# Patient Record
Sex: MALE | Race: WHITE | Employment: UNEMPLOYED | URBAN - METROPOLITAN AREA
[De-identification: names, ages, dates, MRNs, and addresses within clinical notes are randomized per-mention and may not be internally consistent; named-entity substitution may affect disease eponyms.]

---

## 2018-03-19 ENCOUNTER — APPOINTMENT (OUTPATIENT)
Dept: GENERAL RADIOLOGY | Age: 58
End: 2018-03-19
Attending: PHYSICIAN ASSISTANT
Payer: COMMERCIAL

## 2018-03-19 ENCOUNTER — HOSPITAL ENCOUNTER (EMERGENCY)
Age: 58
Discharge: HOME | End: 2018-03-19
Attending: EMERGENCY MEDICINE
Payer: COMMERCIAL

## 2018-03-19 VITALS
TEMPERATURE: 98.6 F | WEIGHT: 230 LBS | DIASTOLIC BLOOD PRESSURE: 97 MMHG | HEIGHT: 71 IN | OXYGEN SATURATION: 97 % | BODY MASS INDEX: 32.2 KG/M2 | RESPIRATION RATE: 16 BRPM | SYSTOLIC BLOOD PRESSURE: 129 MMHG | HEART RATE: 100 BPM

## 2018-03-19 DIAGNOSIS — M19.041 ARTHRITIS OF RIGHT HAND: Primary | ICD-10-CM

## 2018-03-19 DIAGNOSIS — L03.113 CELLULITIS OF RIGHT UPPER EXTREMITY: ICD-10-CM

## 2018-03-19 LAB
ANION GAP SERPL CALC-SCNC: 9 MMOL/L (ref 3–18)
BASOPHILS # BLD: 0 K/UL (ref 0–0.06)
BASOPHILS NFR BLD: 0 % (ref 0–2)
BUN SERPL-MCNC: 16 MG/DL (ref 7–18)
BUN/CREAT SERPL: 22 (ref 12–20)
CALCIUM SERPL-MCNC: 9.5 MG/DL (ref 8.5–10.1)
CHLORIDE SERPL-SCNC: 102 MMOL/L (ref 100–108)
CO2 SERPL-SCNC: 27 MMOL/L (ref 21–32)
CREAT SERPL-MCNC: 0.72 MG/DL (ref 0.6–1.3)
D DIMER PPP FEU-MCNC: <0.27 UG/ML(FEU)
DIFFERENTIAL METHOD BLD: ABNORMAL
EOSINOPHIL # BLD: 0.1 K/UL (ref 0–0.4)
EOSINOPHIL NFR BLD: 1 % (ref 0–5)
ERYTHROCYTE [DISTWIDTH] IN BLOOD BY AUTOMATED COUNT: 12.6 % (ref 11.6–14.5)
ERYTHROCYTE [SEDIMENTATION RATE] IN BLOOD: 7 MM/HR (ref 0–20)
GLUCOSE SERPL-MCNC: 110 MG/DL (ref 74–99)
HCT VFR BLD AUTO: 46.1 % (ref 36–48)
HGB BLD-MCNC: 15.2 G/DL (ref 13–16)
LYMPHOCYTES # BLD: 1.8 K/UL (ref 0.9–3.6)
LYMPHOCYTES NFR BLD: 19 % (ref 21–52)
MCH RBC QN AUTO: 29.9 PG (ref 24–34)
MCHC RBC AUTO-ENTMCNC: 33 G/DL (ref 31–37)
MCV RBC AUTO: 90.7 FL (ref 74–97)
MONOCYTES # BLD: 0.9 K/UL (ref 0.05–1.2)
MONOCYTES NFR BLD: 9 % (ref 3–10)
NEUTS SEG # BLD: 6.9 K/UL (ref 1.8–8)
NEUTS SEG NFR BLD: 71 % (ref 40–73)
PLATELET # BLD AUTO: 311 K/UL (ref 135–420)
PMV BLD AUTO: 9.5 FL (ref 9.2–11.8)
POTASSIUM SERPL-SCNC: 4.6 MMOL/L (ref 3.5–5.5)
RBC # BLD AUTO: 5.08 M/UL (ref 4.7–5.5)
SODIUM SERPL-SCNC: 138 MMOL/L (ref 136–145)
URATE SERPL-MCNC: 6.8 MG/DL (ref 2.6–7.2)
WBC # BLD AUTO: 9.8 K/UL (ref 4.6–13.2)

## 2018-03-19 PROCEDURE — 99282 EMERGENCY DEPT VISIT SF MDM: CPT

## 2018-03-19 PROCEDURE — 80048 BASIC METABOLIC PNL TOTAL CA: CPT

## 2018-03-19 PROCEDURE — 84550 ASSAY OF BLOOD/URIC ACID: CPT

## 2018-03-19 PROCEDURE — 96375 TX/PRO/DX INJ NEW DRUG ADDON: CPT

## 2018-03-19 PROCEDURE — 74011250636 HC RX REV CODE- 250/636: Performed by: PHYSICIAN ASSISTANT

## 2018-03-19 PROCEDURE — 73120 X-RAY EXAM OF HAND: CPT

## 2018-03-19 PROCEDURE — 74011000250 HC RX REV CODE- 250: Performed by: PHYSICIAN ASSISTANT

## 2018-03-19 PROCEDURE — 85379 FIBRIN DEGRADATION QUANT: CPT

## 2018-03-19 PROCEDURE — 85025 COMPLETE CBC W/AUTO DIFF WBC: CPT

## 2018-03-19 PROCEDURE — 85652 RBC SED RATE AUTOMATED: CPT

## 2018-03-19 PROCEDURE — 73110 X-RAY EXAM OF WRIST: CPT

## 2018-03-19 PROCEDURE — 96374 THER/PROPH/DIAG INJ IV PUSH: CPT

## 2018-03-19 RX ORDER — NAPROXEN 375 MG/1
375 TABLET ORAL 2 TIMES DAILY WITH MEALS
Qty: 20 TAB | Refills: 0 | Status: SHIPPED | OUTPATIENT
Start: 2018-03-19 | End: 2021-05-13

## 2018-03-19 RX ORDER — KETOROLAC TROMETHAMINE 30 MG/ML
30 INJECTION, SOLUTION INTRAMUSCULAR; INTRAVENOUS
Status: COMPLETED | OUTPATIENT
Start: 2018-03-19 | End: 2018-03-19

## 2018-03-19 RX ORDER — TRAMADOL HYDROCHLORIDE 50 MG/1
50 TABLET ORAL
Qty: 12 TAB | Refills: 0 | Status: SHIPPED | OUTPATIENT
Start: 2018-03-19 | End: 2021-05-13

## 2018-03-19 RX ORDER — SULFAMETHOXAZOLE AND TRIMETHOPRIM 800; 160 MG/1; MG/1
1 TABLET ORAL 2 TIMES DAILY
Qty: 14 TAB | Refills: 0 | Status: SHIPPED | OUTPATIENT
Start: 2018-03-19 | End: 2018-03-26

## 2018-03-19 RX ORDER — CEPHALEXIN 500 MG/1
500 CAPSULE ORAL 4 TIMES DAILY
Qty: 28 CAP | Refills: 0 | Status: SHIPPED | OUTPATIENT
Start: 2018-03-19 | End: 2018-03-26

## 2018-03-19 RX ADMIN — Medication 1 G: at 18:10

## 2018-03-19 RX ADMIN — KETOROLAC TROMETHAMINE 30 MG: 30 INJECTION, SOLUTION INTRAMUSCULAR; INTRAVENOUS at 18:10

## 2018-03-19 NOTE — ED TRIAGE NOTES
States he noted right hand swelling on Friday morning, denies injury. Taking medication without relief.

## 2018-03-19 NOTE — LETTER
[address] [place] [address] Dr [facility] EMERG[facility] DEPT 
[address] [facility] [address] [phone] Work/School Note Date: 3/19/2018 To Whom It May concern: 
 
Rafael Rader was seen and treated today in the emergency room by the following provider(s): 
Attending Provider: Caitlin June MD 
Physician Assistant: Sharonda Galo PA-C. Rafael Rader may return to work on 03/22/2018. Sincerely, Sharonda Galo PA-C

## 2018-03-19 NOTE — ED NOTES
I have reviewed discharge instructions with the patient. The patient verbalized understanding. Current Discharge Medication List      START taking these medications    Details   naproxen (NAPROSYN) 375 mg tablet Take 1 Tab by mouth two (2) times daily (with meals). Qty: 20 Tab, Refills: 0      traMADol (ULTRAM) 50 mg tablet Take 1 Tab by mouth every six (6) hours as needed for Pain. Max Daily Amount: 200 mg. Qty: 12 Tab, Refills: 0    Associated Diagnoses: Arthritis of right hand      cephALEXin (KEFLEX) 500 mg capsule Take 1 Cap by mouth four (4) times daily for 7 days. Qty: 28 Cap, Refills: 0      trimethoprim-sulfamethoxazole (BACTRIM DS) 160-800 mg per tablet Take 1 Tab by mouth two (2) times a day for 7 days.   Qty: 14 Tab, Refills: 0         Patient armband removed and shredded

## 2018-03-19 NOTE — ED PROVIDER NOTES
Patient is a 65 y.o. male presenting with wrist pain. The history is provided by the patient. Wrist Pain    This is a new problem. Episode onset: 3 days ago. The problem occurs constantly. The problem has been gradually worsening. The pain is present in the right wrist and right hand (Radiating into the hand). The quality of the pain is described as aching. The pain is at a severity of 3/10. Pertinent negatives include no numbness, full range of motion, no stiffness, no tingling, no itching, no back pain and no neck pain. The symptoms are aggravated by palpation and movement. He has tried OTC pain medications and rest for the symptoms. The treatment provided mild relief. There has been no history of extremity trauma (But began after work). Family history is significant for rheumatoid arthritis. No past medical history on file. Past Surgical History:   Procedure Laterality Date    HX ORTHOPAEDIC           No family history on file. Social History     Social History    Marital status:      Spouse name: N/A    Number of children: N/A    Years of education: N/A     Occupational History    Not on file. Social History Main Topics    Smoking status: Never Smoker    Smokeless tobacco: Never Used    Alcohol use Not on file    Drug use: Not on file    Sexual activity: Not on file     Other Topics Concern    Not on file     Social History Narrative    No narrative on file         ALLERGIES: Review of patient's allergies indicates not on file. Review of Systems   Constitutional: Negative for chills and fever. HENT: Negative for ear pain, rhinorrhea and sore throat. Eyes: Negative for pain and redness. Respiratory: Negative for cough and shortness of breath. Cardiovascular: Negative for chest pain. Gastrointestinal: Negative for abdominal pain, constipation, diarrhea, nausea and vomiting. Genitourinary: Negative for dysuria.    Musculoskeletal: Positive for arthralgias and joint swelling. Negative for back pain, gait problem, neck pain, neck stiffness and stiffness. Skin: Negative. Negative for itching. Neurological: Negative for dizziness, tingling, tremors, seizures, syncope, facial asymmetry, speech difficulty, weakness, light-headedness, numbness and headaches. Psychiatric/Behavioral: Negative. All other systems reviewed and are negative. Vitals:    03/19/18 1546   BP: (!) 129/97   Pulse: 100   Resp: 16   Temp: 98.6 °F (37 °C)   SpO2: 97%   Weight: 104.3 kg (230 lb)   Height: 5' 11\" (1.803 m)            Physical Exam   Constitutional: He is oriented to person, place, and time. He appears well-developed and well-nourished. HENT:   Head: Normocephalic and atraumatic. Right Ear: Tympanic membrane, external ear and ear canal normal.   Left Ear: Tympanic membrane, external ear and ear canal normal.   Nose: Nose normal.   Mouth/Throat: Oropharynx is clear and moist and mucous membranes are normal.   Eyes: Conjunctivae and EOM are normal. Pupils are equal, round, and reactive to light. Neck: Normal range of motion. Cardiovascular: Normal rate, regular rhythm and normal heart sounds. Exam reveals no gallop and no friction rub. No murmur heard. Pulmonary/Chest: Effort normal and breath sounds normal. No respiratory distress. He has no wheezes. He has no rales. Abdominal: Soft. Bowel sounds are normal. There is no tenderness. Musculoskeletal:        Right shoulder: Normal.        Right elbow: Normal.       Right wrist: He exhibits decreased range of motion, tenderness and swelling. He exhibits no bony tenderness, no effusion, no crepitus, no deformity and no laceration. Right upper arm: Normal.        Right forearm: Normal.        Arms:       Right hand: He exhibits decreased range of motion, tenderness and swelling. He exhibits no bony tenderness, normal two-point discrimination, normal capillary refill, no deformity and no laceration.  Normal sensation noted. Decreased sensation is not present in the ulnar distribution, is not present in the medial distribution and is not present in the radial distribution. Decreased strength noted. He exhibits wrist extension trouble. He exhibits no finger abduction and no thumb/finger opposition. Right hand and wrist with swelling, erythema, TTP, stiffness. Middle phalanx with arthritic bony deformity. Neurological: He is alert and oriented to person, place, and time. Skin: Skin is warm and dry. Psychiatric: He has a normal mood and affect. His behavior is normal. Judgment and thought content normal.   Nursing note and vitals reviewed. MDM  Number of Diagnoses or Management Options  Arthritis of right hand: new and requires workup  Cellulitis of right upper extremity: new and requires workup  Diagnosis management comments: DIFFERENTIAL DIAGNOSES:  Fracture, dislocation, sprain, strain, tendonitis, DVT, Infection/ septic joint, DJD, RA, hemarthrosis, gout, pseudogout, inflammatory effusion. NO acute fx or dislocation, severe arthritis noted to right hand, radiology read pending. No elev WBC, sed rate, uric acid or d-dimer. D/w ED attending Dr. Isidro Urbina, who also examined pt, agrees c/w RA picture, but will cover for cellulitis with antibx. Have pt f/u with PCP and ortho. IMPRESSION AND MEDICAL DECISION MAKING:  Based upon the patient's presentation with noted HPI and PE, along with the work up done in the emergency department, I believe that the patient is having noted cellulitis. Will treat with antibiotics which include coverage for possible MRSA. SPECIFIC PATIENT INSTRUCTIONS FROM THE PHYSICIAN WHO TREATED YOU IN THE ER TODAY:  1. Return if any concerns or worsening of condition(s)  2. Keflex and Bactrim DS as prescribed until finished. 3. Take the naproxen as prescribed. Take the tramadol for pain not controlled by naprosyn.   4. Follow up with your primary doctor in 2 days or reevaluation in the ER in 48 hours. Follow up with the provided orthopedic referral.     Pt results have been reviewed with the patient and any family present. They have been counseled regarding diagnosis, treatment, and plan. They verbally convey understanding and agreement of the signs, symptoms, diagnosis, treatment and prognosis and additionally agrees to follow up as discussed. They also agree with the care-plan and convey that all of their questions have been answered. I have also provided discharge instructions for them that include: educational information regarding their diagnosis and treatment, and list of reasons why they would want to return to the ED prior to their follow-up appointment, should their condition change. Bret Lawson PA-C  5:55 PM        Amount and/or Complexity of Data Reviewed  Clinical lab tests: ordered and reviewed  Tests in the radiology section of CPT®: ordered and reviewed  Review and summarize past medical records: yes  Discuss the patient with other providers: yes  Independent visualization of images, tracings, or specimens: yes    Risk of Complications, Morbidity, and/or Mortality  Presenting problems: moderate  Diagnostic procedures: moderate  Management options: moderate    Patient Progress  Patient progress: stable        ED Course       Procedures      Labs Reviewed   CBC WITH AUTOMATED DIFF - Abnormal; Notable for the following:        Result Value    LYMPHOCYTES 19 (*)     All other components within normal limits   METABOLIC PANEL, BASIC - Abnormal; Notable for the following:     Glucose 110 (*)     BUN/Creatinine ratio 22 (*)     All other components within normal limits   D DIMER   URIC ACID   SED RATE (ESR)     Diagnosis:   1. Arthritis of right hand    2. Cellulitis of right upper extremity          Disposition: Discharge to home.      Follow-up Information     Follow up With Details Comments [facility]  As needed, If symptoms worsen [id number] [facility] [id number]  [address] [facility],[address] Ailin Canchola MD Go in 2 days For wound re-check [address]       [address] [facility] in 2 days  [address] [facility]  [phone]          Patient's Medications   Start Taking    CEPHALEXIN (KEFLEX) 500 MG CAPSULE    Take 1 Cap by mouth four (4) times daily for 7 days. NAPROXEN (NAPROSYN) 375 MG TABLET    Take 1 Tab by mouth two (2) times daily (with meals). TRAMADOL (ULTRAM) 50 MG TABLET    Take 1 Tab by mouth every six (6) hours as needed for Pain. Max Daily Amount: 200 mg. TRIMETHOPRIM-SULFAMETHOXAZOLE (BACTRIM DS) 160-800 MG PER TABLET    Take 1 Tab by mouth two (2) times a day for 7 days.    Continue Taking    No medications on file   These Medications have changed    No medications on file   Stop Taking    No medications on file

## 2018-03-22 ENCOUNTER — OFFICE VISIT (OUTPATIENT)
Dept: ORTHOPEDIC SURGERY | Age: 58
End: 2018-03-22

## 2018-03-22 VITALS
WEIGHT: 271.4 LBS | TEMPERATURE: 97 F | OXYGEN SATURATION: 98 % | BODY MASS INDEX: 37.99 KG/M2 | SYSTOLIC BLOOD PRESSURE: 169 MMHG | DIASTOLIC BLOOD PRESSURE: 101 MMHG | HEIGHT: 71 IN | HEART RATE: 88 BPM

## 2018-03-22 DIAGNOSIS — L03.113 CELLULITIS OF RIGHT HAND: Primary | ICD-10-CM

## 2018-03-22 NOTE — LETTER
NOTIFICATION RETURN TO WORK / SCHOOL 
 
3/22/2018 9:50 AM 
 
Mr. Augustus Garvey [address] [place] [address] To Whom It May Concern: 
 
Augustus Garvey is currently under the care of [address] [place] [address] Donny Tinoco. Patient will remain out of work until I reassess his condition at the next office visit 3/26/18. If there are questions or concerns please have the patient contact our office. Sincerely, Rhiannon Griffiths MD

## 2018-03-22 NOTE — PROGRESS NOTES
Felipe Gavin  1960   Chief Complaint   Patient presents with    Hand Pain     right        HISTORY OF PRESENT ILLNESS  Felipe Gavin is a 65 y.o. male who presents today for evaluation of right hand pain. he rates his pain 8/10 today. Pain has been present since 3/16/18. He was seen in the ED 3/19/18. When he woke up in the morning, he had significant swelling, redness and warmth in the hand. Patient has a small area on the right middle finger that was very itchy. He scratched it a lot and then a blister formed. Now he has pain that radiates up the elbow and eventually into the shoulder. Describes nerve pain in the hand and up the arm. Has taken bactrim and keflex for the problem. Reports having made improvements on the antibiotics. Has tried following treatments: Injections:NO; Brace:NO; Therapy:NO; Cane/Crutch:NO       No Known Allergies     History reviewed. No pertinent past medical history. Social History     Social History    Marital status: UNKNOWN     Spouse name: N/A    Number of children: N/A    Years of education: N/A     Occupational History    Not on file. Social History Main Topics    Smoking status: Never Smoker    Smokeless tobacco: Never Used    Alcohol use 1.2 oz/week     2 Cans of beer per week    Drug use: No    Sexual activity: Not on file     Other Topics Concern    Not on file     Social History Narrative      Past Surgical History:   Procedure Laterality Date    HX ORTHOPAEDIC        Family History   Problem Relation Age of Onset    Diabetes Maternal Grandmother       Current Outpatient Prescriptions   Medication Sig    naproxen (NAPROSYN) 375 mg tablet Take 1 Tab by mouth two (2) times daily (with meals).  traMADol (ULTRAM) 50 mg tablet Take 1 Tab by mouth every six (6) hours as needed for Pain. Max Daily Amount: 200 mg.  cephALEXin (KEFLEX) 500 mg capsule Take 1 Cap by mouth four (4) times daily for 7 days.     trimethoprim-sulfamethoxazole (BACTRIM DS) 160-800 mg per tablet Take 1 Tab by mouth two (2) times a day for 7 days. No current facility-administered medications for this visit. REVIEW OF SYSTEM   Patient denies: Weight loss, Fever/Chills, HA, Visual changes, Fatigue, Chest pain, SOB, Abdominal pain, N/V/D/C, Blood in stool or urine, Edema. Pertinent positive as above in HPI. All others were negative    PHYSICAL EXAM:   Visit Vitals    BP (!) 169/101    Pulse 88    Temp 97 °F (36.1 °C)    Ht 5' 11\" (1.803 m)    Wt 271 lb 6.4 oz (123.1 kg)    SpO2 98%    BMI 37.85 kg/m2     The patient is a well-developed, well-nourished male   in no acute distress. The patient is alert and oriented times three. The patient is alert and oriented times three. Mood and affect are normal.  LYMPHATIC: lymph nodes are not enlarged and are within normal limits  SKIN: normal in color and non tender to palpation. There are no bruises or abrasions noted. NEUROLOGICAL: Motor sensory exam is within normal limits. Reflexes are equal bilaterally. There is normal sensation to pinprick and light touch  MUSCULOSKELETAL:  There is cellulitis extending from the long finger dorsum of the hand with a streak just proximal to the wrist that is very faintly red. There is no bright redness. There are no areas of induration, although there is diffuse swelling. There is swelling in the PIP joint from a deformity that he had before with restricted range of motion, but the passive range of motion is 30° with no significant amount of pain. There is no evidence of septic joint at this time or an abscess. He does say that the area had improved dramatically from last night. Diffuse swelling and redness      IMAGING: XR of the right hand and right wrist dated 3/19/18 was reviewed and read:   IMPRESSION:  Right wrist:  1. Degenerative changes in the right wrist as above, particularly first [facility]  joint.  Increased scapholunate interval, possibly ligamental injury or laxity. Right hand:  1. Advanced degenerative changes as above. There are characteristics of both inflammatory arthropathy and osteoarthrosis. See details above. IMPRESSION:      ICD-10-CM ICD-9-CM    1. Cellulitis of right hand L03.113 682.4         PLAN:  1. Patient's right hand and wrist pain is coming from an infection. Right now, it does not look like an abscess. If it does not improve, instructed patient to go to the ED. The patient is making progress each day which is a good sign. Warm compress, elevate the arm. Continue antibiotics. Provided with a work note. Risk factors include: n/a  2. No cortisone injection indicated today   3. No Physical/Occupational Therapy indicated today  4. No diagnostic test indicated today  5. No durable medical equipment indicated today  6. No referral indicated today   7. No medications indicated today  8.  No Narcotic indicated today     RTC 3/26/18 for wound recheck  Follow-up Disposition: Not on File    Scribed by Crystal [facility]) as dictated by MAURICE Maloney [address] and Spine Specialist

## 2018-03-26 ENCOUNTER — OFFICE VISIT (OUTPATIENT)
Dept: ORTHOPEDIC SURGERY | Age: 58
End: 2018-03-26

## 2018-03-26 VITALS
HEART RATE: 96 BPM | OXYGEN SATURATION: 98 % | DIASTOLIC BLOOD PRESSURE: 89 MMHG | TEMPERATURE: 97.5 F | BODY MASS INDEX: 36.98 KG/M2 | SYSTOLIC BLOOD PRESSURE: 151 MMHG | RESPIRATION RATE: 16 BRPM | WEIGHT: 273 LBS | HEIGHT: 72 IN

## 2018-03-26 DIAGNOSIS — L03.113 CELLULITIS OF RIGHT HAND: Primary | ICD-10-CM

## 2018-03-26 RX ORDER — MELOXICAM 15 MG/1
15 TABLET ORAL
Qty: 30 TAB | Refills: 1 | Status: SHIPPED | OUTPATIENT
Start: 2018-03-26 | End: 2021-05-13

## 2018-03-26 RX ORDER — IBUPROFEN 200 MG
TABLET ORAL
COMMUNITY
End: 2021-05-13

## 2018-03-26 NOTE — PROGRESS NOTES
Nori Riding  1960   Chief Complaint   Patient presents with    Shoulder Pain     R SHOULDER 2/10        HISTORY OF PRESENT ILLNESS  Nori Gaines is a 65 y.o. male who presents today for reevaluation of right hand pain. Patient rates pain as 2/10 today. The pain and redness have greatly decreased in the right hand. He has continued to take the antibiotics. The swelling and tenderness have greatly decreased. He is able to move the hand. Pain has been present since 3/16/18. He was seen in the ED 3/19/18. Patient denies any fever, chills, chest pain, shortness of breath or calf pain. There are no new illness or injuries to report since last seen in the office. There are no changes to medications, allergies, family or social history. PHYSICAL EXAM:   Visit Vitals    /89    Pulse 96    Temp 97.5 °F (36.4 °C) (Oral)    Resp 16    Ht 5' 11.5\" (1.816 m)    Wt 273 lb (123.8 kg)    SpO2 98%    BMI 37.55 kg/m2     The patient is a well-developed, well-nourished male   in no acute distress. The patient is alert and oriented times three. The patient is alert and oriented times three. Mood and affect are normal.  LYMPHATIC: lymph nodes are not enlarged and are within normal limits  SKIN: normal in color and non tender to palpation. There are no bruises or abrasions noted. NEUROLOGICAL: Motor sensory exam is within normal limits. Reflexes are equal bilaterally.  There is normal sensation to pinprick and light touch  MUSCULOSKELETAL:  Examination Right shoulder   Skin Intact   AC joint tenderness -   Biceps tenderness -   Forward flexion/Elevation    Active abduction    Glenohumeral abduction 90   External rotation ROM 90   Internal rotation ROM 70   Apprehension -   Kandices Relocation -   Jerk -   Load and Shift -   Obriens -   Speeds -   Impingement sign -   Supraspinatus/Empty Can -, 5/5   External Rotation Strength -, 5/5   Lift Off/Belly Press -, 5/5   Neurovascular Intact IMAGING: XR of the right hand and right wrist dated 3/19/18 was reviewed and read:   IMPRESSION:  Right wrist:  1. Degenerative changes in the right wrist as above, particularly first [facility]  joint. Increased scapholunate interval, possibly ligamental injury or laxity. Right hand:  1. Advanced degenerative changes as above. There are characteristics of both inflammatory arthropathy and osteoarthrosis. See details above.       IMPRESSION:      ICD-10-CM ICD-9-CM    1. Cellulitis of right hand L03.113 682.4 meloxicam (MOBIC) 15 mg tablet        PLAN:   1. I am pleased that the patient's right hand pain and swelling have improved. Return to activities as tolerated. Reminded him to finish the full course of antibiotics. Risk factors include: n/a  2. No cortisone injection indicated today   3. No Physical/Occupational Therapy indicated today  4. No diagnostic test indicated today   5. No durable medical equipment indicated today  6. No referral indicated today   7. Yes medications indicated today MOBIC  8. No Narcotic indicated today     RTC prn  Follow-up Disposition: Not on File    Scribed by Ynes Alvarado [address]) as dictated by Lj Moore MD    I, Dr. Lj Moore, confirm that all documentation is accurate.     Lj Moore M.D.   [address] and Spine Specialist

## 2021-03-17 NOTE — PROGRESS NOTES
Brad Pozo  1960   Chief Complaint   Patient presents with    Knee Pain     bilateral knee pain        HISTORY OF PRESENT ILLNESS  Brad Pozo is a 62 y.o. male who presents today for evaluation of b/l knee pain. He rates his pain 9/10 today. Pain has been present for awhile. Notes swelling. Pt states he has arthritis in multiple joints. Has tried taking \"pain pills\". Patient describes the pain as aching and sharp that is Constant in nature. Symptoms are worse with walking and movement, Activity and is better with  NSAID. Associated symptoms include Swelling. Since problem started, it: is unchanged. Pain does not wake patient up at night. Has taken NSAID for the problem. Has tried following treatments: Injections:NO; Brace:NO; Therapy:NO; Cane/Crutch:NO       No Known Allergies     History reviewed. No pertinent past medical history. Social History     Socioeconomic History    Marital status:      Spouse name: Not on file    Number of children: Not on file    Years of education: Not on file    Highest education level: Not on file   Occupational History    Not on file   Social Needs    Financial resource strain: Not on file    Food insecurity     Worry: Not on file     Inability: Not on file    Transportation needs     Medical: Not on file     Non-medical: Not on file   Tobacco Use    Smoking status: Former Smoker     Types: Cigarettes     Quit date: 3/26/1998     Years since quittin.9    Smokeless tobacco: Never Used    Tobacco comment: AROUND WIFE WHO SMOKES/2ND HAND SMOKE   Substance and Sexual Activity    Alcohol use:  Yes     Alcohol/week: 2.0 standard drinks     Types: 2 Cans of beer per week    Drug use: No    Sexual activity: Not on file   Lifestyle    Physical activity     Days per week: Not on file     Minutes per session: Not on file    Stress: Not on file   Relationships    Social connections     Talks on phone: Not on file     Gets together: Not on file Attends Hinduism service: Not on file     Active member of club or organization: Not on file     Attends meetings of clubs or organizations: Not on file     Relationship status: Not on file    Intimate partner violence     Fear of current or ex partner: Not on file     Emotionally abused: Not on file     Physically abused: Not on file     Forced sexual activity: Not on file   Other Topics Concern    Not on file   Social History Narrative    Not on file      Past Surgical History:   Procedure Laterality Date    HX ORTHOPAEDIC        Family History   Problem Relation Age of Onset    Diabetes Mother     No Known Problems Father     Diabetes Maternal Grandmother       Current Outpatient Medications   Medication Sig    ibuprofen (MOTRIN IB) 200 mg tablet Take  by mouth.  naproxen (NAPROSYN) 375 mg tablet Take 1 Tab by mouth two (2) times daily (with meals).  meloxicam (MOBIC) 15 mg tablet Take 1 Tab by mouth daily (with breakfast).  traMADol (ULTRAM) 50 mg tablet Take 1 Tab by mouth every six (6) hours as needed for Pain. Max Daily Amount: 200 mg. Current Facility-Administered Medications   Medication Dose Route Frequency    triamcinolone acetonide (KENALOG-40) 40 mg/mL injection 80 mg  80 mg Intra artICUlar ONCE       REVIEW OF SYSTEM   Patient denies: Weight loss, Fever/Chills, HA, Visual changes, Fatigue, Chest pain, SOB, Abdominal pain, N/V/D/C, Blood in stool or urine, Edema. Pertinent positive as above in HPI. All others were negative    PHYSICAL EXAM:   Visit Vitals  BP (!) 164/103 (BP 1 Location: Left upper arm, BP Patient Position: Sitting, BP Cuff Size: Large adult)   Pulse 83   Temp (!) 79.1 °F (26.2 °C) (Temporal)   Resp 18   Ht 5' 11\" (1.803 m)   Wt 265 lb (120.2 kg)   SpO2 98%   BMI 36.96 kg/m²     The patient is a well-developed, well-nourished male   in no acute distress. The patient is alert and oriented times three. The patient is alert and oriented times three.  Mood and affect are normal.  LYMPHATIC: lymph nodes are not enlarged and are within normal limits  SKIN: normal in color and non tender to palpation. There are no bruises or abrasions noted. NEUROLOGICAL: Motor sensory exam is within normal limits. Reflexes are equal bilaterally. There is normal sensation to pinprick and light touch  MUSCULOSKELETAL:  Examination Left knee Right knee   Skin Intact Intact   Range of motion     Effusion + +   Medial joint line tenderness + +   Lateral joint line tenderness + +   Tenderness Pes Bursa - -   Tenderness insertion MCL - -   Tenderness insertion LCL - -   Jonatans - -   Patella crepitus + +   Patella grind + +   Lachman - -   Pivot shift - -   Anterior drawer - -   Posterior drawer - -   Varus stress - -   Valgus stress - -   Neurovascular Intact Intact   Calf Swelling and Tenderness to Palpation - -   Chyna's Test - -   Hamstring Cord Tightness - -       PROCEDURE:  Bilateral Knee Injection with Ultrasound Guidance    Indication:Bilateral Knee pain/swelling    After sterile prep, 4 cc of Xylocaine and 1 cc of Kenalog were injected into the bilateral  Knee. Intra-articular Ultrasound images captured using [address] [place] [address] Loop Ultrasound machine using a frequency of 10 MHz with a linear transducer and scanned into patient's chart.            [facility] - [facility]  OFFICE PROCEDURE PROGRESS NOTE        Chart reviewed for the following:  Viviana Tillman M.D, have reviewed the History, Physical and updated the Allergic reactions for Laila Wyatt performed immediately prior to start of procedure:  Viviana Tillman M.D, have performed the following reviews on Maxwell Montenegro prior to the start of the procedure:            * Patient was identified by name and date of birth   * Agreement on procedure being performed was verified  * Risks and Benefits explained to the patient  * Procedure site verified and marked as necessary  * Patient was positioned for comfort  * Needle placement confirmed by ultrasound  * Consent was signed and verified     Time: 11:21 AM     Date of procedure: 3/18/2021    Procedure performed by:  Elizabeth Fortune M.D    Provider assisted by: (see medication administration)    How tolerated by patient: tolerated the procedure well with no complications    Comments: none      IMAGING: XR of left knee with 2 views obtained in the office dated 3/18/2021 was reviewed and read by Dr. Po Rich: Marked degenerative arthritis in the medial compartment with varus angulation        XR of right knee with 2 views obtained in the office dated 3/18/2021 was reviewed and read by Dr. Po Rich: Marked degenerative arthritis in the medial compartment with varus angulation      IMPRESSION:      ICD-10-CM ICD-9-CM    1. Primary osteoarthritis of both knees  M17.0 715.16 REFERRAL TO RHEUMATOLOGY      triamcinolone acetonide (KENALOG-40) 40 mg/mL injection 80 mg      ARTHROCENTESIS ASPIR&/INJ MAJOR JT/BURSA W/US   2. Chronic pain of right knee  M25.561 719.46 AMB POC XRAY, KNEE; 1/2 VIEWS    G89.29 338.29    3. Chronic pain of left knee  M25.562 719.46 AMB POC XRAY, KNEE; 1/2 VIEWS    G89.29 338.29         PLAN:  1. Pt presents today with b/l knee pain due to primary OA and I would like to try injecting both knees with cortisone. Will also refer to Rheumatology given complaint of multiple joint pain. Risk factors include: BMI>35  2. No ultrasound exam indicated today  3. Yes cortisone injection indicated today B/L KNEE US  4. No Physical/Occupational Therapy indicated today  5. No diagnostic test indicated today:   6. No durable medical equipment indicated today  7. Yes referral indicated today RHEUMATOLOGY  8. No medications indicated today:   9.  No Narcotic indicated today      RTC 3 weeks if pain continues      Scribed by Jaycob Page) as dictated by Elizabeth Fortune MD    I, Dr. Virginie Nice Mj, confirm that all documentation is accurate.     Jeramie Amezquita M.D.   [facility]

## 2021-03-18 ENCOUNTER — OFFICE VISIT (OUTPATIENT)
Dept: ORTHOPEDIC SURGERY | Age: 61
End: 2021-03-18
Payer: COMMERCIAL

## 2021-03-18 VITALS
BODY MASS INDEX: 37.1 KG/M2 | HEART RATE: 83 BPM | OXYGEN SATURATION: 98 % | SYSTOLIC BLOOD PRESSURE: 164 MMHG | WEIGHT: 265 LBS | HEIGHT: 71 IN | TEMPERATURE: 79.1 F | DIASTOLIC BLOOD PRESSURE: 103 MMHG | RESPIRATION RATE: 18 BRPM

## 2021-03-18 DIAGNOSIS — M17.0 PRIMARY OSTEOARTHRITIS OF BOTH KNEES: Primary | ICD-10-CM

## 2021-03-18 DIAGNOSIS — M25.561 CHRONIC PAIN OF RIGHT KNEE: ICD-10-CM

## 2021-03-18 DIAGNOSIS — G89.29 CHRONIC PAIN OF RIGHT KNEE: ICD-10-CM

## 2021-03-18 DIAGNOSIS — M25.562 CHRONIC PAIN OF LEFT KNEE: ICD-10-CM

## 2021-03-18 DIAGNOSIS — G89.29 CHRONIC PAIN OF LEFT KNEE: ICD-10-CM

## 2021-03-18 PROCEDURE — 99203 OFFICE O/P NEW LOW 30 MIN: CPT | Performed by: ORTHOPAEDIC SURGERY

## 2021-03-18 PROCEDURE — 73560 X-RAY EXAM OF KNEE 1 OR 2: CPT | Performed by: ORTHOPAEDIC SURGERY

## 2021-03-18 PROCEDURE — 20611 DRAIN/INJ JOINT/BURSA W/US: CPT | Performed by: ORTHOPAEDIC SURGERY

## 2021-03-18 RX ORDER — TRIAMCINOLONE ACETONIDE 40 MG/ML
80 INJECTION, SUSPENSION INTRA-ARTICULAR; INTRAMUSCULAR ONCE
Status: COMPLETED | OUTPATIENT
Start: 2021-03-18 | End: 2021-03-18

## 2021-03-18 RX ADMIN — TRIAMCINOLONE ACETONIDE 80 MG: 40 INJECTION, SUSPENSION INTRA-ARTICULAR; INTRAMUSCULAR at 11:39

## 2021-04-07 ENCOUNTER — OFFICE VISIT (OUTPATIENT)
Dept: ORTHOPEDIC SURGERY | Age: 61
End: 2021-04-07
Payer: COMMERCIAL

## 2021-04-07 VITALS
HEART RATE: 94 BPM | BODY MASS INDEX: 37.8 KG/M2 | WEIGHT: 270 LBS | OXYGEN SATURATION: 99 % | HEIGHT: 71 IN | RESPIRATION RATE: 16 BRPM

## 2021-04-07 DIAGNOSIS — M17.0 PRIMARY OSTEOARTHRITIS OF BOTH KNEES: Primary | ICD-10-CM

## 2021-04-07 PROCEDURE — 99213 OFFICE O/P EST LOW 20 MIN: CPT | Performed by: ORTHOPAEDIC SURGERY

## 2021-04-07 NOTE — PROGRESS NOTES
Hayley Martinez  1960   Chief Complaint   Patient presents with    Knee Pain     Both knees        HISTORY OF PRESENT ILLNESS  Hayley Martinez is a 62 y.o. male who presents today for reevaluation of b/l knee. Patient rates pain as 6/10 today. Pain has been present for awhile. Notes swelling. Pt states he has arthritis in multiple joints. Has tried taking \"pain pills\". At last OV on 3/18/2021, patient had a b/l knee cortisone injection which did not provide lasting relief. Injections lasted one day. Does also note some tingling in the tips of the fingers. Also complains of a \"knot\" in the left pinky. Patient denies any fever, chills, chest pain, shortness of breath or calf pain. The remainder of the review of systems is negative. There are no new illness or injuries to report since last seen in the office. There are no changes to medications, allergies, family or social history. Pain Assessment  4/7/2021   Location of Pain Knee   Location Modifiers Right;Left   Severity of Pain 6   Quality of Pain Throbbing; Lawayne Ostrander; Aching;Burning   Quality of Pain Comment -   Duration of Pain Persistent   Frequency of Pain Constant   Aggravating Factors Walking;Standing;Exercise   Aggravating Factors Comment -   Limiting Behavior Yes   Relieving Factors Other (Comment)   Relieving Factors Comment Pain medication   Result of Injury -         PHYSICAL EXAM:   Visit Vitals  Pulse 94   Resp 16   Ht 5' 11\" (1.803 m)   Wt 270 lb (122.5 kg)   SpO2 99%   BMI 37.66 kg/m²     The patient is a well-developed, well-nourished male   in no acute distress. The patient is alert and oriented times three. The patient is alert and oriented times three. Mood and affect are normal.  LYMPHATIC: lymph nodes are not enlarged and are within normal limits  SKIN: normal in color and non tender to palpation. There are no bruises or abrasions noted. NEUROLOGICAL: Motor sensory exam is within normal limits. Reflexes are equal bilaterally.  There is normal sensation to pinprick and light touch  MUSCULOSKELETAL:  Examination Left knee Right knee   Skin Intact Intact   Range of motion     Effusion + +   Medial joint line tenderness + +   Lateral joint line tenderness + +   Tenderness Pes Bursa - -   Tenderness insertion MCL - -   Tenderness insertion LCL - -   Jonatans - -   Patella crepitus + +   Patella grind + +   Lachman - -   Pivot shift - -   Anterior drawer - -   Posterior drawer - -   Varus stress - -   Valgus stress - -   Neurovascular Intact Intact   Calf Swelling and Tenderness to Palpation - -   Chyna's Test - -   Hamstring Cord Tightness - -       IMAGING: XR of left knee with 2 views obtained in the office dated 3/18/2021 was reviewed and read by Dr. Rajat Barbosa: Marked degenerative arthritis in the medial compartment with varus angulation           XR of right knee with 2 views obtained in the office dated 3/18/2021 was reviewed and read by Dr. Rajat Barbosa: Marked degenerative arthritis in the medial compartment with varus angulation      IMPRESSION:      ICD-10-CM ICD-9-CM    1. Primary osteoarthritis of both knees  M17.0 715.16 PROCEDURE AUTHORIZATION TO         PLAN:   1. Pt presents today with b/l knee pain due to primary OA and the cortisone injections did not provide lasting relief. Will proceed with Euflexxa authorization. He also complains of a \"knot\" in the left pinky finger today, will refer to Dr. Sunday Henry to address further treatment for this. Was given work note with the following restrictions: No lifting above 15 pounds. Risk factors include: BMI>35  2. No ultrasound exam indicated today  3. No cortisone injection indicated today   4. No Physical/Occupational Therapy indicated today  5. No diagnostic test indicated today:   6. No durable medical equipment indicated today  7. Yes referral indicated today DR Gabe Cruz  8. No medications indicated today:   9.  No Narcotic indicated today      RTC following Euflexxa auth      Scribed by Venus Villavicencio ([address]) as dictated by Delvis Reyes MD    I, Dr. Delvis Reyes, confirm that all documentation is accurate.     Delvis Reyes M.D.   [facility]

## 2021-04-07 NOTE — LETTER
NOTIFICATION RETURN TO WORK / SCHOOL 
 
4/7/2021 3:11 PM 
 
Mr. Valentine Jurado [facility] [address] [facility] [address] To Whom It May Concern: 
 
Valentine Jurado is currently under the care of [address] [place] [address] Donny Tinoco. He has the following work restrictions: No lifting above 15 pounds. If there are questions or concerns please have the patient contact our office. Sincerely, Sabina Bravo MD

## 2021-04-09 ENCOUNTER — TELEPHONE (OUTPATIENT)
Dept: ORTHOPEDIC SURGERY | Age: 61
End: 2021-04-09

## 2021-04-09 NOTE — TELEPHONE ENCOUNTER
Patient called stating he needs his work note revised. He stated he is not able to work with the restrictions in place. 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 Patient can be reached at [phone].

## 2021-04-09 NOTE — LETTER
NOTIFICATION RETURN TO WORK / SCHOOL 
 
4/9/2021 2:40 PM 
 
Mr. Cayla Ruiz [facility] [address] [facility] [address] To Whom It May Concern: 
 
Cayla Ruiz is currently under the care of [address] [place] [address] Donny Tinoco. 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 If there are questions or concerns please have the patient contact our office. Sincerely, Yovany Donovan MD

## 2021-04-20 ENCOUNTER — HOSPITAL ENCOUNTER (EMERGENCY)
Age: 61
Discharge: HOME | End: 2021-04-21
Attending: EMERGENCY MEDICINE

## 2021-04-20 DIAGNOSIS — L03.90 CELLULITIS, UNSPECIFIED CELLULITIS SITE: Primary | ICD-10-CM

## 2021-04-20 DIAGNOSIS — M79.89 LEG SWELLING: ICD-10-CM

## 2021-04-20 DIAGNOSIS — I10 HYPERTENSION, UNSPECIFIED TYPE: ICD-10-CM

## 2021-04-20 LAB
ANION GAP SERPL CALC-SCNC: 6 MMOL/L (ref 3–18)
APPEARANCE UR: CLEAR
BASOPHILS # BLD: 0.1 K/UL (ref 0–0.1)
BASOPHILS NFR BLD: 1 % (ref 0–2)
BILIRUB UR QL: NEGATIVE
BUN SERPL-MCNC: 20 MG/DL (ref 7–18)
BUN/CREAT SERPL: 23 (ref 12–20)
CALCIUM SERPL-MCNC: 8.9 MG/DL (ref 8.5–10.1)
CHLORIDE SERPL-SCNC: 101 MMOL/L (ref 100–111)
CO2 SERPL-SCNC: 29 MMOL/L (ref 21–32)
COLOR UR: YELLOW
CREAT SERPL-MCNC: 0.86 MG/DL (ref 0.6–1.3)
DIFFERENTIAL METHOD BLD: ABNORMAL
EOSINOPHIL # BLD: 0.2 K/UL (ref 0–0.4)
EOSINOPHIL NFR BLD: 3 % (ref 0–5)
ERYTHROCYTE [DISTWIDTH] IN BLOOD BY AUTOMATED COUNT: 12.7 % (ref 11.6–14.5)
GLUCOSE SERPL-MCNC: 93 MG/DL (ref 74–99)
GLUCOSE UR STRIP.AUTO-MCNC: NEGATIVE MG/DL
HCT VFR BLD AUTO: 38.8 % (ref 36–48)
HGB BLD-MCNC: 13.3 G/DL (ref 13–16)
HGB UR QL STRIP: NEGATIVE
KETONES UR QL STRIP.AUTO: NEGATIVE MG/DL
LACTATE BLD-SCNC: 0.92 MMOL/L (ref 0.4–2)
LEUKOCYTE ESTERASE UR QL STRIP.AUTO: NEGATIVE
LYMPHOCYTES # BLD: 1.2 K/UL (ref 0.9–3.6)
LYMPHOCYTES NFR BLD: 16 % (ref 21–52)
MCH RBC QN AUTO: 31.7 PG (ref 24–34)
MCHC RBC AUTO-ENTMCNC: 34.3 G/DL (ref 31–37)
MCV RBC AUTO: 92.4 FL (ref 74–97)
MONOCYTES # BLD: 0.9 K/UL (ref 0.05–1.2)
MONOCYTES NFR BLD: 12 % (ref 3–10)
NEUTS SEG # BLD: 5 K/UL (ref 1.8–8)
NEUTS SEG NFR BLD: 67 % (ref 40–73)
NITRITE UR QL STRIP.AUTO: NEGATIVE
PH UR STRIP: 5.5 [PH] (ref 5–8)
PLATELET # BLD AUTO: 336 K/UL (ref 135–420)
PMV BLD AUTO: 9.4 FL (ref 9.2–11.8)
POTASSIUM SERPL-SCNC: 3.7 MMOL/L (ref 3.5–5.5)
PROT UR STRIP-MCNC: NEGATIVE MG/DL
RBC # BLD AUTO: 4.2 M/UL (ref 4.35–5.65)
SODIUM SERPL-SCNC: 136 MMOL/L (ref 136–145)
SP GR UR REFRACTOMETRY: 1.01 (ref 1–1.03)
UROBILINOGEN UR QL STRIP.AUTO: 0.2 EU/DL (ref 0.2–1)
WBC # BLD AUTO: 7.4 K/UL (ref 4.6–13.2)

## 2021-04-20 PROCEDURE — 74011250637 HC RX REV CODE- 250/637: Performed by: EMERGENCY MEDICINE

## 2021-04-20 PROCEDURE — 80048 BASIC METABOLIC PNL TOTAL CA: CPT

## 2021-04-20 PROCEDURE — 96375 TX/PRO/DX INJ NEW DRUG ADDON: CPT

## 2021-04-20 PROCEDURE — 83605 ASSAY OF LACTIC ACID: CPT

## 2021-04-20 PROCEDURE — 96365 THER/PROPH/DIAG IV INF INIT: CPT

## 2021-04-20 PROCEDURE — 86618 LYME DISEASE ANTIBODY: CPT

## 2021-04-20 PROCEDURE — 74011000250 HC RX REV CODE- 250: Performed by: EMERGENCY MEDICINE

## 2021-04-20 PROCEDURE — 99285 EMERGENCY DEPT VISIT HI MDM: CPT

## 2021-04-20 PROCEDURE — 86757 RICKETTSIA ANTIBODY: CPT

## 2021-04-20 PROCEDURE — 85025 COMPLETE CBC W/AUTO DIFF WBC: CPT

## 2021-04-20 PROCEDURE — 74011250636 HC RX REV CODE- 250/636: Performed by: EMERGENCY MEDICINE

## 2021-04-20 PROCEDURE — 96366 THER/PROPH/DIAG IV INF ADDON: CPT

## 2021-04-20 PROCEDURE — 87040 BLOOD CULTURE FOR BACTERIA: CPT

## 2021-04-20 PROCEDURE — 81003 URINALYSIS AUTO W/O SCOPE: CPT

## 2021-04-20 RX ORDER — POTASSIUM CHLORIDE 20 MEQ/1
40 TABLET, EXTENDED RELEASE ORAL
Status: COMPLETED | OUTPATIENT
Start: 2021-04-20 | End: 2021-04-21

## 2021-04-20 RX ORDER — SULFAMETHOXAZOLE AND TRIMETHOPRIM 800; 160 MG/1; MG/1
1 TABLET ORAL 2 TIMES DAILY
Qty: 14 TAB | Refills: 0 | Status: SHIPPED | OUTPATIENT
Start: 2021-04-20 | End: 2021-04-27

## 2021-04-20 RX ORDER — FUROSEMIDE 20 MG/1
20 TABLET ORAL DAILY
Qty: 3 TAB | Refills: 0 | Status: SHIPPED | OUTPATIENT
Start: 2021-04-20 | End: 2021-04-23

## 2021-04-20 RX ORDER — CLONIDINE HYDROCHLORIDE 0.1 MG/1
0.1 TABLET ORAL 2 TIMES DAILY
Qty: 20 TAB | Refills: 0 | Status: SHIPPED | OUTPATIENT
Start: 2021-04-20 | End: 2021-04-30

## 2021-04-20 RX ORDER — CLONIDINE HYDROCHLORIDE 0.1 MG/1
0.1 TABLET ORAL
Status: COMPLETED | OUTPATIENT
Start: 2021-04-20 | End: 2021-04-20

## 2021-04-20 RX ORDER — CEPHALEXIN 500 MG/1
500 CAPSULE ORAL 4 TIMES DAILY
Qty: 28 CAP | Refills: 0 | Status: SHIPPED | OUTPATIENT
Start: 2021-04-20 | End: 2021-04-27

## 2021-04-20 RX ORDER — FUROSEMIDE 10 MG/ML
20 INJECTION INTRAMUSCULAR; INTRAVENOUS
Status: COMPLETED | OUTPATIENT
Start: 2021-04-20 | End: 2021-04-21

## 2021-04-20 RX ADMIN — VANCOMYCIN HYDROCHLORIDE 1000 MG: 1 INJECTION, POWDER, LYOPHILIZED, FOR SOLUTION INTRAVENOUS at 22:09

## 2021-04-20 RX ADMIN — CLONIDINE HYDROCHLORIDE 0.1 MG: 0.1 TABLET ORAL at 23:14

## 2021-04-20 RX ADMIN — WATER 1 G: 1 INJECTION INTRAMUSCULAR; INTRAVENOUS; SUBCUTANEOUS at 23:16

## 2021-04-20 NOTE — Clinical Note
[address] [facility] EMERGENCY DEPT  [id number] [facility] [address] [phone]    Work/School Note    Date: 4/20/2021    To Whom It May concern:    Coy Gustafson was seen and treated today in the emergency room by the following provider(s):  Attending Provider: Lyndsey High MD.      Coy Gustafson is excused from work/school on 4/20/2021 through 4/23/2021. He is medically clear to return to work/school on 4/24/2021.         Sincerely,          Zamzam Tapia MD

## 2021-04-20 NOTE — ED TRIAGE NOTES
Pt arrives with c/o bilateral lower extremity swelling with weeping noted to both legs. Pt states hasn't seen a PCP in about 10 years.

## 2021-04-20 NOTE — ED PROVIDER NOTES
Pt c/o leg swelling/redness. Says swelling to both legs x 3 weeks . Redness x 3 days, to calf/ankles/feet. Worsening. Says can walk. No fever . Says no known pmh, but hasn't seen pcp for many years. No chest pain, no sob. On abd pain, but says found a tick on mid abd 2 days ago and removed it, unsure how long it was there for. Says red rash on abd since. Says h/o prior cellulitis to b/l hands, 4 yrs ago. History reviewed. No pertinent past medical history. Past Surgical History:   Procedure Laterality Date    HX ORTHOPAEDIC           Family History:   Problem Relation Age of Onset    Diabetes Mother     No Known Problems Father     Diabetes Maternal Grandmother        Social History     Socioeconomic History    Marital status:      Spouse name: Not on file    Number of children: Not on file    Years of education: Not on file    Highest education level: Not on file   Occupational History    Not on file   Social Needs    Financial resource strain: Not on file    Food insecurity     Worry: Not on file     Inability: Not on file    Transportation needs     Medical: Not on file     Non-medical: Not on file   Tobacco Use    Smoking status: Former Smoker     Types: Cigarettes     Quit date: 3/26/1998     Years since quittin.0    Smokeless tobacco: Never Used    Tobacco comment: AROUND WIFE WHO SMOKES/2ND HAND SMOKE   Substance and Sexual Activity    Alcohol use:  Yes     Alcohol/week: 18.0 standard drinks     Types: 18 Cans of beer per week    Drug use: No    Sexual activity: Not on file   Lifestyle    Physical activity     Days per week: Not on file     Minutes per session: Not on file    Stress: Not on file   Relationships    Social connections     Talks on phone: Not on file     Gets together: Not on file     Attends Baptism service: Not on file     Active member of club or organization: Not on file     Attends meetings of clubs or organizations: Not on file Relationship status: Not on file    Intimate partner violence     Fear of current or ex partner: Not on file     Emotionally abused: Not on file     Physically abused: Not on file     Forced sexual activity: Not on file   Other Topics Concern    Not on file   Social History Narrative    Not on file         ALLERGIES: Patient has no known allergies. Review of Systems   Constitutional: Negative for diaphoresis and fever. HENT: Negative for congestion. Respiratory: Negative for cough and shortness of breath. Cardiovascular: Negative for chest pain. Gastrointestinal: Negative for abdominal pain and nausea. Genitourinary: Negative for dysuria. Musculoskeletal: Negative for back pain. Skin: Positive for rash. Neurological: Negative for dizziness. All other systems reviewed and are negative. Vitals:    04/20/21 2000 04/20/21 2145 04/20/21 2200 04/20/21 2230   BP: (!) 179/94 (!) 173/79 (!) 177/98 (!) 174/93   Pulse: 93 (!) 111 87 87   Resp: 16 21 18 12   Temp:   98.3 °F (36.8 °C)    SpO2: 100%  100% 100%   Weight:       Height:                Physical Exam  Vitals signs and nursing note reviewed. Constitutional:       Appearance: He is well-developed. HENT:      Head: Normocephalic and atraumatic. Eyes:      Conjunctiva/sclera: Conjunctivae normal.   Neck:      Musculoskeletal: Normal range of motion. Cardiovascular:      Rate and Rhythm: Normal rate and regular rhythm. Pulmonary:      Effort: Pulmonary effort is normal.      Breath sounds: No wheezing. Abdominal:      Palpations: Abdomen is soft. Tenderness: There is no abdominal tenderness. Musculoskeletal:         General: Tenderness (b/l lower calf swelling/erythema/ttp.  nvi. 2+ dp pulse) present. Skin:     General: Skin is warm and dry. Capillary Refill: Capillary refill takes less than 2 seconds. Findings: Rash present. Comments: + erythema, blanching diffuse to abd. No fb.  No ttp   Neurological: Mental Status: He is alert and oriented to person, place, and time. Psychiatric:         Mood and Affect: Mood normal.          MDM       Procedures    Vitals:  Patient Vitals for the past 12 hrs:   Temp Pulse Resp BP SpO2   04/20/21 2230 -- 87 12 (!) 174/93 100 %   04/20/21 2200 98.3 °F (36.8 °C) 87 18 (!) 177/98 100 %   04/20/21 2145 -- (!) 111 21 (!) 173/79 --   04/20/21 2000 -- 93 16 (!) 179/94 100 %   04/20/21 1955 -- -- 14 -- 100 %   04/20/21 1954 -- -- -- -- 99 %   04/20/21 1952 -- -- -- (!) 167/93 --   04/20/21 1714 97.9 °F (36.6 °C) 98 20 (!) 189/118 99 %         Medications ordered:   Medications   vancomycin (VANCOCIN) 1,000 mg in 0.9% sodium chloride 250 mL (VIAL-MATE) (0 mg IntraVENous IV Completed 4/20/21 2310)     Followed by   vancomycin (VANCOCIN) 1,000 mg in 0.9% sodium chloride 250 mL (VIAL-MATE) (has no administration in time range)   furosemide (LASIX) injection 20 mg (has no administration in time range)   potassium chloride (K-DUR, KLOR-CON) SR tablet 40 mEq (has no administration in time range)   cloNIDine HCL (CATAPRES) tablet 0.1 mg (0.1 mg Oral Given 4/20/21 2314)   cefTRIAXone (ROCEPHIN) 1 g in sterile water (preservative free) 10 mL IV syringe (1 g IntraVENous Given 4/20/21 2316)         Lab findings:  Recent Results (from the past 12 hour(s))   POC LACTIC ACID    Collection Time: 04/20/21  6:29 PM   Result Value Ref Range    Lactic Acid (POC) 0.92 0.40 - 2.00 mmol/L   CBC WITH AUTOMATED DIFF    Collection Time: 04/20/21  6:34 PM   Result Value Ref Range    WBC 7.4 4.6 - 13.2 K/uL    RBC 4.20 (L) 4.35 - 5.65 M/uL    HGB 13.3 13.0 - 16.0 g/dL    HCT 38.8 36.0 - 48.0 %    MCV 92.4 74.0 - 97.0 FL    MCH 31.7 24.0 - 34.0 PG    MCHC 34.3 31.0 - 37.0 g/dL    RDW 12.7 11.6 - 14.5 %    PLATELET 625 279 - 735 K/uL    MPV 9.4 9.2 - 11.8 FL    NEUTROPHILS 67 40 - 73 %    LYMPHOCYTES 16 (L) 21 - 52 %    MONOCYTES 12 (H) 3 - 10 %    EOSINOPHILS 3 0 - 5 %    BASOPHILS 1 0 - 2 %    ABS.  NEUTROPHILS 5.0 1.8 - 8.0 K/UL    ABS. LYMPHOCYTES 1.2 0.9 - 3.6 K/UL    ABS. MONOCYTES 0.9 0.05 - 1.2 K/UL    ABS. EOSINOPHILS 0.2 0.0 - 0.4 K/UL    ABS. BASOPHILS 0.1 0.0 - 0.1 K/UL    DF AUTOMATED     METABOLIC PANEL, BASIC    Collection Time: 04/20/21  6:34 PM   Result Value Ref Range    Sodium 136 136 - 145 mmol/L    Potassium 3.7 3.5 - 5.5 mmol/L    Chloride 101 100 - 111 mmol/L    CO2 29 21 - 32 mmol/L    Anion gap 6 3.0 - 18 mmol/L    Glucose 93 74 - 99 mg/dL    BUN 20 (H) 7.0 - 18 MG/DL    Creatinine 0.86 0.6 - 1.3 MG/DL    BUN/Creatinine ratio 23 (H) 12 - 20      GFR est AA >60 >60 ml/min/1.73m2    GFR est non-AA >60 >60 ml/min/1.73m2    Calcium 8.9 8.5 - 10.1 MG/DL   URINALYSIS W/ RFLX MICROSCOPIC    Collection Time: 04/20/21  9:45 PM   Result Value Ref Range    Color YELLOW      Appearance CLEAR      Specific gravity 1.007 1.005 - 1.030      pH (UA) 5.5 5.0 - 8.0      Protein Negative NEG mg/dL    Glucose Negative NEG mg/dL    Ketone Negative NEG mg/dL    Bilirubin Negative NEG      Blood Negative NEG      Urobilinogen 0.2 0.2 - 1.0 EU/dL    Nitrites Negative NEG      Leukocyte Esterase Negative NEG             X-Ray, CT or other radiology findings or impressions:  No orders to display       Progress notes, Consult notes or additional Procedure notes:   Sig htn,187/104  improved w clonidine. Sig b/l lower calf/foot edema, w cellulitis. Tick bite, w rash, labs pending. Lasix given. Abx. Pt declines admit as recommended. Adamantly wants dc now. To dc per pt as he refuses admit pending serology and for further w/u for sig edema/infection. Strongly rec return for admit. Diagnosis:   1. Cellulitis, unspecified cellulitis site    2. Leg swelling    3.  Hypertension, unspecified type        Disposition: home    Follow-up Information     Follow up With Specialties Details Why Contact Info    [address] EMERGENCY DEPT Emergency Medicine   [address] [facility]  [phone]    [facility] DEPT Emergency Medicine Go to  As needed [address] [place] [address]  [address] [place] [address]  Schedule an appointment as soon as possible for a visit in 2 days or your family physician Nishant Saunderslazaro 3  [address] [place] [address]  [phone]           Patient's Medications   Start Taking    CEPHALEXIN (KEFLEX) 500 MG CAPSULE    Take 1 Cap by mouth four (4) times daily for 7 days. CLONIDINE HCL (CATAPRES) 0.1 MG TABLET    Take 1 Tab by mouth two (2) times a day for 10 days. FUROSEMIDE (LASIX) 20 MG TABLET    Take 1 Tab by mouth daily for 3 days. TRIMETHOPRIM-SULFAMETHOXAZOLE (BACTRIM DS) 160-800 MG PER TABLET    Take 1 Tab by mouth two (2) times a day for 7 days. Continue Taking    IBUPROFEN (MOTRIN IB) 200 MG TABLET    Take  by mouth. MELOXICAM (MOBIC) 15 MG TABLET    Take 1 Tab by mouth daily (with breakfast). NAPROXEN (NAPROSYN) 375 MG TABLET    Take 1 Tab by mouth two (2) times daily (with meals). TRAMADOL (ULTRAM) 50 MG TABLET    Take 1 Tab by mouth every six (6) hours as needed for Pain. Max Daily Amount: 200 mg.    These Medications have changed    No medications on file   Stop Taking    No medications on file

## 2021-04-20 NOTE — ED NOTES
Care assumed. Pt is awake/alert/calm/conversant. Denies dizziness/shortness of breath/chest pain. Scabbed area to inner umbilicus. Pt has swelling/redness/serous weeping from bilateral legs. Pt and spouse given plan of care including explanation of wait. No distress noted.

## 2021-04-21 VITALS
HEIGHT: 69 IN | RESPIRATION RATE: 13 BRPM | SYSTOLIC BLOOD PRESSURE: 175 MMHG | WEIGHT: 270 LBS | HEART RATE: 85 BPM | OXYGEN SATURATION: 99 % | DIASTOLIC BLOOD PRESSURE: 96 MMHG | TEMPERATURE: 98.3 F | BODY MASS INDEX: 39.99 KG/M2

## 2021-04-21 PROCEDURE — 74011250636 HC RX REV CODE- 250/636: Performed by: EMERGENCY MEDICINE

## 2021-04-21 PROCEDURE — 96375 TX/PRO/DX INJ NEW DRUG ADDON: CPT

## 2021-04-21 PROCEDURE — 96366 THER/PROPH/DIAG IV INF ADDON: CPT

## 2021-04-21 PROCEDURE — 74011250637 HC RX REV CODE- 250/637: Performed by: EMERGENCY MEDICINE

## 2021-04-21 RX ADMIN — VANCOMYCIN HYDROCHLORIDE 1000 MG: 1 INJECTION, POWDER, LYOPHILIZED, FOR SOLUTION INTRAVENOUS at 00:12

## 2021-04-21 RX ADMIN — POTASSIUM CHLORIDE 40 MEQ: 1500 TABLET, EXTENDED RELEASE ORAL at 01:24

## 2021-04-21 RX ADMIN — FUROSEMIDE 20 MG: 10 INJECTION, SOLUTION INTRAMUSCULAR; INTRAVENOUS at 01:23

## 2021-04-21 NOTE — ED NOTES
Pt awake/alert/calm/conversant, respirations regular/non labored/no changes to skin noted. Pt antibiotic is infusing per order, pt assisted higher in bed, and water proof pads changed under pt legs. Continuing to monitor.   Pt given plan of care

## 2021-04-21 NOTE — DISCHARGE INSTRUCTIONS
Return for pain, fever not resolving with motrin or tylenol, shortness of breath, vomiting, decreased fluid intake, weakness, numbness, dizziness, or any change or concerns - or for admission as recommended.

## 2021-04-21 NOTE — ED NOTES
Pt able to void per urinal; large amount of yellow urine noted. Pt remains awake/alert/calm/conversant without distress. No distress noted. Instructed to ensure follow up with referred provider, to take his medication as prescribed, and to return to ED for worsening pain/fever/other concerns. Instructed to use caution with regard to urinary urgency after lasix/increased fall risk.   Pt provided disposal urinal to take home

## 2021-04-21 NOTE — ED NOTES
Pt discharged/ambulatory to home. Instructed to changed dressings BID after cleansing with soap/water. Pt voiced his understanding.   Total urine output 3000 ml; instructed to increase intake fruit/vegetables to prevent hypokalemia

## 2021-04-22 LAB
B BURGDOR IGG+IGM SER IA-IMP: NORMAL
B BURGDOR IGG+IGM SER IA-IMP: NORMAL
B BURGDOR IGG+IGM SER-ACNC: <0.91 ISR (ref 0–0.9)
R RICKETTSI IGM SER-ACNC: 0.21 INDEX (ref 0–0.89)

## 2021-04-26 LAB
BACTERIA SPEC CULT: NORMAL
BACTERIA SPEC CULT: NORMAL
SERVICE CMNT-IMP: NORMAL
SERVICE CMNT-IMP: NORMAL

## 2021-05-05 ENCOUNTER — HOSPITAL ENCOUNTER (INPATIENT)
Age: 61
LOS: 8 days | Discharge: HOME HEALTH | DRG: 872 | End: 2021-05-13
Attending: EMERGENCY MEDICINE | Admitting: FAMILY MEDICINE

## 2021-05-05 ENCOUNTER — APPOINTMENT (OUTPATIENT)
Dept: GENERAL RADIOLOGY | Age: 61
DRG: 872 | End: 2021-05-05
Attending: EMERGENCY MEDICINE

## 2021-05-05 DIAGNOSIS — I48.92 ATRIAL FLUTTER, UNSPECIFIED TYPE (HCC): ICD-10-CM

## 2021-05-05 DIAGNOSIS — L03.113 CELLULITIS OF RIGHT HAND: ICD-10-CM

## 2021-05-05 DIAGNOSIS — A41.9 SEPSIS WITHOUT ACUTE ORGAN DYSFUNCTION, DUE TO UNSPECIFIED ORGANISM (HCC): ICD-10-CM

## 2021-05-05 DIAGNOSIS — L03.119 CELLULITIS OF LOWER EXTREMITY, UNSPECIFIED LATERALITY: Primary | ICD-10-CM

## 2021-05-05 DIAGNOSIS — E87.1 HYPONATREMIA: ICD-10-CM

## 2021-05-05 LAB
ALBUMIN SERPL-MCNC: 2.5 G/DL (ref 3.4–5)
ALBUMIN/GLOB SERPL: 0.5 {RATIO} (ref 0.8–1.7)
ALP SERPL-CCNC: 117 U/L (ref 45–117)
ALT SERPL-CCNC: 31 U/L (ref 16–61)
ANION GAP SERPL CALC-SCNC: 10 MMOL/L (ref 3–18)
AST SERPL-CCNC: 27 U/L (ref 10–38)
BASOPHILS # BLD: 0 K/UL (ref 0–0.1)
BASOPHILS NFR BLD: 0 % (ref 0–2)
BILIRUB SERPL-MCNC: 0.6 MG/DL (ref 0.2–1)
BUN SERPL-MCNC: 24 MG/DL (ref 7–18)
BUN/CREAT SERPL: 17 (ref 12–20)
CALCIUM SERPL-MCNC: 8.8 MG/DL (ref 8.5–10.1)
CHLORIDE SERPL-SCNC: 84 MMOL/L (ref 100–111)
CK MB CFR SERPL CALC: 2.4 % (ref 0–4)
CK MB SERPL-MCNC: 2 NG/ML (ref 5–25)
CK SERPL-CCNC: 82 U/L (ref 39–308)
CO2 SERPL-SCNC: 25 MMOL/L (ref 21–32)
CREAT SERPL-MCNC: 1.39 MG/DL (ref 0.6–1.3)
DIFFERENTIAL METHOD BLD: ABNORMAL
EOSINOPHIL # BLD: 0.2 K/UL (ref 0–0.4)
EOSINOPHIL NFR BLD: 1 % (ref 0–5)
ERYTHROCYTE [DISTWIDTH] IN BLOOD BY AUTOMATED COUNT: 12 % (ref 11.6–14.5)
GLOBULIN SER CALC-MCNC: 5.2 G/DL (ref 2–4)
GLUCOSE SERPL-MCNC: 168 MG/DL (ref 74–99)
HCT VFR BLD AUTO: 38.3 % (ref 36–48)
HGB BLD-MCNC: 13.7 G/DL (ref 13–16)
LACTATE BLD-SCNC: 1.84 MMOL/L (ref 0.4–2)
LYMPHOCYTES # BLD: 0.6 K/UL (ref 0.9–3.6)
LYMPHOCYTES NFR BLD: 3 % (ref 21–52)
MCH RBC QN AUTO: 30.5 PG (ref 24–34)
MCHC RBC AUTO-ENTMCNC: 35.8 G/DL (ref 31–37)
MCV RBC AUTO: 85.3 FL (ref 74–97)
MONOCYTES # BLD: 0.7 K/UL (ref 0.05–1.2)
MONOCYTES NFR BLD: 4 % (ref 3–10)
NEUTS BAND NFR BLD MANUAL: 4 %
NEUTS SEG # BLD: 17.1 K/UL (ref 1.8–8)
NEUTS SEG NFR BLD: 88 % (ref 40–73)
PLATELET # BLD AUTO: 354 K/UL (ref 135–420)
PLATELET COMMENTS,PCOM: ABNORMAL
PMV BLD AUTO: 8.8 FL (ref 9.2–11.8)
POTASSIUM SERPL-SCNC: 4 MMOL/L (ref 3.5–5.5)
PROT SERPL-MCNC: 7.7 G/DL (ref 6.4–8.2)
RBC # BLD AUTO: 4.49 M/UL (ref 4.35–5.65)
RBC MORPH BLD: ABNORMAL
SODIUM SERPL-SCNC: 119 MMOL/L (ref 136–145)
TROPONIN I SERPL-MCNC: <0.02 NG/ML (ref 0–0.04)
WBC # BLD AUTO: 18.6 K/UL (ref 4.6–13.2)

## 2021-05-05 PROCEDURE — 93005 ELECTROCARDIOGRAM TRACING: CPT

## 2021-05-05 PROCEDURE — 65270000029 HC RM PRIVATE

## 2021-05-05 PROCEDURE — 80053 COMPREHEN METABOLIC PANEL: CPT

## 2021-05-05 PROCEDURE — 71046 X-RAY EXAM CHEST 2 VIEWS: CPT

## 2021-05-05 PROCEDURE — 74011250636 HC RX REV CODE- 250/636: Performed by: EMERGENCY MEDICINE

## 2021-05-05 PROCEDURE — 65660000000 HC RM CCU STEPDOWN

## 2021-05-05 PROCEDURE — 87040 BLOOD CULTURE FOR BACTERIA: CPT

## 2021-05-05 PROCEDURE — 74011000250 HC RX REV CODE- 250: Performed by: EMERGENCY MEDICINE

## 2021-05-05 PROCEDURE — 96374 THER/PROPH/DIAG INJ IV PUSH: CPT

## 2021-05-05 PROCEDURE — 96376 TX/PRO/DX INJ SAME DRUG ADON: CPT

## 2021-05-05 PROCEDURE — 83605 ASSAY OF LACTIC ACID: CPT

## 2021-05-05 PROCEDURE — 84484 ASSAY OF TROPONIN QUANT: CPT

## 2021-05-05 PROCEDURE — 96375 TX/PRO/DX INJ NEW DRUG ADDON: CPT

## 2021-05-05 PROCEDURE — 99285 EMERGENCY DEPT VISIT HI MDM: CPT

## 2021-05-05 PROCEDURE — 96361 HYDRATE IV INFUSION ADD-ON: CPT

## 2021-05-05 PROCEDURE — 74011250637 HC RX REV CODE- 250/637: Performed by: EMERGENCY MEDICINE

## 2021-05-05 PROCEDURE — 85025 COMPLETE CBC W/AUTO DIFF WBC: CPT

## 2021-05-05 RX ORDER — KETOROLAC TROMETHAMINE 15 MG/ML
15 INJECTION, SOLUTION INTRAMUSCULAR; INTRAVENOUS
Status: COMPLETED | OUTPATIENT
Start: 2021-05-05 | End: 2021-05-05

## 2021-05-05 RX ORDER — ACETAMINOPHEN 325 MG/1
650 TABLET ORAL
Status: COMPLETED | OUTPATIENT
Start: 2021-05-05 | End: 2021-05-05

## 2021-05-05 RX ORDER — KETOROLAC TROMETHAMINE 15 MG/ML
30 INJECTION, SOLUTION INTRAMUSCULAR; INTRAVENOUS
Status: DISCONTINUED | OUTPATIENT
Start: 2021-05-05 | End: 2021-05-05

## 2021-05-05 RX ADMIN — VANCOMYCIN HYDROCHLORIDE 750 MG: 750 INJECTION, POWDER, LYOPHILIZED, FOR SOLUTION INTRAVENOUS at 22:04

## 2021-05-05 RX ADMIN — SODIUM CHLORIDE 1000 ML: 900 INJECTION, SOLUTION INTRAVENOUS at 20:03

## 2021-05-05 RX ADMIN — VANCOMYCIN HYDROCHLORIDE 1000 MG: 1 INJECTION, POWDER, LYOPHILIZED, FOR SOLUTION INTRAVENOUS at 20:49

## 2021-05-05 RX ADMIN — ACETAMINOPHEN 650 MG: 325 TABLET ORAL at 20:01

## 2021-05-05 RX ADMIN — KETOROLAC TROMETHAMINE 15 MG: 15 INJECTION, SOLUTION INTRAMUSCULAR; INTRAVENOUS at 23:46

## 2021-05-05 RX ADMIN — WATER 1 G: 1 INJECTION INTRAMUSCULAR; INTRAVENOUS; SUBCUTANEOUS at 20:43

## 2021-05-05 NOTE — ED TRIAGE NOTES
Patient states treated April 21st for cellulitis and presents today for weeping fluid  from his lower legs. Swelling noted, but patient states \"they have improved\".  Temp 100.1 in triage

## 2021-05-06 ENCOUNTER — APPOINTMENT (OUTPATIENT)
Dept: VASCULAR SURGERY | Age: 61
DRG: 872 | End: 2021-05-06
Attending: INTERNAL MEDICINE

## 2021-05-06 ENCOUNTER — ANESTHESIA EVENT (OUTPATIENT)
Dept: SURGERY | Age: 61
DRG: 872 | End: 2021-05-06

## 2021-05-06 ENCOUNTER — APPOINTMENT (OUTPATIENT)
Dept: NON INVASIVE DIAGNOSTICS | Age: 61
DRG: 872 | End: 2021-05-06
Attending: FAMILY MEDICINE

## 2021-05-06 LAB
ANION GAP SERPL CALC-SCNC: 10 MMOL/L (ref 3–18)
ANION GAP SERPL CALC-SCNC: 7 MMOL/L (ref 3–18)
ANION GAP SERPL CALC-SCNC: 8 MMOL/L (ref 3–18)
ANION GAP SERPL CALC-SCNC: 9 MMOL/L (ref 3–18)
APPEARANCE UR: CLEAR
ATRIAL RATE: 111 BPM
BACTERIA URNS QL MICRO: ABNORMAL /HPF
BILIRUB UR QL: NEGATIVE
BUN SERPL-MCNC: 20 MG/DL (ref 7–18)
BUN SERPL-MCNC: 22 MG/DL (ref 7–18)
BUN SERPL-MCNC: 22 MG/DL (ref 7–18)
BUN SERPL-MCNC: 23 MG/DL (ref 7–18)
BUN/CREAT SERPL: 19 (ref 12–20)
BUN/CREAT SERPL: 21 (ref 12–20)
CALCIUM SERPL-MCNC: 7.2 MG/DL (ref 8.5–10.1)
CALCIUM SERPL-MCNC: 7.4 MG/DL (ref 8.5–10.1)
CALCIUM SERPL-MCNC: 7.7 MG/DL (ref 8.5–10.1)
CALCIUM SERPL-MCNC: 8.1 MG/DL (ref 8.5–10.1)
CALCULATED P AXIS, ECG09: 49 DEGREES
CALCULATED R AXIS, ECG10: 69 DEGREES
CALCULATED T AXIS, ECG11: 84 DEGREES
CHLORIDE SERPL-SCNC: 93 MMOL/L (ref 100–111)
CHLORIDE SERPL-SCNC: 94 MMOL/L (ref 100–111)
CO2 SERPL-SCNC: 21 MMOL/L (ref 21–32)
CO2 SERPL-SCNC: 22 MMOL/L (ref 21–32)
CO2 SERPL-SCNC: 22 MMOL/L (ref 21–32)
CO2 SERPL-SCNC: 25 MMOL/L (ref 21–32)
COLOR UR: YELLOW
CREAT SERPL-MCNC: 1.05 MG/DL (ref 0.6–1.3)
CREAT SERPL-MCNC: 1.06 MG/DL (ref 0.6–1.3)
CREAT SERPL-MCNC: 1.06 MG/DL (ref 0.6–1.3)
CREAT SERPL-MCNC: 1.1 MG/DL (ref 0.6–1.3)
DIAGNOSIS, 93000: NORMAL
ECHO AO ROOT DIAM: 3.5 CM
ECHO LV E' LATERAL VELOCITY: 9.1 CM/S
ECHO LV INTERNAL DIMENSION DIASTOLIC: 4.56 CM (ref 4.2–5.9)
ECHO LV INTERNAL DIMENSION SYSTOLIC: 3.3 CM
ECHO LV IVSD: 0.74 CM (ref 0.6–1)
ECHO LV MASS 2D: 140.8 G (ref 88–224)
ECHO LV MASS INDEX 2D: 68.3 G/M2 (ref 49–115)
ECHO LV POSTERIOR WALL DIASTOLIC: 1.11 CM (ref 0.6–1)
ECHO LVOT CARDIAC OUTPUT: 7.88 LITER/MINUTE
ECHO LVOT DIAM: 2.45 CM
ECHO LVOT PEAK GRADIENT: 4.59 MMHG
ECHO LVOT PEAK VELOCITY: 106.66 CM/S
ECHO LVOT SV: 75.7 ML
ECHO LVOT VTI: 16.09 CM
ECHO MV A VELOCITY: 72.28 CM/S
ECHO MV E DECELERATION TIME (DT): 101.81 MS
ECHO MV E VELOCITY: 72.28 CM/S
ECHO MV E/A RATIO: 1
ECHO MV E/E' LATERAL: 7.94
ECHO RA AREA 4C: 11.84 CM2
ECHO RV TAPSE: 2.72 CM (ref 1.5–2)
EPITH CASTS URNS QL MICRO: NEGATIVE /LPF (ref 0–5)
GLUCOSE SERPL-MCNC: 108 MG/DL (ref 74–99)
GLUCOSE SERPL-MCNC: 125 MG/DL (ref 74–99)
GLUCOSE SERPL-MCNC: 81 MG/DL (ref 74–99)
GLUCOSE SERPL-MCNC: 94 MG/DL (ref 74–99)
GLUCOSE UR STRIP.AUTO-MCNC: NEGATIVE MG/DL
HGB UR QL STRIP: ABNORMAL
KETONES UR QL STRIP.AUTO: NEGATIVE MG/DL
LEUKOCYTE ESTERASE UR QL STRIP.AUTO: NEGATIVE
LVOT MG: 3.05 MMHG
NITRITE UR QL STRIP.AUTO: NEGATIVE
P-R INTERVAL, ECG05: 170 MS
PH UR STRIP: 5.5 [PH] (ref 5–8)
POTASSIUM SERPL-SCNC: 4.1 MMOL/L (ref 3.5–5.5)
POTASSIUM SERPL-SCNC: 4.1 MMOL/L (ref 3.5–5.5)
POTASSIUM SERPL-SCNC: 4.3 MMOL/L (ref 3.5–5.5)
POTASSIUM SERPL-SCNC: 4.6 MMOL/L (ref 3.5–5.5)
PROT UR STRIP-MCNC: 30 MG/DL
Q-T INTERVAL, ECG07: 306 MS
QRS DURATION, ECG06: 80 MS
QTC CALCULATION (BEZET), ECG08: 416 MS
RBC #/AREA URNS HPF: ABNORMAL /HPF (ref 0–5)
SODIUM SERPL-SCNC: 124 MMOL/L (ref 136–145)
SODIUM SERPL-SCNC: 124 MMOL/L (ref 136–145)
SODIUM SERPL-SCNC: 125 MMOL/L (ref 136–145)
SODIUM SERPL-SCNC: 126 MMOL/L (ref 136–145)
SODIUM UR-SCNC: 16 MMOL/L (ref 20–110)
SP GR UR REFRACTOMETRY: 1.01 (ref 1–1.03)
UROBILINOGEN UR QL STRIP.AUTO: 0.2 EU/DL (ref 0.2–1)
VANCOMYCIN SERPL-MCNC: 8.7 UG/ML (ref 5–40)
VENTRICULAR RATE, ECG03: 111 BPM
WBC URNS QL MICRO: NEGATIVE /HPF (ref 0–5)

## 2021-05-06 PROCEDURE — 80202 ASSAY OF VANCOMYCIN: CPT

## 2021-05-06 PROCEDURE — 83935 ASSAY OF URINE OSMOLALITY: CPT

## 2021-05-06 PROCEDURE — 99223 1ST HOSP IP/OBS HIGH 75: CPT | Performed by: FAMILY MEDICINE

## 2021-05-06 PROCEDURE — 84300 ASSAY OF URINE SODIUM: CPT

## 2021-05-06 PROCEDURE — 93970 EXTREMITY STUDY: CPT

## 2021-05-06 PROCEDURE — 81001 URINALYSIS AUTO W/SCOPE: CPT

## 2021-05-06 PROCEDURE — 74011250636 HC RX REV CODE- 250/636: Performed by: INTERNAL MEDICINE

## 2021-05-06 PROCEDURE — 93306 TTE W/DOPPLER COMPLETE: CPT

## 2021-05-06 PROCEDURE — 2709999900 HC NON-CHARGEABLE SUPPLY

## 2021-05-06 PROCEDURE — 74011250636 HC RX REV CODE- 250/636: Performed by: FAMILY MEDICINE

## 2021-05-06 PROCEDURE — 74011250637 HC RX REV CODE- 250/637: Performed by: FAMILY MEDICINE

## 2021-05-06 PROCEDURE — 93306 TTE W/DOPPLER COMPLETE: CPT | Performed by: INTERNAL MEDICINE

## 2021-05-06 PROCEDURE — 74011250636 HC RX REV CODE- 250/636: Performed by: EMERGENCY MEDICINE

## 2021-05-06 PROCEDURE — 74011000250 HC RX REV CODE- 250: Performed by: INTERNAL MEDICINE

## 2021-05-06 PROCEDURE — 36415 COLL VENOUS BLD VENIPUNCTURE: CPT

## 2021-05-06 PROCEDURE — 65270000029 HC RM PRIVATE

## 2021-05-06 PROCEDURE — 80048 BASIC METABOLIC PNL TOTAL CA: CPT

## 2021-05-06 RX ORDER — ACETAMINOPHEN 325 MG/1
650 TABLET ORAL
Status: DISCONTINUED | OUTPATIENT
Start: 2021-05-06 | End: 2021-05-13 | Stop reason: HOSPADM

## 2021-05-06 RX ORDER — SODIUM CHLORIDE 0.9 % (FLUSH) 0.9 %
5-40 SYRINGE (ML) INJECTION EVERY 8 HOURS
Status: DISCONTINUED | OUTPATIENT
Start: 2021-05-06 | End: 2021-05-13 | Stop reason: HOSPADM

## 2021-05-06 RX ORDER — DIPHENHYDRAMINE HCL 25 MG
50 CAPSULE ORAL
Status: DISCONTINUED | OUTPATIENT
Start: 2021-05-06 | End: 2021-05-13 | Stop reason: HOSPADM

## 2021-05-06 RX ORDER — POLYETHYLENE GLYCOL 3350 17 G/17G
17 POWDER, FOR SOLUTION ORAL DAILY PRN
Status: DISCONTINUED | OUTPATIENT
Start: 2021-05-06 | End: 2021-05-13 | Stop reason: HOSPADM

## 2021-05-06 RX ORDER — LORAZEPAM 2 MG/ML
3 INJECTION INTRAMUSCULAR
Status: DISCONTINUED | OUTPATIENT
Start: 2021-05-06 | End: 2021-05-13 | Stop reason: HOSPADM

## 2021-05-06 RX ORDER — SODIUM CHLORIDE 0.9 % (FLUSH) 0.9 %
5-40 SYRINGE (ML) INJECTION AS NEEDED
Status: DISCONTINUED | OUTPATIENT
Start: 2021-05-06 | End: 2021-05-13 | Stop reason: HOSPADM

## 2021-05-06 RX ORDER — ACETAMINOPHEN 650 MG/1
650 SUPPOSITORY RECTAL
Status: DISCONTINUED | OUTPATIENT
Start: 2021-05-06 | End: 2021-05-13 | Stop reason: HOSPADM

## 2021-05-06 RX ORDER — SODIUM CHLORIDE 9 MG/ML
75 INJECTION, SOLUTION INTRAVENOUS CONTINUOUS
Status: DISCONTINUED | OUTPATIENT
Start: 2021-05-06 | End: 2021-05-06

## 2021-05-06 RX ORDER — IBUPROFEN 600 MG/1
600 TABLET ORAL
Status: DISCONTINUED | OUTPATIENT
Start: 2021-05-06 | End: 2021-05-07

## 2021-05-06 RX ORDER — HYDRALAZINE HYDROCHLORIDE 20 MG/ML
10 INJECTION INTRAMUSCULAR; INTRAVENOUS
Status: DISCONTINUED | OUTPATIENT
Start: 2021-05-06 | End: 2021-05-13 | Stop reason: HOSPADM

## 2021-05-06 RX ORDER — LORAZEPAM 1 MG/1
2 TABLET ORAL
Status: DISCONTINUED | OUTPATIENT
Start: 2021-05-06 | End: 2021-05-13 | Stop reason: HOSPADM

## 2021-05-06 RX ORDER — LORAZEPAM 2 MG/ML
2 INJECTION INTRAMUSCULAR
Status: DISCONTINUED | OUTPATIENT
Start: 2021-05-06 | End: 2021-05-13 | Stop reason: HOSPADM

## 2021-05-06 RX ORDER — PROMETHAZINE HYDROCHLORIDE 12.5 MG/1
12.5 TABLET ORAL
Status: DISCONTINUED | OUTPATIENT
Start: 2021-05-06 | End: 2021-05-13 | Stop reason: HOSPADM

## 2021-05-06 RX ORDER — THERA TABS 400 MCG
1 TAB ORAL DAILY
Status: DISCONTINUED | OUTPATIENT
Start: 2021-05-06 | End: 2021-05-13 | Stop reason: HOSPADM

## 2021-05-06 RX ORDER — ONDANSETRON 2 MG/ML
4 INJECTION INTRAMUSCULAR; INTRAVENOUS
Status: DISCONTINUED | OUTPATIENT
Start: 2021-05-06 | End: 2021-05-13 | Stop reason: HOSPADM

## 2021-05-06 RX ORDER — FOLIC ACID 1 MG/1
1 TABLET ORAL DAILY
Status: DISCONTINUED | OUTPATIENT
Start: 2021-05-06 | End: 2021-05-13 | Stop reason: HOSPADM

## 2021-05-06 RX ORDER — ENOXAPARIN SODIUM 100 MG/ML
40 INJECTION SUBCUTANEOUS DAILY
Status: DISCONTINUED | OUTPATIENT
Start: 2021-05-06 | End: 2021-05-13 | Stop reason: HOSPADM

## 2021-05-06 RX ORDER — LORAZEPAM 2 MG/ML
1 INJECTION INTRAMUSCULAR
Status: DISCONTINUED | OUTPATIENT
Start: 2021-05-06 | End: 2021-05-13 | Stop reason: HOSPADM

## 2021-05-06 RX ORDER — LANOLIN ALCOHOL/MO/W.PET/CERES
100 CREAM (GRAM) TOPICAL DAILY
Status: DISCONTINUED | OUTPATIENT
Start: 2021-05-06 | End: 2021-05-13 | Stop reason: HOSPADM

## 2021-05-06 RX ORDER — LORAZEPAM 1 MG/1
1 TABLET ORAL
Status: DISCONTINUED | OUTPATIENT
Start: 2021-05-06 | End: 2021-05-13 | Stop reason: HOSPADM

## 2021-05-06 RX ADMIN — VANCOMYCIN HYDROCHLORIDE 1000 MG: 1 INJECTION, POWDER, LYOPHILIZED, FOR SOLUTION INTRAVENOUS at 12:52

## 2021-05-06 RX ADMIN — SODIUM CHLORIDE 75 ML/HR: 900 INJECTION, SOLUTION INTRAVENOUS at 02:09

## 2021-05-06 RX ADMIN — Medication 10 ML: at 17:47

## 2021-05-06 RX ADMIN — DIPHENHYDRAMINE HYDROCHLORIDE 50 MG: 25 CAPSULE ORAL at 22:28

## 2021-05-06 RX ADMIN — Medication 100 MG: at 08:29

## 2021-05-06 RX ADMIN — DIPHENHYDRAMINE HYDROCHLORIDE 50 MG: 25 CAPSULE ORAL at 08:29

## 2021-05-06 RX ADMIN — ENOXAPARIN SODIUM 40 MG: 40 INJECTION SUBCUTANEOUS at 08:33

## 2021-05-06 RX ADMIN — Medication 10 ML: at 06:33

## 2021-05-06 RX ADMIN — Medication 10 ML: at 22:29

## 2021-05-06 RX ADMIN — DIPHENHYDRAMINE HYDROCHLORIDE 50 MG: 25 CAPSULE ORAL at 02:20

## 2021-05-06 RX ADMIN — ACETAMINOPHEN 650 MG: 325 TABLET ORAL at 22:28

## 2021-05-06 RX ADMIN — CEFTRIAXONE SODIUM 2 G: 2 INJECTION, POWDER, FOR SOLUTION INTRAMUSCULAR; INTRAVENOUS at 22:28

## 2021-05-06 RX ADMIN — DIPHENHYDRAMINE HYDROCHLORIDE 50 MG: 25 CAPSULE ORAL at 14:15

## 2021-05-06 RX ADMIN — HYDRALAZINE HYDROCHLORIDE 10 MG: 20 INJECTION, SOLUTION INTRAMUSCULAR; INTRAVENOUS at 22:28

## 2021-05-06 RX ADMIN — VANCOMYCIN HYDROCHLORIDE 1000 MG: 1 INJECTION, POWDER, LYOPHILIZED, FOR SOLUTION INTRAVENOUS at 23:25

## 2021-05-06 RX ADMIN — HYDRALAZINE HYDROCHLORIDE 10 MG: 20 INJECTION, SOLUTION INTRAMUSCULAR; INTRAVENOUS at 03:10

## 2021-05-06 RX ADMIN — FOLIC ACID 1 MG: 1 TABLET ORAL at 08:29

## 2021-05-06 RX ADMIN — ACETAMINOPHEN 650 MG: 325 TABLET ORAL at 14:15

## 2021-05-06 RX ADMIN — THERA TABS 1 TABLET: TAB at 08:29

## 2021-05-06 RX ADMIN — HYDRALAZINE HYDROCHLORIDE 10 MG: 20 INJECTION, SOLUTION INTRAMUSCULAR; INTRAVENOUS at 12:47

## 2021-05-06 RX ADMIN — ACETAMINOPHEN 650 MG: 325 TABLET ORAL at 08:29

## 2021-05-06 RX ADMIN — Medication 10 ML: at 02:12

## 2021-05-06 NOTE — ACP (ADVANCE CARE PLANNING)
Advance Care Planning     General Advance Care Planning (ACP) Conversation      Date of Conversation: 5/6/2021  Conducted with: Patient with Decision Making Capacity    Healthcare Decision Maker: no    Today we discussed Healthcare Decision Makers. The patient is considering options. Content/Action Overview:   DECLINED ACP conversation - will revisit periodically       YNES Romero, RN  Pager # [phone]  Care Manager

## 2021-05-06 NOTE — PROGRESS NOTES
Patient received in room 467 on the stretcher from [facility] ER via [address] transportation services. Patient noted to be rubbing legs on the pad and complaining of intense itching on both legs. No other stated or noted distress. Patient's skin is assessed by two nurses and both legs are noted to be swollen, red, and weeping. Patient's gown is changed, VS checked, bed in low position, and pt is instructed on how to use the call garcia. Dr Sai Land has been notified of pt's arrival and his complain of itching legs.

## 2021-05-06 NOTE — PROGRESS NOTES
Problem: Falls - Risk of  Goal: *Absence of Falls  Description: Document Ana Núñez Fall Risk and appropriate interventions in the flowsheet. Outcome: Progressing Towards Goal  Note: Fall Risk Interventions:            Medication Interventions: Patient to call before getting OOB                   Problem: Patient Education: Go to Patient Education Activity  Goal: Patient/Family Education  Outcome: Progressing Towards Goal     Problem: Pressure Injury - Risk of  Goal: *Prevention of pressure injury  Description: Document Xavier Scale and appropriate interventions in the flowsheet. Outcome: Progressing Towards Goal  Note: Pressure Injury Interventions:       Moisture Interventions: Absorbent underpads    Activity Interventions: Pressure redistribution bed/mattress(bed type)    Mobility Interventions: HOB 30 degrees or less    Nutrition Interventions: Document food/fluid/supplement intake                     Problem: Patient Education: Go to Patient Education Activity  Goal: Patient/Family Education  Outcome: Progressing Towards Goal     Problem: Pain  Goal: *Control of Pain  Outcome: Progressing Towards Goal  Goal: *PALLIATIVE CARE:  Alleviation of Pain  Outcome: Progressing Towards Goal     Problem: Impaired Skin Integrity/Pressure Injury Treatment  Goal: *Improvement of Existing Pressure Injury  Outcome: Progressing Towards Goal  Goal: *Prevention of pressure injury  Description: Document Xavier Scale and appropriate interventions in the flowsheet.   Outcome: Progressing Towards Goal  Note: Pressure Injury Interventions:       Moisture Interventions: Absorbent underpads    Activity Interventions: Pressure redistribution bed/mattress(bed type)    Mobility Interventions: HOB 30 degrees or less    Nutrition Interventions: Document food/fluid/supplement intake

## 2021-05-06 NOTE — PROGRESS NOTES
Substitution Information per the P&T Committee approved Therapeutic Interchanges Policy    Nonformulary Medication Formulary Interchange   ketorolac (TORADOL) inj 30 mg Ketorolac (TORADOL) inj 15 mg

## 2021-05-06 NOTE — PROGRESS NOTES
Bedside shift change report given to WILLIAM Howard (oncoming nurse) by WILLIAM Vicente (offgoing nurse). Report included the following information SBAR, Kardex, Intake/Output, MAR and Recent Results.

## 2021-05-06 NOTE — ED PROVIDER NOTES
EMERGENCY DEPARTMENT HISTORY AND PHYSICAL EXAM    8:08 PM      Date: 5/5/2021  Patient Name: Tin Winchester    History of Presenting Illness     Chief Complaint   Patient presents with    Skin Infection         History Provided By: patient    Additional History (Context): Tin Winchester is a 62 y.o. male presents with bilateral leg redness cellulitis, was treated on 4/21/2021 with Bactrim and Keflex. He feels it is improved. He had a fever today and so came to the ER. Quite disheveled. Does not wear shoes and socks routinely his feet are wet. No difficulty breathing some mild to moderate pain in the legs but states improved. Some weeping of fluid in the bilateral legs. History and review of systems limited by poor historian. Giovanny Barajas PCP: Raymond Mena MD    Chief Complaint:   Duration:    Timing:    Location:   Quality:   Severity:   Modifying Factors:   Associated Symptoms:       Current Facility-Administered Medications   Medication Dose Route Frequency Provider Last Rate Last Admin    cefTRIAXone (ROCEPHIN) 1 g in sterile water (preservative free) 10 mL IV syringe  1 g IntraVENous Q24H Leni WALKER MD   1 g at 05/05/21 2043    VANCOMYCIN INFORMATION NOTE   Other Rx Dosing/Monitoring Ingrid Hardy MD         Current Outpatient Medications   Medication Sig Dispense Refill    ibuprofen (MOTRIN IB) 200 mg tablet Take  by mouth.  meloxicam (MOBIC) 15 mg tablet Take 1 Tab by mouth daily (with breakfast). 30 Tab 1    naproxen (NAPROSYN) 375 mg tablet Take 1 Tab by mouth two (2) times daily (with meals). 20 Tab 0    traMADol (ULTRAM) 50 mg tablet Take 1 Tab by mouth every six (6) hours as needed for Pain. Max Daily Amount: 200 mg. 12 Tab 0       Past History     Past Medical History:  History reviewed. No pertinent past medical history.     Past Surgical History:  Past Surgical History:   Procedure Laterality Date    HX ORTHOPAEDIC         Family History:  Family History Problem Relation Age of Onset    Diabetes Mother     No Known Problems Father     Diabetes Maternal Grandmother        Social History:  Social History     Tobacco Use    Smoking status: Former Smoker     Types: Cigarettes     Quit date: 3/26/1998     Years since quittin.1    Smokeless tobacco: Never Used    Tobacco comment: AROUND WIFE WHO SMOKES/2ND HAND SMOKE   Substance Use Topics    Alcohol use: Yes     Alcohol/week: 18.0 standard drinks     Types: 18 Cans of beer per week    Drug use: No       Allergies:  No Known Allergies      Review of Systems     Review of Systems   Constitutional: Positive for fever. Respiratory: Negative for cough and shortness of breath. Cardiovascular: Positive for leg swelling. Negative for chest pain. Gastrointestinal: Negative for vomiting. Skin: Positive for rash. Neurological: Negative for syncope. Physical Exam       Patient Vitals for the past 12 hrs:   Temp Pulse Resp BP SpO2   21 2300 -- -- -- (!) 181/83 100 %   21 2230 -- -- -- (!) 170/86 --   210 -- -- -- (!) 187/99 96 %   21 -- -- -- (!) 174/98 92 %   21 -- -- -- (!) 194/94 100 %   21 -- -- -- (!) 153/101 100 %   21 -- -- -- (!) 165/99 100 %   21 193 100.1 °F (37.8 °C) (!) 121 18 (!) 164/98 97 %       Physical Exam  Vitals signs and nursing note reviewed. Constitutional:       General: He is not in acute distress. Appearance: He is well-developed. He is obese. He is not ill-appearing. Comments: Disheveled, wearing a paper scrub top which he says he is worn on and off since his visit on the    HENT:      Head: Normocephalic and atraumatic. Eyes:      General: No scleral icterus. Conjunctiva/sclera: Conjunctivae normal.   Neck:      Musculoskeletal: Normal range of motion and neck supple. Vascular: No JVD. Cardiovascular:      Rate and Rhythm: Normal rate and regular rhythm.       Heart sounds: Normal heart sounds. Comments: 4 intact extremity pulses  Pulmonary:      Effort: Pulmonary effort is normal.      Breath sounds: Normal breath sounds. Abdominal:      Palpations: Abdomen is soft. There is no mass. Tenderness: There is no abdominal tenderness. Musculoskeletal: Normal range of motion. Comments: Bilateral lower extremities markedly reddened with extensive weeping. They are warm equally swollen. Intact pulses. Redness extends to the pant line at the proximal third of the leg   Lymphadenopathy:      Cervical: No cervical adenopathy. Skin:     General: Skin is warm and dry. Neurological:      Mental Status: He is alert. Diagnostic Study Results   Labs -  Recent Results (from the past 12 hour(s))   POC LACTIC ACID    Collection Time: 05/05/21  8:04 PM   Result Value Ref Range    Lactic Acid (POC) 1.84 0.40 - 2.00 mmol/L   CBC WITH AUTOMATED DIFF    Collection Time: 05/05/21  8:10 PM   Result Value Ref Range    WBC 18.6 (H) 4.6 - 13.2 K/uL    RBC 4.49 4.35 - 5.65 M/uL    HGB 13.7 13.0 - 16.0 g/dL    HCT 38.3 36.0 - 48.0 %    MCV 85.3 74.0 - 97.0 FL    MCH 30.5 24.0 - 34.0 PG    MCHC 35.8 31.0 - 37.0 g/dL    RDW 12.0 11.6 - 14.5 %    PLATELET 107 370 - 583 K/uL    MPV 8.8 (L) 9.2 - 11.8 FL    NEUTROPHILS 88 (H) 40 - 73 %    BAND NEUTROPHILS 4 %    LYMPHOCYTES 3 (L) 21 - 52 %    MONOCYTES 4 3 - 10 %    EOSINOPHILS 1 0 - 5 %    BASOPHILS 0 0 - 2 %    ABS. NEUTROPHILS 17.1 (H) 1.8 - 8.0 K/UL    ABS. LYMPHOCYTES 0.6 (L) 0.9 - 3.6 K/UL    ABS. MONOCYTES 0.7 0.05 - 1.2 K/UL    ABS. EOSINOPHILS 0.2 0.0 - 0.4 K/UL    ABS.  BASOPHILS 0.0 0.0 - 0.1 K/UL    DF MANUAL      PLATELET COMMENTS ADEQUATE PLATELETS      RBC COMMENTS NORMOCYTIC, NORMOCHROMIC     METABOLIC PANEL, COMPREHENSIVE    Collection Time: 05/05/21  8:10 PM   Result Value Ref Range    Sodium 119 (LL) 136 - 145 mmol/L    Potassium 4.0 3.5 - 5.5 mmol/L    Chloride 84 (L) 100 - 111 mmol/L    CO2 25 21 - 32 mmol/L    Anion gap 10 3.0 - 18 mmol/L    Glucose 168 (H) 74 - 99 mg/dL    BUN 24 (H) 7.0 - 18 MG/DL    Creatinine 1.39 (H) 0.6 - 1.3 MG/DL    BUN/Creatinine ratio 17 12 - 20      GFR est AA >60 >60 ml/min/1.73m2    GFR est non-AA 52 (L) >60 ml/min/1.73m2    Calcium 8.8 8.5 - 10.1 MG/DL    Bilirubin, total 0.6 0.2 - 1.0 MG/DL    ALT (SGPT) 31 16 - 61 U/L    AST (SGOT) 27 10 - 38 U/L    Alk. phosphatase 117 45 - 117 U/L    Protein, total 7.7 6.4 - 8.2 g/dL    Albumin 2.5 (L) 3.4 - 5.0 g/dL    Globulin 5.2 (H) 2.0 - 4.0 g/dL    A-G Ratio 0.5 (L) 0.8 - 1.7     CARDIAC PANEL,(CK, CKMB & TROPONIN)    Collection Time: 05/05/21  8:10 PM   Result Value Ref Range    CK - MB 2.0 <3.6 ng/ml    CK-MB Index 2.4 0.0 - 4.0 %    CK 82 39 - 308 U/L    Troponin-I, QT <0.02 0.0 - 0.045 NG/ML   EKG, 12 LEAD, INITIAL    Collection Time: 05/05/21  8:23 PM   Result Value Ref Range    Ventricular Rate 111 BPM    Atrial Rate 111 BPM    P-R Interval 170 ms    QRS Duration 80 ms    Q-T Interval 306 ms    QTC Calculation (Bezet) 416 ms    Calculated P Axis 49 degrees    Calculated R Axis 69 degrees    Calculated T Axis 84 degrees    Diagnosis       Sinus tachycardia  Otherwise normal ECG  No previous ECGs available         Radiologic Studies -   XR CHEST PA LAT   Final Result   1. No acute infiltrate or effusion. Xr Chest Pa Lat    Result Date: 5/5/2021  EXAM: Chest Radiographs INDICATION:  Sepsis TECHNIQUE: PA and lateral views of the chest COMPARISON: None. FINDINGS: No pneumothorax identified. The lungs are clear. No infiltrates identified. No effusions appreciated. The cardiomediastinal silhouette is unremarkable. The pulmonary vascularity is unremarkable. Degenerative changes of the spine. 1.  No acute infiltrate or effusion.       Medications ordered:   Medications   cefTRIAXone (ROCEPHIN) 1 g in sterile water (preservative free) 10 mL IV syringe (1 g IntraVENous Given 5/5/21 2043)   VANCOMYCIN INFORMATION NOTE (has no administration in time range)   sodium chloride 0.9 % bolus infusion 1,000 mL (0 mL IntraVENous IV Completed 5/5/21 2130)   sodium chloride 0.9 % bolus infusion 1,000 mL (0 mL IntraVENous IV Completed 5/5/21 2200)   acetaminophen (TYLENOL) tablet 650 mg (650 mg Oral Given 5/5/21 2001)   vancomycin (VANCOCIN) 1,000 mg in 0.9% sodium chloride 250 mL (VIAL-MATE) (1,000 mg IntraVENous Given 5/5/21 2049)     Followed by   vancomycin (VANCOCIN) 750 mg in 0.9% sodium chloride 250 mL (VIAL-MATE) (750 mg IntraVENous Given 5/5/21 2204)   ketorolac (TORADOL) injection 15 mg (15 mg IntraVENous Given 5/5/21 2346)         Medical Decision Making   Initial Medical Decision Making and DDx:  Treat for sepsis antibiotics ordered. He is a poor historian, will get screening EKG troponin chest x-ray anticipate admission. Do not suspect neck fascia at this point. No subcu emphysema no pain out of proportion. ED Course: Progress Notes, Reevaluation, and Consults:  ED Course as of May 05 2347   Wed May 05, 2021   2027 Twelve-lead EKG, sinus tachycardia at 111 no acute process    [CB]   2150 Updated patient and wife, plan for admission.    [CB]   3354 Notify Dr. Cory Porter on perfect serve for admission, borderline fever elevated white count, cultures and antibiotics. Also hyponatremia. [CB]   3268 Dr. Cory Porter acknowledged accepts admission to telemetry    [CB]      ED Course User Index  [CB] Karin Jones MD     Critical care time 30 minutes management of sepsis extensive cellulitis requiring fluid resuscitation. I am the first provider for this patient. I reviewed the vital signs, available nursing notes, past medical history, past surgical history, family history and social history.     Patient Vitals for the past 12 hrs:   Temp Pulse Resp BP SpO2   05/05/21 2300 -- -- -- (!) 181/83 100 %   05/05/21 2230 -- -- -- (!) 170/86 --   05/05/21 2200 -- -- -- (!) 187/99 96 %   05/05/21 2130 -- -- -- (!) 174/98 92 %   05/05/21 2100 -- -- -- [place] 194/94 100 %   05/05/21 2051 -- -- -- (!) 153/101 100 %   05/05/21 2000 -- -- -- (!) 165/99 100 %   05/05/21 1939 100.1 °F (37.8 °C) (!) 121 18 (!) 164/98 97 %       Vital Signs-Reviewed the patient's vital signs. Pulse Oximetry Analysis, Cardiac Monitor, 12 lead ekg: No hypoxia on room air*  Interpreted by the EP. Records Reviewed: Nursing notes reviewed (Time of Review: 8:08 PM)    Procedures:   Critical Care Time:   Aspirin: (was aspirin given for stroke?)    Diagnosis     Clinical Impression:   1. Cellulitis of lower extremity, unspecified laterality    2. Sepsis without acute organ dysfunction, due to unspecified organism ([facility] [address]    3. Hyponatremia        Disposition: Admitted      Follow-up Information    None          Patient's Medications   Start Taking    No medications on file   Continue Taking    IBUPROFEN (MOTRIN IB) 200 MG TABLET    Take  by mouth. MELOXICAM (MOBIC) 15 MG TABLET    Take 1 Tab by mouth daily (with breakfast). NAPROXEN (NAPROSYN) 375 MG TABLET    Take 1 Tab by mouth two (2) times daily (with meals). TRAMADOL (ULTRAM) 50 MG TABLET    Take 1 Tab by mouth every six (6) hours as needed for Pain. Max Daily Amount: 200 mg.    These Medications have changed    No medications on file   Stop Taking    No medications on file     _______________________________    Notes:    Marion Sommers MD using Dragon dictation      _______________________________

## 2021-05-06 NOTE — CONSULTS
Infectious Disease Consultation Note        Reason: Bilateral leg cellulitis    Current abx Prior abx   Ceftriaxone, vancomycin since 5/5/2021      Lines:       Assessment :    71-year-old man with past medical history significant for obesity, upper extremity cellulitis 4 years ago, arthritis admitted to [facility] on 5/5/2021 with increasing erythema/pain bilateral legs for several weeks. status post steroid injection in both knees on 3/18/21  H/o tick bite on abdomen around 4/18/21    Now with 5 week h/o increasing bilateral leg swelling, 3 week h/o increasing redness/pain bilateral legs with blackish discoloration of legs, cyanotic appearance of feet    Patient presents with a highly complex clinical picture. Clinical presentation c/w acute gangrenous cellulitis of both legs    Exact microbial etiology of infection is not entirely clear at this point. Preceding history of steroid injection, atypical presentation. Recommend to rule out atypical mycobacterial, fungal, gram-negative infections including Pseudomonas    Recommendations:    1. Continue ceftriaxone, vancomycin for now-increase ceftriaxone to 2 g IV every 24 hours  2. Recommend to obtain surgery consult, skin biopsy-send specimen for histology, bacterial/fungal/AFB cultures  3. Obtain arterial duplex, venous duplex both lower extremity  4. Monitor CBC, clinically    Thank you for consultation request. Above plan was discussed in details with patient, RN and dr Harish Gleason. Please call me if any further questions or concerns. Will continue to participate in the care of this patient. HPI:    71-year-old man with past medical history significant for obesity, upper extremity cellulitis 4 years ago, arthritis admitted to [facility] on 5/5/2021 with increasing erythema/pain bilateral legs for several weeks. Obtained history by talking to patient, review of [facility] care records.   Patient was seen in orthopedic clinic on 3/18/2021 with bilateral knee pain status post steroid injection in both knees. Subsequently patient started noticing increasing swelling of the legs. He was seen in [address] emergency room with redness of both legs, abdomen. Apparently at that time patient complained of 3-week history of swelling of the legs and 3-day history of redness of the legs. He also gives a history of tick bite on his abdomen. [facility] spotted fever, Lyme serologies were sent and they were negative. Patient declined admission at that time. It appears the patient was set up to be seen by [address] [place] [address] but states that he has not had any follow-up since being discharged from the emergency department in the early morning hours of 4/21/2020. On arrival to [address] yesterday evening, patient was noted to have significant erythema of both lower extremities involving the mid calf down to ankles/feet. WBCs were noted to be elevated and patient has been tachycardic since arrival to [address] [place] [address] Drive. He was started on ceftriaxone, vancomycin. Transferred to [place] for admission. I have been consulted for further recommendations. Patient denies any fever or chills throughout this time. He denies any trauma to bilateral legs. He denies immersion of his legs in salt water or fresh water in the past few weeks. Denies any insect bite/tick bite or animal bite/scratches. No prior history of MRSA. Patient was in significant discomfort due to bilateral lower extremity pain at the time of my evaluation. History reviewed. No pertinent past medical history. Past Surgical History:   Procedure Laterality Date    HX ORTHOPAEDIC         home Medication List    Details   ibuprofen (MOTRIN IB) 200 mg tablet Take  by mouth.      meloxicam (MOBIC) 15 mg tablet Take 1 Tab by mouth daily (with breakfast).   Qty: 30 Tab, Refills: 1    Associated Diagnoses: Cellulitis of right hand      naproxen (NAPROSYN) 375 mg tablet Take 1 Tab by mouth two (2) times daily (with meals). Qty: 20 Tab, Refills: 0      traMADol (ULTRAM) 50 mg tablet Take 1 Tab by mouth every six (6) hours as needed for Pain. Max Daily Amount: 200 mg. Qty: 12 Tab, Refills: 0    Associated Diagnoses: Arthritis of right hand             Current Facility-Administered Medications   Medication Dose Route Frequency    sodium chloride (NS) flush 5-40 mL  5-40 mL IntraVENous Q8H    sodium chloride (NS) flush 5-40 mL  5-40 mL IntraVENous PRN    acetaminophen (TYLENOL) tablet 650 mg  650 mg Oral Q6H PRN    Or    acetaminophen (TYLENOL) suppository 650 mg  650 mg Rectal Q6H PRN    polyethylene glycol (MIRALAX) packet 17 g  17 g Oral DAILY PRN    promethazine (PHENERGAN) tablet 12.5 mg  12.5 mg Oral Q6H PRN    Or    ondansetron (ZOFRAN) injection 4 mg  4 mg IntraVENous Q6H PRN    enoxaparin (LOVENOX) injection 40 mg  40 mg SubCUTAneous DAILY    diphenhydrAMINE (BENADRYL) capsule 50 mg  50 mg Oral Q6H PRN    hydrALAZINE (APRESOLINE) 20 mg/mL injection 10 mg  10 mg IntraVENous Q6H PRN    sodium chloride (NS) flush 5-40 mL  5-40 mL IntraVENous Q8H    sodium chloride (NS) flush 5-40 mL  5-40 mL IntraVENous PRN    LORazepam (ATIVAN) tablet 1 mg  1 mg Oral Q1H PRN    Or    LORazepam (ATIVAN) injection 1 mg  1 mg IntraVENous Q1H PRN    LORazepam (ATIVAN) tablet 2 mg  2 mg Oral Q1H PRN    Or    LORazepam (ATIVAN) injection 2 mg  2 mg IntraVENous Q1H PRN    LORazepam (ATIVAN) injection 3 mg  3 mg IntraVENous Q15MIN PRN    thiamine HCL (B-1) tablet 100 mg  100 mg Oral DAILY    folic acid (FOLVITE) tablet 1 mg  1 mg Oral DAILY    therapeutic multivitamin (THERAGRAN) tablet 1 Tab  1 Tab Oral DAILY    cefTRIAXone (ROCEPHIN) 1 g in sterile water (preservative free) 10 mL IV syringe  1 g IntraVENous Q24H    VANCOMYCIN INFORMATION NOTE   Other Rx Dosing/Monitoring       Allergies: Patient has no known allergies.     Family History   Problem Relation Age of Onset    Diabetes Mother     No Known Problems Father    [employer] Diabetes Maternal Grandmother      Social History     Socioeconomic History    Marital status:      Spouse name: Not on file    Number of children: Not on file    Years of education: Not on file    Highest education level: Not on file   Occupational History    Not on file   Social Needs    Financial resource strain: Not on file    Food insecurity     Worry: Not on file     Inability: Not on file    Transportation needs     Medical: Not on file     Non-medical: Not on file   Tobacco Use    Smoking status: Former Smoker     Types: Cigarettes     Quit date: 3/26/1998     Years since quittin.1    Smokeless tobacco: Never Used    Tobacco comment: AROUND WIFE WHO SMOKES/2ND HAND SMOKE   Substance and Sexual Activity    Alcohol use:  Yes     Alcohol/week: 18.0 standard drinks     Types: 18 Cans of beer per week    Drug use: No    Sexual activity: Not on file   Lifestyle    Physical activity     Days per week: Not on file     Minutes per session: Not on file    Stress: Not on file   Relationships    Social connections     Talks on phone: Not on file     Gets together: Not on file     Attends Jewish service: Not on file     Active member of club or organization: Not on file     Attends meetings of clubs or organizations: Not on file     Relationship status: Not on file    Intimate partner violence     Fear of current or ex partner: Not on file     Emotionally abused: Not on file     Physically abused: Not on file     Forced sexual activity: Not on file   Other Topics Concern    Not on file   Social History Narrative    Not on file     Social History     Tobacco Use   Smoking Status Former Smoker    Types: Cigarettes    Quit date: 3/26/1998    Years since quittin.1   Smokeless Tobacco Never Used   Tobacco Comment    AROUND WIFE WHO SMOKES/2ND HAND SMOKE        Temp (24hrs), Av.5 °F (36.9 °C), Min:97.9 °F (36.6 °C), Max:100.1 °F (37.8 °C)    Visit Vitals  BP (!) 153/89   Pulse (!) 108   Temp 98.1 °F (36.7 °C)   Resp 18   Ht 5' 11\" (1.803 m)   Wt 86.2 kg (190 lb)   SpO2 96%   BMI 26.50 kg/m²       ROS: 12 point ROS obtained in details. Pertinent positives as mentioned in HPI,   otherwise negative    Physical Exam:    Vitals signs and nursing note reviewed. Constitutional:          Appearance: He is well-developed. obese. Sitting on the bed, appears to be in distress due to itching of both lower extremity     HENT:      Head: Normocephalic and atraumatic. Eyes:      General: No scleral icterus. Conjunctiva/sclera: Conjunctivae normal.   Neck:      Musculoskeletal: Normal range of motion and neck supple. Vascular: No JVD. Cardiovascular:      Rate and Rhythm: Normal rate and regular rhythm. Heart sounds: Normal heart sounds. Pulmonary:      Effort: Pulmonary effort is normal.      Breath sounds: Normal breath sounds. Abdominal:      Palpations: Abdomen is soft. There is no mass. Tenderness: There is no abdominal tenderness. Musculoskeletal: Normal range of motion. necrotic skin both LE with surrounding erythema, superficial ulcers extending from few cm below the knee to ankle, mottled appearance both feet with blackish debris between the toes, able to palpate dorsalis pedis pulsation bilaterally  Lymphadenopathy:      Cervical: No cervical adenopathy. Skin:     General: Skin is warm and dry.    Neurological:      Mental Status: He is alert.            Labs: Results:   Chemistry Recent Labs     05/06/21  0450 05/05/21 2010   GLU 81 168*   * 119*   K 4.3 4.0   CL 93* 84*   CO2 22 25   BUN 23* 24*   CREA 1.10 1.39*   CA 7.4* 8.8   AGAP 9 10   BUCR 21* 17   AP  --  117   TP  --  7.7   ALB  --  2.5*   GLOB  --  5.2*   AGRAT  --  0.5*      CBC w/Diff Recent Labs     05/05/21 2010   WBC 18.6*   RBC 4.49   HGB 13.7   HCT 38.3      GRANS 88*   LYMPH 3*   EOS 1      Microbiology Recent Labs     05/05/21 2011 05/05/21 2010   CULT NO GROWTH AFTER 9 HOURS NO GROWTH AFTER 9 HOURS          RADIOLOGY:    All available imaging studies/reports in [facility] care for this admission were reviewed      Disclaimer: Sections of this note are dictated utilizing voice recognition software, which may have resulted in some phonetic based errors in grammar and contents. Even though attempts were made to correct all the mistakes, some may have been missed, and remained in the body of the document. If questions arise, please contact our department.     Dr. Shantanu Benitez, Infectious Disease Specialist  [phone]  May 6, 2021  9:27 AM

## 2021-05-06 NOTE — WOUND CARE
Physical Exam   Room 467: pt off the unit to vascular lab.   Marylee Barrack BSN, RN, [facility], [address]

## 2021-05-06 NOTE — PROGRESS NOTES
Problem: Falls - Risk of  Goal: *Absence of Falls  Description: Document Kami Ruts Fall Risk and appropriate interventions in the flowsheet. Outcome: Progressing Towards Goal  Note: Fall Risk Interventions:            Medication Interventions: Patient to call before getting OOB                   Problem: Patient Education: Go to Patient Education Activity  Goal: Patient/Family Education  Outcome: Progressing Towards Goal     Problem: Pressure Injury - Risk of  Goal: *Prevention of pressure injury  Description: Document Xavier Scale and appropriate interventions in the flowsheet. Outcome: Progressing Towards Goal  Note: Pressure Injury Interventions:       Moisture Interventions: Absorbent underpads    Activity Interventions: Pressure redistribution bed/mattress(bed type)    Mobility Interventions: HOB 30 degrees or less    Nutrition Interventions: Document food/fluid/supplement intake                     Problem: Patient Education: Go to Patient Education Activity  Goal: Patient/Family Education  Outcome: Progressing Towards Goal     Problem: Pain  Goal: *Control of Pain  Outcome: Progressing Towards Goal  Goal: *PALLIATIVE CARE:  Alleviation of Pain  Outcome: Progressing Towards Goal     Problem: Patient Education: Go to Patient Education Activity  Goal: Patient/Family Education  Outcome: Progressing Towards Goal     Problem: Impaired Skin Integrity/Pressure Injury Treatment  Goal: *Improvement of Existing Pressure Injury  Outcome: Progressing Towards Goal  Goal: *Prevention of pressure injury  Description: Document Xavier Scale and appropriate interventions in the flowsheet.   Outcome: Progressing Towards Goal  Note: Pressure Injury Interventions:       Moisture Interventions: Absorbent underpads    Activity Interventions: Pressure redistribution bed/mattress(bed type)    Mobility Interventions: HOB 30 degrees or less    Nutrition Interventions: Document food/fluid/supplement intake                     Problem: Patient Education: Go to Patient Education Activity  Goal: Patient/Family Education  Outcome: Progressing Towards Goal     Problem: Hypertension  Goal: *Blood pressure within specified parameters  Outcome: Progressing Towards Goal  Goal: *Fluid volume balance  Outcome: Progressing Towards Goal  Goal: *Labs within defined limits  Outcome: Progressing Towards Goal     Problem: Patient Education: Go to Patient Education Activity  Goal: Patient/Family Education  Outcome: Progressing Towards Goal

## 2021-05-06 NOTE — PROGRESS NOTES
TRANSFER - IN REPORT:    Verbal report received from Gordon velazquez RN(name) on Pilar Gay  being received from [facility] ER(unit) for routine progression of care      Report consisted of patients Situation, Background, Assessment and   Recommendations(SBAR). Information from the following report(s) SBAR, Kardex, ED Summary, Intake/Output and MAR was reviewed with the receiving nurse. Opportunity for questions and clarification was provided. Assessment completed upon patients arrival to unit and care assumed.

## 2021-05-06 NOTE — ROUTINE PROCESS
Bedside shift change report given to [facility] (oncoming nurse) by Delta Kuhn RN (offgoing nurse). Report included the following information SBAR, Kardex, Intake/Output and MAR.

## 2021-05-06 NOTE — CONSULTS
Comprehensive Nutrition Assessment    Type and Reason for Visit: Initial, Consult    Nutrition Recommendations/Plan:   - Add double portion meat once daily, vegetables BID  - continue all other nutrition interventions. Nutrition Assessment:  Pt reported good appetite/ meal intake PTA and since admission. Tolerating diet. Eating 100%. Noted significant wt loss since 4/20/21, but stable wt prior to that date per chart hx; rechecked wt today, updated in chart. Noted no significant changes in weight PTA. Has hx of alcohol use; pt reported being already familiar with affects and previously advised to limit/avoid alcohol consumption. Receiving MVI, thiamine, and folic acid supplement    Malnutrition Assessment:  Malnutrition Status:  No malnutrition      Nutrition History and Allergies: Past medical hx:  Alcohol use (\"a couple of beers daily\"). Good appetite/ meal intake PTA. Stable weight PTA per chart hx. No known food allergies     Estimated Daily Nutrient Needs:  Energy (kcal): 3602-1088; Weight Used for Energy Requirements: Ideal(78 kg)  Protein (g): ; Weight Used for Protein Requirements: Current(122.9 kg  x0.8-1)  Fluid (ml/day): 4827-4195; Method Used for Fluid Requirements: 1 ml/kcal      Nutrition Related Findings:  BM 5/5.   +edema. Wounds:    (none per chart documentation. noted skin peeling and scabs on legs)       Current Nutrition Therapies:  DIET REGULAR    Anthropometric Measures:  · Height:  5' 11\" (180.3 cm)  · Current Body Wt:  122.9 kg (271 lb)   · Admission Body Wt:  190 lb    · Usual Body Wt:  122.5 kg (270 lb)(per chart hx)     · Ideal Body Wt:  172 lbs:  157.6 %   · Adjusted Body Weight:   ; Weight Adjustment for: No adjustment   · BMI Category:  Obese class 2 (BMI 35.0-39. 9)       Nutrition Diagnosis:   · Inadequate energy intake related to (insufficient calorie diet) as evidenced by (current diet inadequate in meeting patient's estimated nutrition needs)        Nutrition Interventions:   Food and/or Nutrient Delivery: Modify current diet, Vitamin supplement(add additional portions)  Nutrition Education and Counseling: Education not indicated  Coordination of Nutrition Care: Continue to monitor while inpatient    Goals:  PO nutrition intake will meet >75% of patient's estimated nutrition needs within the next 14 days       Nutrition Monitoring and Evaluation:   Behavioral-Environmental Outcomes: None identified  Food/Nutrient Intake Outcomes: Food and nutrient intake, Vitamin/mineral intake  Physical Signs/Symptoms Outcomes: Meal time behavior, Nutrition focused physical findings, Fluid status or edema    Discharge Planning:     Too soon to determine     Electronically signed by Kal Ross RD on 5/6/2021 at 3:35 PM    Contact: [phone]

## 2021-05-06 NOTE — CONSULTS
Consult Note    Consult requested by: Sylvester Trinh MD    ADMIT DATE: 5/5/2021    CONSULT DATE: May 6, 2021           Admission diagnosis: lower ext cellulitis  Reason for Nephrology Consultation: hyponatremia      Assessment and plan:    #1 euvolemic asymptomatic hyponatremia, etiology appears to be a combination of beer portal andreia with poor solute intake. Cannot rule out underlying SIADH. I think nutrition with poor solute intake is a big factor. 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  Cell [phone]  Pager: [phone]    HPI:  Patient is a 50-year-old male with past medical history significant for obesity, extremity cellulitis, arthritis who was admitted to [facility] with increasing lower extremity swelling, erythema for last several weeks. Patient states that there is increasing redness, bilateral lower extremity pain with discoloration and discharge. He was diagnosed with acute gangrenous cellulitis of both legs, and ID has been consulted to help with management. He was noted to be have hyponatremia with sodium of 119 on admission. Patient was started on normal saline and sodium has improved to 125 this morning.   With correction of 6 mEq sodium chloride was stopped and his creatinine sodium appears to be stable. Patient does have a history of alcohol abuse and drinks couple of beers a day. He has poor nutritional intake. Denies any diarrhea nausea vomiting. Denies any fevers or chills. He does have chronic history of joint pains and swelling. Patient has had intermittent rash on his body. Nephrology was consulted and sent to me for his hyponatremia         History reviewed. No pertinent past medical history. Past Surgical History:   Procedure Laterality Date    HX ORTHOPAEDIC         Social History     Socioeconomic History    Marital status:      Spouse name: Not on file    Number of children: Not on file    Years of education: Not on file    Highest education level: Not on file   Occupational History    Not on file   Social Needs    Financial resource strain: Not on file    Food insecurity     Worry: Not on file     Inability: Not on file    Transportation needs     Medical: Not on file     Non-medical: Not on file   Tobacco Use    Smoking status: Former Smoker     Types: Cigarettes     Quit date: 3/26/1998     Years since quittin.1    Smokeless tobacco: Never Used    Tobacco comment: AROUND WIFE WHO SMOKES/2ND HAND SMOKE   Substance and Sexual Activity    Alcohol use:  Yes     Alcohol/week: 18.0 standard drinks     Types: 18 Cans of beer per week    Drug use: No    Sexual activity: Not on file   Lifestyle    Physical activity     Days per week: Not on file     Minutes per session: Not on file    Stress: Not on file   Relationships    Social connections     Talks on phone: Not on file     Gets together: Not on file     Attends Orthodox service: Not on file     Active member of club or organization: Not on file     Attends meetings of clubs or organizations: Not on file     Relationship status: Not on file    Intimate partner violence     Fear of current or ex partner: Not on file     Emotionally abused: Not on file     Physically abused: Not on file     Forced sexual activity: Not on file   Other Topics Concern    Not on file   Social History Narrative    Not on file       Family History   Problem Relation Age of Onset    Diabetes Mother     No Known Problems Father     Diabetes Maternal Grandmother      No Known Allergies     Home Medications:     Medications Prior to Admission   Medication Sig    ibuprofen (MOTRIN IB) 200 mg tablet Take  by mouth.  meloxicam (MOBIC) 15 mg tablet Take 1 Tab by mouth daily (with breakfast).  naproxen (NAPROSYN) 375 mg tablet Take 1 Tab by mouth two (2) times daily (with meals).  traMADol (ULTRAM) 50 mg tablet Take 1 Tab by mouth every six (6) hours as needed for Pain. Max Daily Amount: 200 mg.        Current Inpatient Medications:     Current Facility-Administered Medications   Medication Dose Route Frequency    sodium chloride (NS) flush 5-40 mL  5-40 mL IntraVENous Q8H    sodium chloride (NS) flush 5-40 mL  5-40 mL IntraVENous PRN    acetaminophen (TYLENOL) tablet 650 mg  650 mg Oral Q6H PRN    Or    acetaminophen (TYLENOL) suppository 650 mg  650 mg Rectal Q6H PRN    polyethylene glycol (MIRALAX) packet 17 g  17 g Oral DAILY PRN    promethazine (PHENERGAN) tablet 12.5 mg  12.5 mg Oral Q6H PRN    Or    ondansetron (ZOFRAN) injection 4 mg  4 mg IntraVENous Q6H PRN    enoxaparin (LOVENOX) injection 40 mg  40 mg SubCUTAneous DAILY    diphenhydrAMINE (BENADRYL) capsule 50 mg  50 mg Oral Q6H PRN    hydrALAZINE (APRESOLINE) 20 mg/mL injection 10 mg  10 mg IntraVENous Q6H PRN    sodium chloride (NS) flush 5-40 mL  5-40 mL IntraVENous Q8H    sodium chloride (NS) flush 5-40 mL  5-40 mL IntraVENous PRN    LORazepam (ATIVAN) tablet 1 mg  1 mg Oral Q1H PRN    Or    LORazepam (ATIVAN) injection 1 mg  1 mg IntraVENous Q1H PRN    LORazepam (ATIVAN) tablet 2 mg  2 mg Oral Q1H PRN    Or    LORazepam (ATIVAN) injection 2 mg  2 mg IntraVENous Q1H PRN    LORazepam (ATIVAN) injection 3 mg  3 mg IntraVENous Q15MIN PRN    thiamine HCL (B-1) tablet 100 mg  100 mg Oral DAILY    folic acid (FOLVITE) tablet 1 mg  1 mg Oral DAILY    therapeutic multivitamin (THERAGRAN) tablet 1 Tab  1 Tab Oral DAILY    vancomycin (VANCOCIN) 1,000 mg in 0.9% sodium chloride 250 mL (VIAL-MATE)  1,000 mg IntraVENous Q12H    cefTRIAXone (ROCEPHIN) 2 g in sterile water (preservative free) 20 mL IV syringe  2 g IntraVENous Q24H       Review of Systems:   No fever or chills. No sore throat. No cough or hemoptysis. No shortness of breath or chest pain. No orthopnea or paroxysmal nocturnal dyspnea. Good appetite. No nausea, vomiting, abdominal pain, melena or hematochezia. No dysuria, no gross hematuria of voiding difficulties. No ankle swelling, no joint paints. No muscle aches. No skin changes. Physical Assessment:     Vitals:    05/06/21 1103 05/06/21 1514 05/06/21 1538 05/06/21 1543   BP: (!) 190/78 (!) 141/85     Pulse: (!) 103 99     Resp: 18 18     Temp: 99.1 °F (37.3 °C) 98.1 °F (36.7 °C)     SpO2: 96% 97%     Weight:   122.9 kg (271 lb)    Height:    5' 11\" (1.803 m)     Last 3 Recorded Weights in this Encounter    05/05/21 1939 05/06/21 0921 05/06/21 1538   Weight: 86.2 kg (190 lb) 86.2 kg (190 lb) 122.9 kg (271 lb)     Admission weight: Weight: 86.2 kg (190 lb) (05/05/21 1939)      Intake/Output Summary (Last 24 hours) at 5/6/2021 1717  Last data filed at 5/6/2021 1252  Gross per 24 hour   Intake 3160 ml   Output 900 ml   Net 2260 ml     Patient is in no apparent distress. HEENT: Head is normocephalic and atraumatic. Neck: no cervical lymphadenopathy or thyromegaly. Lungs: good air entry, clear to auscultation bilaterally. Cardiovascular system: S1, S2, regular rate and rhythm. Abdomen: soft, non tender, non distended.    Extremities:meche lower ext erythema + , swelling , tenderness,  dark areas, discharge    Data Review:    Labs: Results:       Chemistry Recent Labs     05/06/21  1444 05/06/21  0855 05/06/21  0450 05/05/21 2010   * 125* 81 168*   * 125* 124* 119*   K 4.1 4.1 4.3 4.0   CL 94* 94* 93* 84*   CO2 22 21 22 25   BUN 22* 22* 23* 24*   CREA 1.06 1.06 1.10 1.39*   CA 7.7* 7.2* 7.4* 8.8   AGAP 8 10 9 10   BUCR 21* 21* 21* 17   AP  --   --   --  117   TP  --   --   --  7.7   ALB  --   --   --  2.5*   GLOB  --   --   --  5.2*   AGRAT  --   --   --  0.5*         CBC w/Diff Recent Labs     05/05/21 2010   WBC 18.6*   RBC 4.49   HGB 13.7   HCT 38.3      GRANS 88*   LYMPH 3*   EOS 1         Iron/Ferritin No results for input(s): IRON in the last 72 hours. No lab exists for component: TIBCCALC   PTH/VIT D No results for input(s): PTH in the last 72 hours.     No lab exists for component: VITD           Steffanie Mccallum MD  5/6/2021  5:17 PM      May 6, 2021

## 2021-05-06 NOTE — PROGRESS NOTES
Patient admitted by my colleague this morning. I saw patient in follow-up. ID consulted. ID recommends surgical consult for skin biopsy. Discussed with surgeon. Hyponatremia noted. Nephrology consulted. Discussed with patient.

## 2021-05-06 NOTE — ROUTINE PROCESS
Bedside shift change report given to [facility], RN (oncoming nurse) by Vito Hickman RN (offgoing nurse). Report included the following information SBAR, Kardex, Intake/Output and MAR.

## 2021-05-06 NOTE — ED NOTES
Patient lying on stretcher- assisted to position of comfort- bilateral lower extremities weeping- chux pads placed beneath. Patient in low/locked position. Patient disheveled- wearing paper scrub top given to him 4/21/2021. Patient states that he did not follow up with MD following 4/21/2021 ED visit.

## 2021-05-06 NOTE — H&P
Hospitalist Admission Note    NAME: Verona Barahona   :  1960   MRN:  [id number]     Date:  2021     Patient PCP: Kashif Rudd MD  ________________________________________________________________________    My assessment of this patient's clinical condition and my plan of care is as follows. Assessment / Plan:  1. Bilateral lower extremity cellulitis  2. Low-grade fever with leukocytosis  3. Severe hyponatremia  4. Hyperglycemia  5. Hypoalbuminemia   6. OA  7. Chronic alcohol use  8. Tobacco use history     1. Admit to medical bed. 2. Continue IV antibiotic therapy as ordered. ID evaluation in AM.  3. Etiology of hyponatremia uncertain though concern for heart failure and beer potomania. Patient states that he only drinks \"a couple of beers\" a day and that his last drink was sometime yesterday. We will hydrate gently with normal saline and obtain Q6 hour BMPs to trend. Nephrology evaluation in AM.  4. Symptom driven alcohol withdrawal protocol although no evidence for acute withdrawal symptoms currently. MVI, folic acid and thiamine also ordered. 5. Check hemoglobin A1c and thyroid studies. 6. 2D echo to assess cardiac function. 7. Obtain lipid panel. 8. Wound/ostomy care services evaluation. 9. Clinical nutrition evaluation. 10. Further plan pending response to current treatment/overall clinical course. Code Status: Full  DVT Prophylaxis: Lovenox          Subjective:   CHIEF COMPLAINT: Lower extremity redness     HISTORY OF PRESENT ILLNESS:     Ame Villareal is a 62 y.o.  male who presented to [facility] emergency department yesterday evening for evaluation of fever and lower extremity redness. He was seen at [facility] on on 2021 for similar symptoms and was discharged home with oral antibiotic therapy with recommendation for outpatient follow-up. Patient has seen Dr. Sana Reyes in the remote past but has had no follow up in his office recently.   It appears the patient was set up to be seen by [address] [place] [address] but states that he has not had any follow-up since being discharged from the emergency department in the early morning hours of 2020. On arrival to [facility] yesterday evening, patient was noted to have significant erythema of both lower extremities involving the mid calf down to ankles/feet. WBCs were noted to be elevated and patient has been tachycardic since arrival to [facility]. He has been referred for hospital admission here at [facility] for further evaluation and treatment. PMHx:  Osteoarthritis      Surgical history:  Remote history of orthopedic procedure      Social History     Tobacco Use    Smoking status: Former Smoker     Types: Cigarettes     Quit date: 3/26/1998     Years since quittin.1    Smokeless tobacco: Never Used    Tobacco comment: AROUND WIFE WHO SMOKES/2ND HAND SMOKE   Substance Use Topics    Alcohol use: Yes     Alcohol/week: 18.0 standard drinks     Types: 18 Cans of beer per week        Family History   Problem Relation Age of Onset    Diabetes Mother     No Known Problems Father     Diabetes Maternal Grandmother      Medications:  No prescription medications PTA.     Allergies:  NKDA    REVIEW OF SYSTEMS:     Total of 12 systems reviewed as follows:       POSITIVE= bolded text  Negative = text not underlined  General:  fever, chills, sweats, generalized weakness, weight loss/gain,      loss of appetite, malaise  Eyes:    blurred vision, eye pain, loss of vision, double vision  ENT:    rhinorrhea, pharyngitis   Respiratory:   cough, sputum production, SOB, SPARKS, wheezing, pleuritic pain   Cardiology:   chest pain, palpitations, orthopnea, PND, edema, syncope   Gastrointestinal:  abdominal pain , N/V, diarrhea, dysphagia, constipation, bleeding   Genitourinary:  frequency, urgency, dysuria, hematuria, incontinence   Muskuloskeletal :  arthralgia, myalgia, back pain  Hematology:  easy bruising, nose or gum bleeding, lymphadenopathy   Dermatological: rash, ulceration, pruritis, color change / jaundice  Endocrine:   hot flashes or polydipsia   Neurological:  headache, dizziness, confusion, focal weakness, paresthesia,     Speech difficulties, memory loss, gait difficulty  Psychological: Feelings of anxiety, depression, agitation    Objective:   VITALS:    Visit Vitals  BP (!) 161/91   Pulse (!) 112   Temp 97.9 °F (36.6 °C)   Resp 19   Ht 5' 11\" (1.803 m)   Wt 86.2 kg (190 lb)   SpO2 90%   BMI 26.50 kg/m²       PHYSICAL EXAM:    General:    In NAD. Nontoxic-appearing. HEENT: Head NCAT. Pupils equal round sclerae anicteric, EOMI. Neck:  Supple, trachea midline. Lungs:   Clear, no wheezes. Effort nonlabored, no accessory muscle use. Chest wall:  No chest wall tenderness. Chest expansion equal and symmetrical bilaterally. Heart:   Regular, mildly tachycardic. Abdomen:   Soft, NT/ND. Bowel sounds normoactive. Extremities: Warm, bilateral weeping edema. No ischemia. Skin:     Bilateral lower extremity erythema from mid calf to feet. Psych:  Mood normal.  Calm, no agitation. Neurologic: Awake and alert, moves extremities spontaneously. _______________________________________________________________________  Care Plan discussed with:    Comments   Patient X    Family      RN X    Care Manager                    Consultant:      _______________________________________________________________________  Expected  Disposition:   Home     HH/PT/OT/RN X   SNF/LTC    ARU    ________________________________________________________________________    Tests/Procedures:   CXR:  IMPRESSION  1. No acute infiltrate or effusion.         LAB DATA REVIEWED:    Recent Results (from the past 24 hour(s))   POC LACTIC ACID    Collection Time: 05/05/21  8:04 PM   Result Value Ref Range    Lactic Acid (POC) 1.84 0.40 - 2.00 mmol/L   CBC WITH AUTOMATED DIFF    Collection Time: 05/05/21  8:10 PM   Result Value Ref Range WBC 18.6 (H) 4.6 - 13.2 K/uL    RBC 4.49 4.35 - 5.65 M/uL    HGB 13.7 13.0 - 16.0 g/dL    HCT 38.3 36.0 - 48.0 %    MCV 85.3 74.0 - 97.0 FL    MCH 30.5 24.0 - 34.0 PG    MCHC 35.8 31.0 - 37.0 g/dL    RDW 12.0 11.6 - 14.5 %    PLATELET 407 633 - 034 K/uL    MPV 8.8 (L) 9.2 - 11.8 FL    NEUTROPHILS 88 (H) 40 - 73 %    BAND NEUTROPHILS 4 %    LYMPHOCYTES 3 (L) 21 - 52 %    MONOCYTES 4 3 - 10 %    EOSINOPHILS 1 0 - 5 %    BASOPHILS 0 0 - 2 %    ABS. NEUTROPHILS 17.1 (H) 1.8 - 8.0 K/UL    ABS. LYMPHOCYTES 0.6 (L) 0.9 - 3.6 K/UL    ABS. MONOCYTES 0.7 0.05 - 1.2 K/UL    ABS. EOSINOPHILS 0.2 0.0 - 0.4 K/UL    ABS. BASOPHILS 0.0 0.0 - 0.1 K/UL    DF MANUAL      PLATELET COMMENTS ADEQUATE PLATELETS      RBC COMMENTS NORMOCYTIC, NORMOCHROMIC     METABOLIC PANEL, COMPREHENSIVE    Collection Time: 05/05/21  8:10 PM   Result Value Ref Range    Sodium 119 (LL) 136 - 145 mmol/L    Potassium 4.0 3.5 - 5.5 mmol/L    Chloride 84 (L) 100 - 111 mmol/L    CO2 25 21 - 32 mmol/L    Anion gap 10 3.0 - 18 mmol/L    Glucose 168 (H) 74 - 99 mg/dL    BUN 24 (H) 7.0 - 18 MG/DL    Creatinine 1.39 (H) 0.6 - 1.3 MG/DL    BUN/Creatinine ratio 17 12 - 20      GFR est AA >60 >60 ml/min/1.73m2    GFR est non-AA 52 (L) >60 ml/min/1.73m2    Calcium 8.8 8.5 - 10.1 MG/DL    Bilirubin, total 0.6 0.2 - 1.0 MG/DL    ALT (SGPT) 31 16 - 61 U/L    AST (SGOT) 27 10 - 38 U/L    Alk.  phosphatase 117 45 - 117 U/L    Protein, total 7.7 6.4 - 8.2 g/dL    Albumin 2.5 (L) 3.4 - 5.0 g/dL    Globulin 5.2 (H) 2.0 - 4.0 g/dL    A-G Ratio 0.5 (L) 0.8 - 1.7     CARDIAC PANEL,(CK, CKMB & TROPONIN)    Collection Time: 05/05/21  8:10 PM   Result Value Ref Range    CK - MB 2.0 <3.6 ng/ml    CK-MB Index 2.4 0.0 - 4.0 %    CK 82 39 - 308 U/L    Troponin-I, QT <0.02 0.0 - 0.045 NG/ML   EKG, 12 LEAD, INITIAL    Collection Time: 05/05/21  8:23 PM   Result Value Ref Range    Ventricular Rate 111 BPM    Atrial Rate 111 BPM    P-R Interval 170 ms    QRS Duration 80 ms    Q-T Interval 306 ms    QTC Calculation (Bezet) 416 ms    Calculated P Axis 49 degrees    Calculated R Axis 69 degrees    Calculated T Axis 84 degrees    Diagnosis       Sinus tachycardia  Otherwise normal ECG  No previous ECGs available         Carla Luis MD  [address] [place] [address]      Disclaimer: Sections of this note are dictated utilizing voice recognition software and minor typographical errors may be present. If questions arise, please do not hesitate to contact me or call our department.

## 2021-05-07 ENCOUNTER — ANESTHESIA (OUTPATIENT)
Dept: SURGERY | Age: 61
DRG: 872 | End: 2021-05-07
Payer: COMMERCIAL

## 2021-05-07 LAB
ANION GAP SERPL CALC-SCNC: 9 MMOL/L (ref 3–18)
BASOPHILS # BLD: 0 K/UL (ref 0–0.1)
BASOPHILS NFR BLD: 0 % (ref 0–2)
BUN SERPL-MCNC: 18 MG/DL (ref 7–18)
BUN/CREAT SERPL: 18 (ref 12–20)
CALCIUM SERPL-MCNC: 7.8 MG/DL (ref 8.5–10.1)
CHLORIDE SERPL-SCNC: 94 MMOL/L (ref 100–111)
CHOLEST SERPL-MCNC: 96 MG/DL
CO2 SERPL-SCNC: 22 MMOL/L (ref 21–32)
COVID-19 RAPID TEST, COVR: ABNORMAL
COVID-19 RAPID TEST, COVR: NOT DETECTED
CREAT SERPL-MCNC: 1 MG/DL (ref 0.6–1.3)
DIFFERENTIAL METHOD BLD: ABNORMAL
EOSINOPHIL # BLD: 0 K/UL (ref 0–0.4)
EOSINOPHIL NFR BLD: 0 % (ref 0–5)
ERYTHROCYTE [DISTWIDTH] IN BLOOD BY AUTOMATED COUNT: 12.6 % (ref 11.6–14.5)
ERYTHROCYTE [SEDIMENTATION RATE] IN BLOOD: 119 MM/HR (ref 0–20)
GLUCOSE SERPL-MCNC: 83 MG/DL (ref 74–99)
HBA1C MFR BLD: 5.4 % (ref 4.2–5.6)
HCT VFR BLD AUTO: 34.2 % (ref 36–48)
HDLC SERPL-MCNC: 36 MG/DL (ref 40–60)
HDLC SERPL: 2.7 {RATIO} (ref 0–5)
HGB BLD-MCNC: 12.1 G/DL (ref 13–16)
INR PPP: 1.2 (ref 0.8–1.2)
LDLC SERPL CALC-MCNC: 49 MG/DL (ref 0–100)
LIPID PROFILE,FLP: ABNORMAL
LYMPHOCYTES # BLD: 0.9 K/UL (ref 0.9–3.6)
LYMPHOCYTES NFR BLD: 5 % (ref 21–52)
MAGNESIUM SERPL-MCNC: 1.9 MG/DL (ref 1.6–2.6)
MCH RBC QN AUTO: 31.1 PG (ref 24–34)
MCHC RBC AUTO-ENTMCNC: 35.4 G/DL (ref 31–37)
MCV RBC AUTO: 87.9 FL (ref 74–97)
MONOCYTES # BLD: 1.4 K/UL (ref 0.05–1.2)
MONOCYTES NFR BLD: 8 % (ref 3–10)
NEUTS SEG # BLD: 15.6 K/UL (ref 1.8–8)
NEUTS SEG NFR BLD: 87 % (ref 40–73)
PLATELET # BLD AUTO: 343 K/UL (ref 135–420)
PLATELET COMMENTS,PCOM: ABNORMAL
PMV BLD AUTO: 8.9 FL (ref 9.2–11.8)
POTASSIUM SERPL-SCNC: 4 MMOL/L (ref 3.5–5.5)
PROTHROMBIN TIME: 15.2 SEC (ref 11.5–15.2)
RBC # BLD AUTO: 3.89 M/UL (ref 4.35–5.65)
RBC MORPH BLD: ABNORMAL
SODIUM SERPL-SCNC: 125 MMOL/L (ref 136–145)
SOURCE, COVRS: ABNORMAL
SOURCE, COVRS: NORMAL
TRIGL SERPL-MCNC: 55 MG/DL (ref ?–150)
TSH SERPL DL<=0.05 MIU/L-ACNC: 1.28 UIU/ML (ref 0.36–3.74)
VLDLC SERPL CALC-MCNC: 11 MG/DL
WBC # BLD AUTO: 17.9 K/UL (ref 4.6–13.2)

## 2021-05-07 PROCEDURE — 80061 LIPID PANEL: CPT

## 2021-05-07 PROCEDURE — 86431 RHEUMATOID FACTOR QUANT: CPT

## 2021-05-07 PROCEDURE — 36415 COLL VENOUS BLD VENIPUNCTURE: CPT

## 2021-05-07 PROCEDURE — 77030031139 HC SUT VCRL2 J&J -A: Performed by: SURGERY

## 2021-05-07 PROCEDURE — 84443 ASSAY THYROID STIM HORMONE: CPT

## 2021-05-07 PROCEDURE — 87116 MYCOBACTERIA CULTURE: CPT

## 2021-05-07 PROCEDURE — 99232 SBSQ HOSP IP/OBS MODERATE 35: CPT | Performed by: EMERGENCY MEDICINE

## 2021-05-07 PROCEDURE — 74011250636 HC RX REV CODE- 250/636: Performed by: NURSE ANESTHETIST, CERTIFIED REGISTERED

## 2021-05-07 PROCEDURE — 99252 IP/OBS CONSLTJ NEW/EST SF 35: CPT | Performed by: SURGERY

## 2021-05-07 PROCEDURE — 87186 SC STD MICRODIL/AGAR DIL: CPT

## 2021-05-07 PROCEDURE — 00400 ANES INTEGUMENTARY SYS NOS: CPT | Performed by: NURSE ANESTHETIST, CERTIFIED REGISTERED

## 2021-05-07 PROCEDURE — 86160 COMPLEMENT ANTIGEN: CPT

## 2021-05-07 PROCEDURE — 88312 SPECIAL STAINS GROUP 1: CPT

## 2021-05-07 PROCEDURE — 74011250636 HC RX REV CODE- 250/636: Performed by: EMERGENCY MEDICINE

## 2021-05-07 PROCEDURE — 76010000138 HC OR TIME 0.5 TO 1 HR: Performed by: SURGERY

## 2021-05-07 PROCEDURE — 88305 TISSUE EXAM BY PATHOLOGIST: CPT

## 2021-05-07 PROCEDURE — 85025 COMPLETE CBC W/AUTO DIFF WBC: CPT

## 2021-05-07 PROCEDURE — 0JBN0ZX EXCISION OF RIGHT LOWER LEG SUBCUTANEOUS TISSUE AND FASCIA, OPEN APPROACH, DIAGNOSTIC: ICD-10-PCS | Performed by: SURGERY

## 2021-05-07 PROCEDURE — 83036 HEMOGLOBIN GLYCOSYLATED A1C: CPT

## 2021-05-07 PROCEDURE — 87077 CULTURE AEROBIC IDENTIFY: CPT

## 2021-05-07 PROCEDURE — 87070 CULTURE OTHR SPECIMN AEROBIC: CPT

## 2021-05-07 PROCEDURE — 76210000006 HC OR PH I REC 0.5 TO 1 HR: Performed by: SURGERY

## 2021-05-07 PROCEDURE — 87102 FUNGUS ISOLATION CULTURE: CPT

## 2021-05-07 PROCEDURE — 74011000250 HC RX REV CODE- 250: Performed by: EMERGENCY MEDICINE

## 2021-05-07 PROCEDURE — 76060000032 HC ANESTHESIA 0.5 TO 1 HR: Performed by: SURGERY

## 2021-05-07 PROCEDURE — 74011000250 HC RX REV CODE- 250: Performed by: NURSE ANESTHETIST, CERTIFIED REGISTERED

## 2021-05-07 PROCEDURE — 74011250636 HC RX REV CODE- 250/636: Performed by: SURGERY

## 2021-05-07 PROCEDURE — 74011000258 HC RX REV CODE- 258: Performed by: NURSE ANESTHETIST, CERTIFIED REGISTERED

## 2021-05-07 PROCEDURE — 85610 PROTHROMBIN TIME: CPT

## 2021-05-07 PROCEDURE — 74011250636 HC RX REV CODE- 250/636: Performed by: INTERNAL MEDICINE

## 2021-05-07 PROCEDURE — 65270000029 HC RM PRIVATE

## 2021-05-07 PROCEDURE — 83735 ASSAY OF MAGNESIUM: CPT

## 2021-05-07 PROCEDURE — 77030008556 HC TBNG SMK EVAC COVD -A: Performed by: SURGERY

## 2021-05-07 PROCEDURE — 74011000250 HC RX REV CODE- 250: Performed by: SURGERY

## 2021-05-07 PROCEDURE — 2709999900 HC NON-CHARGEABLE SUPPLY: Performed by: SURGERY

## 2021-05-07 PROCEDURE — 87340 HEPATITIS B SURFACE AG IA: CPT

## 2021-05-07 PROCEDURE — 87635 SARS-COV-2 COVID-19 AMP PRB: CPT

## 2021-05-07 PROCEDURE — 86141 C-REACTIVE PROTEIN HS: CPT

## 2021-05-07 PROCEDURE — 77010033678 HC OXYGEN DAILY

## 2021-05-07 PROCEDURE — 74011250637 HC RX REV CODE- 250/637: Performed by: FAMILY MEDICINE

## 2021-05-07 PROCEDURE — 74011250637 HC RX REV CODE- 250/637: Performed by: NURSE ANESTHETIST, CERTIFIED REGISTERED

## 2021-05-07 PROCEDURE — 85652 RBC SED RATE AUTOMATED: CPT

## 2021-05-07 PROCEDURE — 2709999900 HC NON-CHARGEABLE SUPPLY

## 2021-05-07 PROCEDURE — 86803 HEPATITIS C AB TEST: CPT

## 2021-05-07 PROCEDURE — 77030002933 HC SUT MCRYL J&J -A: Performed by: SURGERY

## 2021-05-07 PROCEDURE — 80048 BASIC METABOLIC PNL TOTAL CA: CPT

## 2021-05-07 PROCEDURE — 00400 ANES INTEGUMENTARY SYS NOS: CPT | Performed by: ANESTHESIOLOGY

## 2021-05-07 PROCEDURE — 74011250636 HC RX REV CODE- 250/636: Performed by: FAMILY MEDICINE

## 2021-05-07 PROCEDURE — 86038 ANTINUCLEAR ANTIBODIES: CPT

## 2021-05-07 RX ORDER — FAMOTIDINE 20 MG/1
20 TABLET, FILM COATED ORAL ONCE
Status: COMPLETED | OUTPATIENT
Start: 2021-05-07 | End: 2021-05-07

## 2021-05-07 RX ORDER — MIDAZOLAM HYDROCHLORIDE 1 MG/ML
INJECTION, SOLUTION INTRAMUSCULAR; INTRAVENOUS AS NEEDED
Status: DISCONTINUED | OUTPATIENT
Start: 2021-05-07 | End: 2021-05-07 | Stop reason: HOSPADM

## 2021-05-07 RX ORDER — VANCOMYCIN/0.9 % SOD CHLORIDE 1.5G/250ML
1500 PLASTIC BAG, INJECTION (ML) INTRAVENOUS EVERY 12 HOURS
Status: DISCONTINUED | OUTPATIENT
Start: 2021-05-07 | End: 2021-05-10 | Stop reason: ALTCHOICE

## 2021-05-07 RX ORDER — SODIUM CHLORIDE, SODIUM LACTATE, POTASSIUM CHLORIDE, CALCIUM CHLORIDE 600; 310; 30; 20 MG/100ML; MG/100ML; MG/100ML; MG/100ML
INJECTION, SOLUTION INTRAVENOUS
Status: DISCONTINUED | OUTPATIENT
Start: 2021-05-07 | End: 2021-05-07 | Stop reason: HOSPADM

## 2021-05-07 RX ORDER — LIDOCAINE HYDROCHLORIDE 10 MG/ML
0.1 INJECTION, SOLUTION EPIDURAL; INFILTRATION; INTRACAUDAL; PERINEURAL AS NEEDED
Status: DISCONTINUED | OUTPATIENT
Start: 2021-05-07 | End: 2021-05-07 | Stop reason: HOSPADM

## 2021-05-07 RX ORDER — HYDRALAZINE HYDROCHLORIDE 20 MG/ML
10 INJECTION INTRAMUSCULAR; INTRAVENOUS ONCE
Status: COMPLETED | OUTPATIENT
Start: 2021-05-07 | End: 2021-05-07

## 2021-05-07 RX ORDER — NALOXONE HYDROCHLORIDE 0.4 MG/ML
0.2 INJECTION, SOLUTION INTRAMUSCULAR; INTRAVENOUS; SUBCUTANEOUS AS NEEDED
Status: DISCONTINUED | OUTPATIENT
Start: 2021-05-07 | End: 2021-05-07 | Stop reason: HOSPADM

## 2021-05-07 RX ORDER — KETOROLAC TROMETHAMINE 15 MG/ML
15 INJECTION, SOLUTION INTRAMUSCULAR; INTRAVENOUS ONCE
Status: COMPLETED | OUTPATIENT
Start: 2021-05-07 | End: 2021-05-07

## 2021-05-07 RX ORDER — PROPOFOL 10 MG/ML
VIAL (ML) INTRAVENOUS
Status: DISCONTINUED | OUTPATIENT
Start: 2021-05-07 | End: 2021-05-07 | Stop reason: HOSPADM

## 2021-05-07 RX ORDER — HYDROMORPHONE HYDROCHLORIDE 1 MG/ML
0.5 INJECTION, SOLUTION INTRAMUSCULAR; INTRAVENOUS; SUBCUTANEOUS
Status: DISCONTINUED | OUTPATIENT
Start: 2021-05-07 | End: 2021-05-07 | Stop reason: HOSPADM

## 2021-05-07 RX ORDER — SODIUM CHLORIDE, SODIUM LACTATE, POTASSIUM CHLORIDE, CALCIUM CHLORIDE 600; 310; 30; 20 MG/100ML; MG/100ML; MG/100ML; MG/100ML
75 INJECTION, SOLUTION INTRAVENOUS CONTINUOUS
Status: DISCONTINUED | OUTPATIENT
Start: 2021-05-07 | End: 2021-05-07 | Stop reason: HOSPADM

## 2021-05-07 RX ORDER — SODIUM CHLORIDE 0.9 % (FLUSH) 0.9 %
5-40 SYRINGE (ML) INJECTION AS NEEDED
Status: DISCONTINUED | OUTPATIENT
Start: 2021-05-07 | End: 2021-05-07 | Stop reason: HOSPADM

## 2021-05-07 RX ORDER — BACITRACIN ZINC 500 UNIT/G
OINTMENT (GRAM) TOPICAL AS NEEDED
Status: DISCONTINUED | OUTPATIENT
Start: 2021-05-07 | End: 2021-05-07 | Stop reason: HOSPADM

## 2021-05-07 RX ORDER — FENTANYL CITRATE 50 UG/ML
INJECTION, SOLUTION INTRAMUSCULAR; INTRAVENOUS AS NEEDED
Status: DISCONTINUED | OUTPATIENT
Start: 2021-05-07 | End: 2021-05-07 | Stop reason: HOSPADM

## 2021-05-07 RX ORDER — LIDOCAINE HYDROCHLORIDE 10 MG/ML
INJECTION, SOLUTION EPIDURAL; INFILTRATION; INTRACAUDAL; PERINEURAL AS NEEDED
Status: DISCONTINUED | OUTPATIENT
Start: 2021-05-07 | End: 2021-05-07 | Stop reason: HOSPADM

## 2021-05-07 RX ORDER — SODIUM CHLORIDE, SODIUM LACTATE, POTASSIUM CHLORIDE, CALCIUM CHLORIDE 600; 310; 30; 20 MG/100ML; MG/100ML; MG/100ML; MG/100ML
75 INJECTION, SOLUTION INTRAVENOUS CONTINUOUS
Status: DISCONTINUED | OUTPATIENT
Start: 2021-05-07 | End: 2021-05-07

## 2021-05-07 RX ORDER — SODIUM CHLORIDE 9 MG/ML
75 INJECTION, SOLUTION INTRAVENOUS CONTINUOUS
Status: DISCONTINUED | OUTPATIENT
Start: 2021-05-07 | End: 2021-05-08

## 2021-05-07 RX ORDER — SODIUM CHLORIDE 0.9 % (FLUSH) 0.9 %
5-40 SYRINGE (ML) INJECTION EVERY 8 HOURS
Status: DISCONTINUED | OUTPATIENT
Start: 2021-05-07 | End: 2021-05-07 | Stop reason: HOSPADM

## 2021-05-07 RX ADMIN — Medication 10 ML: at 05:59

## 2021-05-07 RX ADMIN — DEXMEDETOMIDINE HYDROCHLORIDE 10 MCG: 100 INJECTION, SOLUTION, CONCENTRATE INTRAVENOUS at 13:11

## 2021-05-07 RX ADMIN — KETOROLAC TROMETHAMINE 15 MG: 15 INJECTION, SOLUTION INTRAMUSCULAR; INTRAVENOUS at 23:55

## 2021-05-07 RX ADMIN — THERA TABS 1 TABLET: TAB at 11:45

## 2021-05-07 RX ADMIN — Medication 100 MG: at 11:45

## 2021-05-07 RX ADMIN — DEXMEDETOMIDINE HYDROCHLORIDE 10 MCG: 100 INJECTION, SOLUTION, CONCENTRATE INTRAVENOUS at 13:00

## 2021-05-07 RX ADMIN — ACETAMINOPHEN 650 MG: 650 SUPPOSITORY RECTAL at 05:58

## 2021-05-07 RX ADMIN — MIDAZOLAM HYDROCHLORIDE 2 MG: 2 INJECTION, SOLUTION INTRAMUSCULAR; INTRAVENOUS at 13:00

## 2021-05-07 RX ADMIN — SODIUM CHLORIDE, SODIUM LACTATE, POTASSIUM CHLORIDE, AND CALCIUM CHLORIDE: 600; 310; 30; 20 INJECTION, SOLUTION INTRAVENOUS at 13:00

## 2021-05-07 RX ADMIN — FAMOTIDINE 20 MG: 20 TABLET ORAL at 06:47

## 2021-05-07 RX ADMIN — VANCOMYCIN HYDROCHLORIDE 1500 MG: 10 INJECTION, POWDER, LYOPHILIZED, FOR SOLUTION INTRAVENOUS at 14:52

## 2021-05-07 RX ADMIN — WATER 2 G: 1 INJECTION INTRAMUSCULAR; INTRAVENOUS; SUBCUTANEOUS at 19:14

## 2021-05-07 RX ADMIN — PROPOFOL 50 MCG/KG/MIN: 10 INJECTION, EMULSION INTRAVENOUS at 13:11

## 2021-05-07 RX ADMIN — FENTANYL CITRATE 50 MCG: 50 INJECTION, SOLUTION INTRAMUSCULAR; INTRAVENOUS at 13:13

## 2021-05-07 RX ADMIN — HYDRALAZINE HYDROCHLORIDE 10 MG: 20 INJECTION INTRAMUSCULAR; INTRAVENOUS at 00:22

## 2021-05-07 RX ADMIN — FOLIC ACID 1 MG: 1 TABLET ORAL at 11:45

## 2021-05-07 RX ADMIN — SODIUM CHLORIDE 75 ML/HR: 900 INJECTION, SOLUTION INTRAVENOUS at 11:43

## 2021-05-07 RX ADMIN — SODIUM CHLORIDE, SODIUM LACTATE, POTASSIUM CHLORIDE, AND CALCIUM CHLORIDE 75 ML/HR: 600; 310; 30; 20 INJECTION, SOLUTION INTRAVENOUS at 06:48

## 2021-05-07 RX ADMIN — FENTANYL CITRATE 50 MCG: 50 INJECTION, SOLUTION INTRAMUSCULAR; INTRAVENOUS at 13:05

## 2021-05-07 NOTE — PROGRESS NOTES
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 Cannot rule out underlying SIADH. I think nutrition with poor solute intake is a big factor. 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 - respiratory - stable  - hemodynamics - stable, no pressrs  - UOP-ok  - Nutrition -poor    Objective:     Visit Vitals  BP (!) 140/83   Pulse 89   Temp 98.6 °F (37 °C)   Resp 13   Ht 5' 11\" (1.803 m)   Wt 122.9 kg (271 lb)   SpO2 100%   BMI 37.80 kg/m²         Intake/Output Summary (Last 24 hours) at 5/7/2021 1545  Last data filed at 5/6/2021 2240  Gross per 24 hour   Intake 240 ml   Output 600 ml   Net -360 ml       Physical Exam:     Patient is in no apparent distress. HEENT: Head is normocephalic and atraumatic. Neck: no cervical lymphadenopathy or thyromegaly. Lungs: good air entry, clear to auscultation bilaterally. Cardiovascular system: S1, S2, regular rate and rhythm. Abdomen: soft, non tender, non distended.    Extremities:meche lower ext erythema + , swelling , tenderness,  dark areas, discharge      Data Review:    Recent Labs 05/07/21 0306   WBC 17.9*   RBC 3.89*   HCT 34.2*   MCV 87.9   MCH 31.1   MCHC 35.4   RDW 12.6     Recent Labs     05/07/21  0306 05/06/21  2030 05/06/21  1444 05/06/21  0855 05/06/21  0450 05/05/21 2010   BUN 18 20* 22* 22* 23* 24*   CREA 1.00 1.05 1.06 1.06 1.10 1.39*   CA 7.8* 8.1* 7.7* 7.2* 7.4* 8.8   ALB  --   --   --   --   --  2.5*   K 4.0 4.6 4.1 4.1 4.3 4.0   * 126* 124* 125* 124* 119*   CL 94* 94* 94* 94* 93* 84*   CO2 22 25 22 21 22 25   GLU 83 94 108* 125* 81 168*       Mj Purdy MD

## 2021-05-07 NOTE — ANESTHESIA POSTPROCEDURE EVALUATION
Procedure(s):  SKIN BIOPSY OF RIGHT LOWER EXTREMITY. MAC    Anesthesia Post Evaluation      Multimodal analgesia: multimodal analgesia used between 6 hours prior to anesthesia start to PACU discharge  Patient location during evaluation: bedside  Patient participation: complete - patient participated  Level of consciousness: awake  Pain management: adequate  Airway patency: patent  Anesthetic complications: no  Cardiovascular status: stable  Respiratory status: acceptable  Hydration status: acceptable  Post anesthesia nausea and vomiting:  controlled      INITIAL Post-op Vital signs:   Vitals Value Taken Time   /83 05/07/21 1356   Temp 37 °C (98.6 °F) 05/07/21 1356   Pulse 93 05/07/21 1408   Resp 16 05/07/21 1408   SpO2 100 % 05/07/21 1408   Vitals shown include unvalidated device data.

## 2021-05-07 NOTE — CONSULTS
General Surgery Consult    Lamar Caldwell  Admit date: 2021    MRN: [id number]     : 1960     Age: 62 y.o. Attending Physician: Anand Avelar MD, INDIRA      History of Present Illness:      Lamar Caldwell is a 62 y.o. male who is presenting with a complex medical history and I was asked by the medicine team to perform skin biopsies of his lower extremities for diagnosis and treatment. The patient has been developing cellulitis of the lower extremities for months according to him however the edema and the swelling has worsened and as well as skin necrosis that has developed recently. The patient is currently being treated with antibiotics but because his resistance and because of suspicion of other pathology I was asked to please do a skin biopsies of the lower extremity. I was specifically asked to do 3 biopsies total which I will do today. Patient Active Problem List    Diagnosis Date Noted    Sepsis ([facility] [address] 2021     History reviewed. No pertinent past medical history. Past Surgical History:   Procedure Laterality Date    HX ORTHOPAEDIC        Social History     Tobacco Use    Smoking status: Former Smoker     Types: Cigarettes     Quit date: 3/26/1998     Years since quittin.1    Smokeless tobacco: Never Used    Tobacco comment: AROUND WIFE WHO SMOKES/2ND HAND SMOKE   Substance Use Topics    Alcohol use:  Yes     Alcohol/week: 18.0 standard drinks     Types: 18 Cans of beer per week      Social History     Tobacco Use   Smoking Status Former Smoker    Types: Cigarettes    Quit date: 3/26/1998    Years since quittin.1   Smokeless Tobacco Never Used   Tobacco Comment    AROUND WIFE WHO SMOKES/2ND HAND SMOKE     Family History   Problem Relation Age of Onset    Diabetes Mother     No Known Problems Father     Diabetes Maternal Grandmother       Current Facility-Administered Medications   Medication Dose Route Frequency    lidocaine (PF) (XYLOCAINE) 10 mg/mL (1 %) injection 0.1 mL  0.1 mL SubCUTAneous PRN    lactated Ringers infusion  75 mL/hr IntraVENous CONTINUOUS    sodium chloride (NS) flush 5-40 mL  5-40 mL IntraVENous Q8H    sodium chloride (NS) flush 5-40 mL  5-40 mL IntraVENous PRN    acetaminophen (TYLENOL) tablet 650 mg  650 mg Oral Q6H PRN    Or    acetaminophen (TYLENOL) suppository 650 mg  650 mg Rectal Q6H PRN    polyethylene glycol (MIRALAX) packet 17 g  17 g Oral DAILY PRN    promethazine (PHENERGAN) tablet 12.5 mg  12.5 mg Oral Q6H PRN    Or    ondansetron (ZOFRAN) injection 4 mg  4 mg IntraVENous Q6H PRN    enoxaparin (LOVENOX) injection 40 mg  40 mg SubCUTAneous DAILY    diphenhydrAMINE (BENADRYL) capsule 50 mg  50 mg Oral Q6H PRN    hydrALAZINE (APRESOLINE) 20 mg/mL injection 10 mg  10 mg IntraVENous Q6H PRN    sodium chloride (NS) flush 5-40 mL  5-40 mL IntraVENous Q8H    sodium chloride (NS) flush 5-40 mL  5-40 mL IntraVENous PRN    LORazepam (ATIVAN) tablet 1 mg  1 mg Oral Q1H PRN    Or    LORazepam (ATIVAN) injection 1 mg  1 mg IntraVENous Q1H PRN    LORazepam (ATIVAN) tablet 2 mg  2 mg Oral Q1H PRN    Or    LORazepam (ATIVAN) injection 2 mg  2 mg IntraVENous Q1H PRN    LORazepam (ATIVAN) injection 3 mg  3 mg IntraVENous Q15MIN PRN    thiamine HCL (B-1) tablet 100 mg  100 mg Oral DAILY    folic acid (FOLVITE) tablet 1 mg  1 mg Oral DAILY    therapeutic multivitamin (THERAGRAN) tablet 1 Tab  1 Tab Oral DAILY    vancomycin (VANCOCIN) 1,000 mg in 0.9% sodium chloride 250 mL (VIAL-MATE)  1,000 mg IntraVENous Q12H    cefTRIAXone (ROCEPHIN) 2 g in sterile water (preservative free) 20 mL IV syringe  2 g IntraVENous Q24H      No Known Allergies       Review of Systems:  Pertinent items are noted in the History of Present Illness.     Objective:     Visit Vitals  BP (!) 153/80 (BP 1 Location: Right upper arm, BP Patient Position: At rest)   Pulse (!) 105   Temp 100.2 °F (37.9 °C)   Resp 18   Ht 5' 11\" (1.803 m)   Wt 122.9 kg (271 lb) SpO2 96%   BMI 37.80 kg/m²       Physical Exam:      General:  in no apparent distress and alert                       Lower Extremities: There are multiple area of cellulitis and skin necrosis of the lower extremity starting from the upper leg all the way to the feet. There is also some serous drainage from some areas. Imaging and Lab Review:     CBC:   Lab Results   Component Value Date/Time    WBC 17.9 (H) 05/07/2021 03:06 AM    RBC 3.89 (L) 05/07/2021 03:06 AM    HGB 12.1 (L) 05/07/2021 03:06 AM    HCT 34.2 (L) 05/07/2021 03:06 AM    PLATELET 851 52/34/0298 03:06 AM     BMP:   Lab Results   Component Value Date/Time    Glucose 83 05/07/2021 03:06 AM    Sodium 125 (L) 05/07/2021 03:06 AM    Potassium 4.0 05/07/2021 03:06 AM    Chloride 94 (L) 05/07/2021 03:06 AM    CO2 22 05/07/2021 03:06 AM    BUN 18 05/07/2021 03:06 AM    Creatinine 1.00 05/07/2021 03:06 AM    Calcium 7.8 (L) 05/07/2021 03:06 AM     CMP:  Lab Results   Component Value Date/Time    Glucose 83 05/07/2021 03:06 AM    Sodium 125 (L) 05/07/2021 03:06 AM    Potassium 4.0 05/07/2021 03:06 AM    Chloride 94 (L) 05/07/2021 03:06 AM    CO2 22 05/07/2021 03:06 AM    BUN 18 05/07/2021 03:06 AM    Creatinine 1.00 05/07/2021 03:06 AM    Calcium 7.8 (L) 05/07/2021 03:06 AM    Anion gap 9 05/07/2021 03:06 AM    BUN/Creatinine ratio 18 05/07/2021 03:06 AM    Alk.  phosphatase 117 05/05/2021 08:10 PM    Protein, total 7.7 05/05/2021 08:10 PM    Albumin 2.5 (L) 05/05/2021 08:10 PM    Globulin 5.2 (H) 05/05/2021 08:10 PM    A-G Ratio 0.5 (L) 05/05/2021 08:10 PM       Recent Results (from the past 24 hour(s))   METABOLIC PANEL, BASIC    Collection Time: 05/06/21  8:55 AM   Result Value Ref Range    Sodium 125 (L) 136 - 145 mmol/L    Potassium 4.1 3.5 - 5.5 mmol/L    Chloride 94 (L) 100 - 111 mmol/L    CO2 21 21 - 32 mmol/L    Anion gap 10 3.0 - 18 mmol/L    Glucose 125 (H) 74 - 99 mg/dL    BUN 22 (H) 7.0 - 18 MG/DL    Creatinine 1.06 0.6 - 1.3 MG/DL BUN/Creatinine ratio 21 (H) 12 - 20      GFR est AA >60 >60 ml/min/1.73m2    GFR est non-AA >60 >60 ml/min/1.73m2    Calcium 7.2 (L) 8.5 - 10.1 MG/DL   VANCOMYCIN, RANDOM    Collection Time: 05/06/21  9:20 AM   Result Value Ref Range    Vancomycin, random 8.7 5.0 - 40.0 UG/ML   ECHO ADULT COMPLETE    Collection Time: 05/06/21  9:59 AM   Result Value Ref Range    IVSd 0.74 0.60 - 1.00 cm    LVIDd 4.56 4.20 - 5.90 cm    LVIDs 3.30 cm    LVOT d 2.45 cm    LVPWd 1.11 (A) 0.60 - 1.00 cm    LVOT Cardiac Output 7.88 liter/minute    LVOT Peak Gradient 4.59 mmHg    Left Ventricular Outflow Tract Mean Gradient 3.05 mmHg    LVOT SV 75.7 mL    LVOT Peak Velocity 106.66 cm/s    LVOT VTI 16.09 cm    Right Atrial Area 4C 11.84 cm2    MV A Gregg 72.28 cm/s    Mitral Valve E Wave Deceleration Time 101.81 ms    MV E Gregg 72.28 cm/s    LV E' Lateral Velocity 9.10 cm/s    Tapse 2.72 (A) 1.50 - 2.00 cm    Ao Root D 3.50 cm    MV E/A 1.00     LV Mass .8 88.0 - 224.0 g    LV Mass AL Index 68.3 49.0 - 115.0 g/m2    E/E' lateral 1.79    METABOLIC PANEL, BASIC    Collection Time: 05/06/21  2:44 PM   Result Value Ref Range    Sodium 124 (L) 136 - 145 mmol/L    Potassium 4.1 3.5 - 5.5 mmol/L    Chloride 94 (L) 100 - 111 mmol/L    CO2 22 21 - 32 mmol/L    Anion gap 8 3.0 - 18 mmol/L    Glucose 108 (H) 74 - 99 mg/dL    BUN 22 (H) 7.0 - 18 MG/DL    Creatinine 1.06 0.6 - 1.3 MG/DL    BUN/Creatinine ratio 21 (H) 12 - 20      GFR est AA >60 >60 ml/min/1.73m2    GFR est non-AA >60 >60 ml/min/1.73m2    Calcium 7.7 (L) 8.5 - 10.1 MG/DL   SODIUM, UR, RANDOM    Collection Time: 05/06/21  4:30 PM   Result Value Ref Range    Sodium,urine random 16 (L) 20 - 110 MMOL/L   URINALYSIS W/MICROSCOPIC    Collection Time: 05/06/21  4:30 PM   Result Value Ref Range    Color YELLOW      Appearance CLEAR      Specific gravity 1.015 1.005 - 1.030      pH (UA) 5.5 5.0 - 8.0      Protein 30 (A) NEG mg/dL    Glucose Negative NEG mg/dL    Ketone Negative NEG mg/dL Bilirubin Negative NEG      Blood TRACE (A) NEG      Urobilinogen 0.2 0.2 - 1.0 EU/dL    Nitrites Negative NEG      Leukocyte Esterase Negative NEG      WBC Negative 0 - 5 /hpf    RBC 1 to 3 0 - 5 /hpf    Epithelial cells Negative 0 - 5 /lpf    Bacteria FEW (A) NEG /hpf   METABOLIC PANEL, BASIC    Collection Time: 05/06/21  8:30 PM   Result Value Ref Range    Sodium 126 (L) 136 - 145 mmol/L    Potassium 4.6 3.5 - 5.5 mmol/L    Chloride 94 (L) 100 - 111 mmol/L    CO2 25 21 - 32 mmol/L    Anion gap 7 3.0 - 18 mmol/L    Glucose 94 74 - 99 mg/dL    BUN 20 (H) 7.0 - 18 MG/DL    Creatinine 1.05 0.6 - 1.3 MG/DL    BUN/Creatinine ratio 19 12 - 20      GFR est AA >60 >60 ml/min/1.73m2    GFR est non-AA >60 >60 ml/min/1.73m2    Calcium 8.1 (L) 8.5 - 27.5 MG/DL   METABOLIC PANEL, BASIC    Collection Time: 05/07/21  3:06 AM   Result Value Ref Range    Sodium 125 (L) 136 - 145 mmol/L    Potassium 4.0 3.5 - 5.5 mmol/L    Chloride 94 (L) 100 - 111 mmol/L    CO2 22 21 - 32 mmol/L    Anion gap 9 3.0 - 18 mmol/L    Glucose 83 74 - 99 mg/dL    BUN 18 7.0 - 18 MG/DL    Creatinine 1.00 0.6 - 1.3 MG/DL    BUN/Creatinine ratio 18 12 - 20      GFR est AA >60 >60 ml/min/1.73m2    GFR est non-AA >60 >60 ml/min/1.73m2    Calcium 7.8 (L) 8.5 - 10.1 MG/DL   CBC WITH AUTOMATED DIFF    Collection Time: 05/07/21  3:06 AM   Result Value Ref Range    WBC 17.9 (H) 4.6 - 13.2 K/uL    RBC 3.89 (L) 4.35 - 5.65 M/uL    HGB 12.1 (L) 13.0 - 16.0 g/dL    HCT 34.2 (L) 36.0 - 48.0 %    MCV 87.9 74.0 - 97.0 FL    MCH 31.1 24.0 - 34.0 PG    MCHC 35.4 31.0 - 37.0 g/dL    RDW 12.6 11.6 - 14.5 %    PLATELET 421 323 - 795 K/uL    MPV 8.9 (L) 9.2 - 11.8 FL    NEUTROPHILS 87 (H) 40 - 73 %    LYMPHOCYTES 5 (L) 21 - 52 %    MONOCYTES 8 3 - 10 %    EOSINOPHILS 0 0 - 5 %    BASOPHILS 0 0 - 2 %    ABS. NEUTROPHILS 15.6 (H) 1.8 - 8.0 K/UL    ABS. LYMPHOCYTES 0.9 0.9 - 3.6 K/UL    ABS. MONOCYTES 1.4 (H) 0.05 - 1.2 K/UL    ABS. EOSINOPHILS 0.0 0.0 - 0.4 K/UL    ABS. BASOPHILS 0.0 0.0 - 0.1 K/UL    DF AUTOMATED      PLATELET COMMENTS ADEQUATE PLATELETS      RBC COMMENTS NORMOCYTIC, NORMOCHROMIC     HEMOGLOBIN A1C W/O EAG    Collection Time: 05/07/21  3:06 AM   Result Value Ref Range    Hemoglobin A1c 5.4 4.2 - 5.6 %   TSH 3RD GENERATION    Collection Time: 05/07/21  3:06 AM   Result Value Ref Range    TSH 1.28 0.36 - 3.74 uIU/mL   LIPID PANEL    Collection Time: 05/07/21  3:06 AM   Result Value Ref Range    LIPID PROFILE          Cholesterol, total 96 <200 MG/DL    Triglyceride 55 <150 MG/DL    HDL Cholesterol 36 (L) 40 - 60 MG/DL    LDL, calculated 49 0 - 100 MG/DL    VLDL, calculated 11 MG/DL    CHOL/HDL Ratio 2.7 0 - 5.0     MAGNESIUM    Collection Time: 05/07/21  3:06 AM   Result Value Ref Range    Magnesium 1.9 1.6 - 2.6 mg/dL   PROTHROMBIN TIME + INR    Collection Time: 05/07/21  3:06 AM   Result Value Ref Range    Prothrombin time 15.2 11.5 - 15.2 sec    INR 1.2 0.8 - 1.2         images and reports reviewed    Assessment:   Shayan Elizondo is a 62 y.o. male who is presenting with infection of the lower extremities that is being treated by the medical team.  However the patient is not getting better and I was asked to perform skin biopsies from the lower extremities. I was specifically asked to do 3 skin biopsies from any site in the lower extremities whether the right or the left. It seems these biopsies are needed for management and diagnosis as well as adjustment of the treatment. I explained to the patient that we will do this under anesthesia today and I also explained to him that I would recommend vascular surgery consultation for further evaluation and management. I explained to him that my role will be only to do the biopsy for now and help the medicine team finding the diagnosis and adjusting the treatment accordingly.      Plan:     I scheduled the patient for a skin biopsy today  Keep n.p.o. for now please  Vascular consultation    Please call me if you have any questions (cell phone: [phone])     Signed By: Chio Cespedes MD     May 7, 2021

## 2021-05-07 NOTE — PROGRESS NOTES
Problem: Falls - Risk of  Goal: *Absence of Falls  Description: Document [facility] Fall Risk and appropriate interventions in the flowsheet. Outcome: Progressing Towards Goal  Note: Fall Risk Interventions:            Medication Interventions: Patient to call before getting OOB                   Problem: Patient Education: Go to Patient Education Activity  Goal: Patient/Family Education  Outcome: Progressing Towards Goal     Problem: Pressure Injury - Risk of  Goal: *Prevention of pressure injury  Description: Document Xavier Scale and appropriate interventions in the flowsheet. Outcome: Progressing Towards Goal  Note: Pressure Injury Interventions:       Moisture Interventions: Absorbent underpads    Activity Interventions: Increase time out of bed, Pressure redistribution bed/mattress(bed type)    Mobility Interventions: HOB 30 degrees or less    Nutrition Interventions: Document food/fluid/supplement intake                     Problem: Patient Education: Go to Patient Education Activity  Goal: Patient/Family Education  Outcome: Progressing Towards Goal     Problem: Pain  Goal: *Control of Pain  Outcome: Progressing Towards Goal  Goal: *PALLIATIVE CARE:  Alleviation of Pain  Outcome: Progressing Towards Goal     Problem: Impaired Skin Integrity/Pressure Injury Treatment  Goal: *Improvement of Existing Pressure Injury  Outcome: Progressing Towards Goal  Goal: *Prevention of pressure injury  Description: Document Xavier Scale and appropriate interventions in the flowsheet.   Outcome: Progressing Towards Goal  Note: Pressure Injury Interventions:       Moisture Interventions: Absorbent underpads    Activity Interventions: Increase time out of bed, Pressure redistribution bed/mattress(bed type)    Mobility Interventions: HOB 30 degrees or less    Nutrition Interventions: Document food/fluid/supplement intake

## 2021-05-07 NOTE — ROUTINE PROCESS
TRANSFER - IN REPORT:    Verbal report received from [facility] RN(name) on Jose Martinez  being received from [facility](unit) for ordered procedure      Report consisted of patients Situation, Background, Assessment and   Recommendations(SBAR). Information from the following report(s) SBAR and Kardex was reviewed with the receiving nurse. Opportunity for questions and clarification was provided. Assessment completed upon patients arrival to unit and care assumed.

## 2021-05-07 NOTE — PROGRESS NOTES
Patient with abnormal vital signs  at midnight with a MEWS of 5. Temp is 101.9, , and B/P 151/82. Patient is sleeping with no noted distress. Dr Tequila Nugent has been notified and orders received. Will follow orders and  continue to monitor.

## 2021-05-07 NOTE — ANESTHESIA PREPROCEDURE EVALUATION
Relevant Problems   No relevant active problems       Anesthetic History   No history of anesthetic complications            Review of Systems / Medical History  Patient summary reviewed and pertinent labs reviewed    Pulmonary  Within defined limits                 Neuro/Psych   Within defined limits           Cardiovascular  Within defined limits                  Comments: ETOH    Hyponatremia   GI/Hepatic/Renal  Within defined limits              Endo/Other        Morbid obesity     Other Findings              Physical Exam    Airway  Mallampati: II  TM Distance: 4 - 6 cm  Neck ROM: normal range of motion   Mouth opening: Normal     Cardiovascular               Dental  No notable dental hx       Pulmonary                 Abdominal  GI exam deferred       Other Findings            Anesthetic Plan    ASA: 3  Anesthesia type: MAC            Anesthetic plan and risks discussed with: Patient

## 2021-05-07 NOTE — WOUND CARE
Physical Exam   Focused assessment   Patient awake and alert, both legs open to air. He reports that the wet wrap the nurse put on last night helped with the itching some. Bilateral lower extremities with extensive redness and swelling. Black/brown necrotic tissue present mid-calf to ankle, circumferentially. Redness extends outward from this. Drainage noted and is serosanguinous and purulent. No odor noted. There are some satellite lesions extending from mid-calf erythema, but these do not appear to extend beyond the knee. Patient is scheduled for biopsy today. Will await these results prior to initiating more in-depth wound care. May continue to provide wet saline wraps BID until biopsy results available and appropriate abx course initiated. Topical treatment protocol in place. Care turned over to nursing staff at this time. Raya Herrera RN, BSN, [facility]

## 2021-05-07 NOTE — PROGRESS NOTES
[address] [place] [address] P&T Committee Protocol with respect to renal function. Pharmacy will continue to monitor patient daily and will make dosage adjustments based upon changing renal function. [facility]  Progress Note    Patient: Jeremiah Talaveraer Age: 62 y.o. : 1960 MR#: [id number] SSN: [id number]  Date/Time: 2021    Subjective:     Patient seen after skin biopsy. Patient is somnolent. Assessment/Plan:     Bilateral lower extremity cellulitis  Hyponatremia  Arthritis  Alcoholism  Tobacco abuse    Plan  Status post skin biopsy by general surgery today  Continue antibiotics. Follow cultures. ID is following. Currently not showing signs of alcohol withdrawal  Continue thiamine  Continue normal saline. Monitor sodium. Nephrology is following. Monitor labs  Vascular surgery consulted  PT OT  Smoking cessation education  Discussed with ID and will stop ceftriaxone and start cefepime to cover for Pseudomonas.     Case discussed with:  [x]Patient  []Family  []Nursing  []Case Management  DVT Prophylaxis:  [x]Lovenox  []Hep SQ  []SCDs  []Coumadin   []On Heparin gtt    Objective:   VS:   Visit Vitals  BP (!) 155/90 (BP 1 Location: Left upper arm, BP Patient Position: At rest)   Pulse 95   Temp 98.2 °F (36.8 °C)   Resp 15   Ht 5' 11\" (1.803 m)   Wt 122.9 kg (271 lb)   SpO2 99%   BMI 37.80 kg/m²      Tmax/24hrs: Temp (24hrs), Av.6 °F (37.6 °C), Min:98.2 °F (36.8 °C), Max:101.9 °F (38.8 °C)    Input/Output:     Intake/Output Summary (Last 24 hours) at 2021 1724  Last data filed at 2021 2240  Gross per 24 hour   Intake 240 ml   Output 600 ml   Net -360 ml       General: Somnolent  Cardiovascular:  S1S2+, RRR  Pulmonary:  CTA b/l  GI:  Soft, BS+, NT, ND  Extremities:  + edema, b/l LE cellulitis      Labs:    Recent Results (from the past 24 hour(s))   METABOLIC PANEL, BASIC    Collection Time: 21  8:30 PM   Result Value Ref Range    Sodium 126 (L) 136 - 145 mmol/L    Potassium 4.6 3.5 - 5.5 mmol/L    Chloride 94 (L) 100 - 111 mmol/L    CO2 25 21 - 32 mmol/L    Anion gap 7 3.0 - 18 mmol/L    Glucose 94 74 - 99 mg/dL    BUN 20 (H) 7.0 - 18 MG/DL    Creatinine 1.05 0.6 - 1.3 MG/DL    BUN/Creatinine ratio 19 12 - 20      GFR est AA >60 >60 ml/min/1.73m2    GFR est non-AA >60 >60 ml/min/1.73m2    Calcium 8.1 (L) 8.5 - 02.3 MG/DL   METABOLIC PANEL, BASIC    Collection Time: 05/07/21  3:06 AM   Result Value Ref Range    Sodium 125 (L) 136 - 145 mmol/L    Potassium 4.0 3.5 - 5.5 mmol/L    Chloride 94 (L) 100 - 111 mmol/L    CO2 22 21 - 32 mmol/L    Anion gap 9 3.0 - 18 mmol/L    Glucose 83 74 - 99 mg/dL    BUN 18 7.0 - 18 MG/DL    Creatinine 1.00 0.6 - 1.3 MG/DL    BUN/Creatinine ratio 18 12 - 20      GFR est AA >60 >60 ml/min/1.73m2    GFR est non-AA >60 >60 ml/min/1.73m2    Calcium 7.8 (L) 8.5 - 10.1 MG/DL   CBC WITH AUTOMATED DIFF    Collection Time: 05/07/21  3:06 AM   Result Value Ref Range    WBC 17.9 (H) 4.6 - 13.2 K/uL    RBC 3.89 (L) 4.35 - 5.65 M/uL    HGB 12.1 (L) 13.0 - 16.0 g/dL    HCT 34.2 (L) 36.0 - 48.0 %    MCV 87.9 74.0 - 97.0 FL    MCH 31.1 24.0 - 34.0 PG    MCHC 35.4 31.0 - 37.0 g/dL    RDW 12.6 11.6 - 14.5 %    PLATELET 082 408 - 927 K/uL    MPV 8.9 (L) 9.2 - 11.8 FL    NEUTROPHILS 87 (H) 40 - 73 %    LYMPHOCYTES 5 (L) 21 - 52 %    MONOCYTES 8 3 - 10 %    EOSINOPHILS 0 0 - 5 %    BASOPHILS 0 0 - 2 %    ABS. NEUTROPHILS 15.6 (H) 1.8 - 8.0 K/UL    ABS. LYMPHOCYTES 0.9 0.9 - 3.6 K/UL    ABS. MONOCYTES 1.4 (H) 0.05 - 1.2 K/UL    ABS. EOSINOPHILS 0.0 0.0 - 0.4 K/UL    ABS.  BASOPHILS 0.0 0.0 - 0.1 K/UL    DF AUTOMATED      PLATELET COMMENTS ADEQUATE PLATELETS      RBC COMMENTS NORMOCYTIC, NORMOCHROMIC     HEMOGLOBIN A1C W/O EAG    Collection Time: 05/07/21  3:06 AM   Result Value Ref Range    Hemoglobin A1c 5.4 4.2 - 5.6 %   TSH 3RD GENERATION    Collection Time: 05/07/21  3:06 AM   Result Value Ref Range    TSH 1.28 0.36 - 3.74 uIU/mL   LIPID PANEL    Collection Time: 05/07/21  3:06 AM   Result Value Ref Range    LIPID PROFILE          Cholesterol, total 96 <200 MG/DL    Triglyceride 55 <150 MG/DL    HDL Cholesterol 36 (L) 40 - 60 MG/DL LDL, calculated 49 0 - 100 MG/DL    VLDL, calculated 11 MG/DL    CHOL/HDL Ratio 2.7 0 - 5.0     MAGNESIUM    Collection Time: 05/07/21  3:06 AM   Result Value Ref Range    Magnesium 1.9 1.6 - 2.6 mg/dL   PROTHROMBIN TIME + INR    Collection Time: 05/07/21  3:06 AM   Result Value Ref Range    Prothrombin time 15.2 11.5 - 15.2 sec    INR 1.2 0.8 - 1.2     COVID-19 RAPID TEST    Collection Time: 05/07/21  6:55 AM   Result Value Ref Range    Specimen source Nasopharyngeal      COVID-19 rapid test Test Invalid, Please Recollect.  (A) NOTD     COVID-19 RAPID TEST    Collection Time: 05/07/21 10:02 AM   Result Value Ref Range    Specimen source Nasopharyngeal      COVID-19 rapid test Not detected NOTD       Additional Data Reviewed:      Signed By: Yefri Chang MD     May 7, 2021

## 2021-05-07 NOTE — ROUTINE PROCESS
Bedside shift change report given to Avelina Crowell RN (oncoming nurse) by Gigi Wyatt RN (offgoing nurse). Report included the following information SBAR, Kardex, Intake/Output and MAR.

## 2021-05-07 NOTE — PROGRESS NOTES
Infectious Disease  progress Note        Reason: Bilateral leg cellulitis    Current abx Prior abx   Ceftriaxone, vancomycin since 5/5/2021      Lines:       Assessment :    20-year-old man with past medical history significant for obesity, upper extremity cellulitis 4 years ago, arthritis admitted to [facility] on 5/5/2021 with increasing erythema/pain bilateral legs for several weeks. status post steroid injection in both knees on 3/18/21  H/o tick bite on abdomen around 4/18/21    Now with 5 week h/o increasing bilateral leg swelling, 3 week h/o increasing redness/pain bilateral legs with blackish discoloration of legs, cyanotic appearance of feet    Patient presents with a highly complex clinical picture. Clinical presentation c/w acute gangrenous cellulitis of both legs    Exact microbial etiology of infection is not entirely clear at this point. Preceding history of steroid injection, atypical presentation. Recommend to rule out atypical mycobacterial, fungal, gram-negative infections including Pseudomonas    Decreased erythema and warmth noted on today's exam    Recommendations:    1. D/c ceftriaxone, start cefepime. Continue vancomycin   2. Await skin biopsy-send specimen for histology, bacterial/fungal/AFB cultures  3. Modify antibiotics based on above results  4. Monitor CBC, clinically  5. Agree with surgery recommendations for vascular surgery consult     Above plan was discussed in details with patient, RN and dr Reed Vizcaino. Please call me if any further questions or concerns. Will continue to participate in the care of this patient. HPI:    Feels slightly better today. Decreased leg pain/itching        home Medication List    Details   ibuprofen (MOTRIN IB) 200 mg tablet Take  by mouth.      meloxicam (MOBIC) 15 mg tablet Take 1 Tab by mouth daily (with breakfast).   Qty: 30 Tab, Refills: 1    Associated Diagnoses: Cellulitis of right hand      naproxen (NAPROSYN) 375 mg tablet Take 1 Tab by mouth two (2) times daily (with meals). Qty: 20 Tab, Refills: 0      traMADol (ULTRAM) 50 mg tablet Take 1 Tab by mouth every six (6) hours as needed for Pain. Max Daily Amount: 200 mg.   Qty: 12 Tab, Refills: 0    Associated Diagnoses: Arthritis of right hand             Current Facility-Administered Medications   Medication Dose Route Frequency    lidocaine (PF) (XYLOCAINE) 10 mg/mL (1 %) injection 0.1 mL  0.1 mL SubCUTAneous PRN    lactated Ringers infusion  75 mL/hr IntraVENous CONTINUOUS    sodium chloride (NS) flush 5-40 mL  5-40 mL IntraVENous Q8H    sodium chloride (NS) flush 5-40 mL  5-40 mL IntraVENous PRN    acetaminophen (TYLENOL) tablet 650 mg  650 mg Oral Q6H PRN    Or    acetaminophen (TYLENOL) suppository 650 mg  650 mg Rectal Q6H PRN    polyethylene glycol (MIRALAX) packet 17 g  17 g Oral DAILY PRN    promethazine (PHENERGAN) tablet 12.5 mg  12.5 mg Oral Q6H PRN    Or    ondansetron (ZOFRAN) injection 4 mg  4 mg IntraVENous Q6H PRN    enoxaparin (LOVENOX) injection 40 mg  40 mg SubCUTAneous DAILY    diphenhydrAMINE (BENADRYL) capsule 50 mg  50 mg Oral Q6H PRN    hydrALAZINE (APRESOLINE) 20 mg/mL injection 10 mg  10 mg IntraVENous Q6H PRN    sodium chloride (NS) flush 5-40 mL  5-40 mL IntraVENous Q8H    sodium chloride (NS) flush 5-40 mL  5-40 mL IntraVENous PRN    LORazepam (ATIVAN) tablet 1 mg  1 mg Oral Q1H PRN    Or    LORazepam (ATIVAN) injection 1 mg  1 mg IntraVENous Q1H PRN    LORazepam (ATIVAN) tablet 2 mg  2 mg Oral Q1H PRN    Or    LORazepam (ATIVAN) injection 2 mg  2 mg IntraVENous Q1H PRN    LORazepam (ATIVAN) injection 3 mg  3 mg IntraVENous Q15MIN PRN    thiamine HCL (B-1) tablet 100 mg  100 mg Oral DAILY    folic acid (FOLVITE) tablet 1 mg  1 mg Oral DAILY    therapeutic multivitamin (THERAGRAN) tablet 1 Tab  1 Tab Oral DAILY    vancomycin (VANCOCIN) 1,000 mg in 0.9% sodium chloride 250 mL (VIAL-MATE)  1,000 mg IntraVENous Q12H    cefTRIAXone (ROCEPHIN) 2 g in sterile water (preservative free) 20 mL IV syringe  2 g IntraVENous Q24H       Allergies: Patient has no known allergies. Temp (24hrs), Av.9 °F (37.7 °C), Min:98.1 °F (36.7 °C), Max:101.9 °F (38.8 °C)    Visit Vitals  BP (!) 146/84 (BP 1 Location: Right upper arm, BP Patient Position: At rest)   Pulse 92   Temp 99.3 °F (37.4 °C)   Resp 18   Ht 5' 11\" (1.803 m)   Wt 122.9 kg (271 lb)   SpO2 96%   BMI 37.80 kg/m²       ROS: 12 point ROS obtained in details. Pertinent positives as mentioned in HPI,   otherwise negative    Physical Exam:    Vitals signs and nursing note reviewed. Constitutional:          Appearance: He is well-developed. obese. Laying on the bed, appears more comfortable than prior exam   HENT:      Head: Normocephalic and atraumatic. Eyes:      General: No scleral icterus. Conjunctiva/sclera: Conjunctivae normal.   Neck:      Musculoskeletal: Normal range of motion and neck supple. Vascular: No JVD. Cardiovascular:      Rate and Rhythm: Normal rate and regular rhythm. Heart sounds: Normal heart sounds. Pulmonary:      Effort: Pulmonary effort is normal.      Breath sounds: Normal breath sounds. Abdominal:      Palpations: Abdomen is soft. There is no mass. Tenderness: There is no abdominal tenderness. Musculoskeletal: Normal range of motion. necrotic skin both LE with surrounding erythema, superficial ulcers extending from few cm below the knee to ankle, mottled appearance both feet with blackish debris between the toes, able to palpate dorsalis pedis pulsation bilaterally  Lymphadenopathy:      Cervical: No cervical adenopathy. Skin:     General: Skin is warm and dry.    Neurological:      Mental Status: He is alert.            Labs: Results:   Chemistry Recent Labs     21  0306 21  2030 21  1444 21   GLU 83 94 108*   < > 168*   * 126* 124*   < > 119*   K 4.0 4.6 4.1   < > 4.0   CL 94* 94* 94*   < > 84* CO2 22 25 22   < > 25   BUN 18 20* 22*   < > 24*   CREA 1.00 1.05 1.06   < > 1.39*   CA 7.8* 8.1* 7.7*   < > 8.8   AGAP 9 7 8   < > 10   BUCR 18 19 21*   < > 17   AP  --   --   --   --  117   TP  --   --   --   --  7.7   ALB  --   --   --   --  2.5*   GLOB  --   --   --   --  5.2*   AGRAT  --   --   --   --  0.5*    < > = values in this interval not displayed. CBC w/Diff Recent Labs     05/07/21  0306 05/05/21 2010   WBC 17.9* 18.6*   RBC 3.89* 4.49   HGB 12.1* 13.7   HCT 34.2* 38.3    354   GRANS 87* 88*   LYMPH 5* 3*   EOS 0 1      Microbiology Recent Labs     05/05/21 2011 05/05/21 2010   CULT NO GROWTH 2 DAYS NO GROWTH 2 DAYS          RADIOLOGY:    All available imaging studies/reports in [facility] care for this admission were reviewed    High complexity decision making was performed during the evaluation of this patient at high risk for decompensation with multiple organ involvement       Disclaimer: Sections of this note are dictated utilizing voice recognition software, which may have resulted in some phonetic based errors in grammar and contents. Even though attempts were made to correct all the mistakes, some may have been missed, and remained in the body of the document. If questions arise, please contact our department.     Dr. Maya Marshall, Infectious Disease Specialist  [phone]  May 7, 2021  9:27 AM

## 2021-05-07 NOTE — PROGRESS NOTES
attempted an initial visit, but the patient was asleep and unable to be assessed at this time. Spiritual Care materials left at bedside. Marlyn Carrillo will continue to follow and provide pastoral care as needed or requested. .     Rev. Linda Riley M.Div.   Marlyn Carrillo  [phone]

## 2021-05-07 NOTE — PERIOP NOTES
TRANSFER - OUT REPORT:    Verbal report given to Mami Goodman RN on Jagjit Sow  being transferred to (unit) for routine post - op       Report consisted of patients Situation, Background, Assessment and   Recommendations(SBAR). Information from the following report(s) SBAR, Kardex, Procedure Summary, Intake/Output, MAR and Recent Results was reviewed with the receiving nurse. Lines:   Peripheral IV 05/05/21 Right Wrist (Active)   Site Assessment Clean, dry, & intact 05/07/21 1338   Phlebitis Assessment 0 05/07/21 1338   Infiltration Assessment 0 05/07/21 1338   Dressing Status Clean, dry, & intact 05/07/21 1338   Dressing Type Tape;Transparent 05/07/21 1338   Hub Color/Line Status Pink; Infusing 05/07/21 1338   Action Taken Blood drawn 05/05/21 1955       Peripheral IV 05/07/21 Anterior;Left;Proximal Forearm (Active)   Site Assessment Clean, dry, & intact 05/07/21 1338   Phlebitis Assessment 0 05/07/21 1338   Infiltration Assessment 0 05/07/21 1338   Dressing Status Clean, dry, & intact 05/07/21 1338   Dressing Type Tape;Transparent 05/07/21 1338   Hub Color/Line Status Pink;Capped 05/07/21 1338        Opportunity for questions and clarification was provided.       Patient transported with:   O2 @ 3 liters  Tech

## 2021-05-07 NOTE — PROGRESS NOTES
Pt vitals stable this shift. Rhythm regular. No c/o shortness of breath or chest pain. Biopsy preformed mid shift to LE. Results pending. Pt reports no complaints or pain or discomfort. Tolerating IV vancomycin. Resting comfortably in bed. Will cont to monitor.

## 2021-05-07 NOTE — PROGRESS NOTES
Physician Progress Note      Sharif Burden  CSN #:                  [id number]  :                       1960  ADMIT DATE:       2021 7:43 PM  [address] DATE:  RESPONDING  PROVIDER #:        Nadine Garza MD          QUERY TEXT:    Dear hospitalist,    Pt admitted with cellulitis of bilateral lower legs. Pt noted to have elevated WBC and elevated temperature. If possible, please document in the progress notes and discharge summary if you are evaluating and /or treating any of the following: The medical record reflects the following:  Risk Factors: cellulitis of bilateral lower legs    Clinical Indicators:  WBC:  17.9 on 2021  temp:  101.9 on 2021 @ 2330 and 100. on 2021 @ 0116  \"Clinical presentation c/w acute gangrenous cellulitis of both legs\"  per Dr. Pooja Rodríguez, consult note, 2021. \"1. Bilateral lower extremity cellulitis  2. Low-grade fever with leukocytosis\"  per Dr. Myron Shankar, H&P. Treatment: vancomycin 1,500mg IV and ceftriaxone 2g IV    Thank you,  BRENDA Reeder RN, [facility]  Office:  [phone]  Options provided:  -- Sepsis, present on admission  -- Sepsis, not present on admission  -- No Sepsis, cellulitis only  -- Other - I will add my own diagnosis  -- Disagree - Not applicable / Not valid  -- Disagree - Clinically unable to determine / Unknown  -- Refer to Clinical Documentation Reviewer    PROVIDER RESPONSE TEXT:    This patient has sepsis that was not present on admission. Query created by:  María Farfan on 2021 2:10 PM      Electronically signed by:  Nadine Garza MD 2021 5:39 PM

## 2021-05-08 LAB
ANION GAP SERPL CALC-SCNC: 6 MMOL/L (ref 3–18)
ATRIAL RATE: 359 BPM
BASOPHILS # BLD: 0.1 K/UL (ref 0–0.1)
BASOPHILS NFR BLD: 0 % (ref 0–2)
BUN SERPL-MCNC: 19 MG/DL (ref 7–18)
BUN/CREAT SERPL: 21 (ref 12–20)
CALCIUM SERPL-MCNC: 8.2 MG/DL (ref 8.5–10.1)
CALCULATED P AXIS, ECG09: -106 DEGREES
CALCULATED R AXIS, ECG10: 44 DEGREES
CALCULATED T AXIS, ECG11: -78 DEGREES
CHLORIDE SERPL-SCNC: 98 MMOL/L (ref 100–111)
CK MB CFR SERPL CALC: 1.7 % (ref 0–4)
CK MB SERPL-MCNC: 1.4 NG/ML (ref 5–25)
CK SERPL-CCNC: 83 U/L (ref 39–308)
CO2 SERPL-SCNC: 26 MMOL/L (ref 21–32)
CREAT SERPL-MCNC: 0.91 MG/DL (ref 0.6–1.3)
CRP SERPL HS-MCNC: >9.5 MG/L
DATE LAST DOSE: NORMAL
DIAGNOSIS, 93000: NORMAL
DIFFERENTIAL METHOD BLD: ABNORMAL
EOSINOPHIL # BLD: 0.2 K/UL (ref 0–0.4)
EOSINOPHIL NFR BLD: 1 % (ref 0–5)
ERYTHROCYTE [DISTWIDTH] IN BLOOD BY AUTOMATED COUNT: 13.1 % (ref 11.6–14.5)
GLUCOSE SERPL-MCNC: 89 MG/DL (ref 74–99)
HCT VFR BLD AUTO: 33.6 % (ref 36–48)
HGB BLD-MCNC: 11.4 G/DL (ref 13–16)
LYMPHOCYTES # BLD: 0.9 K/UL (ref 0.9–3.6)
LYMPHOCYTES NFR BLD: 7 % (ref 21–52)
MAGNESIUM SERPL-MCNC: 2.2 MG/DL (ref 1.6–2.6)
MCH RBC QN AUTO: 30.6 PG (ref 24–34)
MCHC RBC AUTO-ENTMCNC: 33.9 G/DL (ref 31–37)
MCV RBC AUTO: 90.1 FL (ref 74–97)
MONOCYTES # BLD: 1.1 K/UL (ref 0.05–1.2)
MONOCYTES NFR BLD: 8 % (ref 3–10)
NEUTS SEG # BLD: 10.5 K/UL (ref 1.8–8)
NEUTS SEG NFR BLD: 81 % (ref 40–73)
OSMOLALITY UR: 420 MOSMOL/KG
PLATELET # BLD AUTO: 347 K/UL (ref 135–420)
PMV BLD AUTO: 8.9 FL (ref 9.2–11.8)
POTASSIUM SERPL-SCNC: 4.2 MMOL/L (ref 3.5–5.5)
Q-T INTERVAL, ECG07: 174 MS
QRS DURATION, ECG06: 86 MS
QTC CALCULATION (BEZET), ECG08: 286 MS
RBC # BLD AUTO: 3.73 M/UL (ref 4.35–5.65)
REPORTED DOSE,DOSE: NORMAL UNITS
REPORTED DOSE/TIME,TMG: 0
SODIUM SERPL-SCNC: 130 MMOL/L (ref 136–145)
TROPONIN I SERPL-MCNC: <0.02 NG/ML (ref 0–0.04)
VANCOMYCIN TROUGH SERPL-MCNC: 15 UG/ML (ref 10–20)
VENTRICULAR RATE, ECG03: 163 BPM
WBC # BLD AUTO: 13.1 K/UL (ref 4.6–13.2)

## 2021-05-08 PROCEDURE — 80202 ASSAY OF VANCOMYCIN: CPT

## 2021-05-08 PROCEDURE — 97165 OT EVAL LOW COMPLEX 30 MIN: CPT

## 2021-05-08 PROCEDURE — 74011000250 HC RX REV CODE- 250: Performed by: EMERGENCY MEDICINE

## 2021-05-08 PROCEDURE — 74011000250 HC RX REV CODE- 250: Performed by: HOSPITALIST

## 2021-05-08 PROCEDURE — 99233 SBSQ HOSP IP/OBS HIGH 50: CPT | Performed by: HOSPITALIST

## 2021-05-08 PROCEDURE — 99254 IP/OBS CNSLTJ NEW/EST MOD 60: CPT | Performed by: INTERNAL MEDICINE

## 2021-05-08 PROCEDURE — 74011250636 HC RX REV CODE- 250/636: Performed by: HOSPITALIST

## 2021-05-08 PROCEDURE — 74011000258 HC RX REV CODE- 258: Performed by: HOSPITALIST

## 2021-05-08 PROCEDURE — 83735 ASSAY OF MAGNESIUM: CPT

## 2021-05-08 PROCEDURE — 74011250636 HC RX REV CODE- 250/636: Performed by: EMERGENCY MEDICINE

## 2021-05-08 PROCEDURE — 36415 COLL VENOUS BLD VENIPUNCTURE: CPT

## 2021-05-08 PROCEDURE — 65270000029 HC RM PRIVATE

## 2021-05-08 PROCEDURE — 74011250636 HC RX REV CODE- 250/636: Performed by: FAMILY MEDICINE

## 2021-05-08 PROCEDURE — 85025 COMPLETE CBC W/AUTO DIFF WBC: CPT

## 2021-05-08 PROCEDURE — 74011250637 HC RX REV CODE- 250/637: Performed by: HOSPITALIST

## 2021-05-08 PROCEDURE — 80048 BASIC METABOLIC PNL TOTAL CA: CPT

## 2021-05-08 PROCEDURE — 82553 CREATINE MB FRACTION: CPT

## 2021-05-08 PROCEDURE — 74011250637 HC RX REV CODE- 250/637: Performed by: FAMILY MEDICINE

## 2021-05-08 PROCEDURE — 93005 ELECTROCARDIOGRAM TRACING: CPT

## 2021-05-08 PROCEDURE — 74011250636 HC RX REV CODE- 250/636: Performed by: SPECIALIST

## 2021-05-08 PROCEDURE — 2709999900 HC NON-CHARGEABLE SUPPLY

## 2021-05-08 RX ORDER — DILTIAZEM HYDROCHLORIDE 5 MG/ML
10 INJECTION INTRAVENOUS ONCE
Status: COMPLETED | OUTPATIENT
Start: 2021-05-08 | End: 2021-05-08

## 2021-05-08 RX ORDER — HYDROCODONE BITARTRATE AND ACETAMINOPHEN 5; 325 MG/1; MG/1
2 TABLET ORAL
Status: DISCONTINUED | OUTPATIENT
Start: 2021-05-08 | End: 2021-05-13 | Stop reason: HOSPADM

## 2021-05-08 RX ORDER — METOPROLOL TARTRATE 25 MG/1
25 TABLET, FILM COATED ORAL EVERY 12 HOURS
Status: DISCONTINUED | OUTPATIENT
Start: 2021-05-08 | End: 2021-05-09

## 2021-05-08 RX ORDER — SODIUM CHLORIDE 9 MG/ML
75 INJECTION, SOLUTION INTRAVENOUS CONTINUOUS
Status: DISCONTINUED | OUTPATIENT
Start: 2021-05-08 | End: 2021-05-10

## 2021-05-08 RX ORDER — MORPHINE SULFATE 2 MG/ML
2 INJECTION, SOLUTION INTRAMUSCULAR; INTRAVENOUS
Status: DISCONTINUED | OUTPATIENT
Start: 2021-05-08 | End: 2021-05-13 | Stop reason: HOSPADM

## 2021-05-08 RX ORDER — ASPIRIN 81 MG/1
81 TABLET ORAL DAILY
Status: DISCONTINUED | OUTPATIENT
Start: 2021-05-09 | End: 2021-05-13 | Stop reason: HOSPADM

## 2021-05-08 RX ADMIN — FOLIC ACID 1 MG: 1 TABLET ORAL at 11:02

## 2021-05-08 RX ADMIN — HYDROCODONE BITARTRATE AND ACETAMINOPHEN 2 TABLET: 5; 325 TABLET ORAL at 21:15

## 2021-05-08 RX ADMIN — Medication 10 ML: at 15:21

## 2021-05-08 RX ADMIN — ENOXAPARIN SODIUM 40 MG: 40 INJECTION SUBCUTANEOUS at 11:02

## 2021-05-08 RX ADMIN — LORAZEPAM 2 MG: 1 TABLET ORAL at 21:30

## 2021-05-08 RX ADMIN — Medication 10 ML: at 05:32

## 2021-05-08 RX ADMIN — WATER 2 G: 1 INJECTION INTRAMUSCULAR; INTRAVENOUS; SUBCUTANEOUS at 18:02

## 2021-05-08 RX ADMIN — DILTIAZEM HYDROCHLORIDE 10 MG: 5 INJECTION INTRAVENOUS at 13:09

## 2021-05-08 RX ADMIN — Medication 10 ML: at 21:39

## 2021-05-08 RX ADMIN — Medication 10 ML: at 05:30

## 2021-05-08 RX ADMIN — SODIUM CHLORIDE 75 ML/HR: 900 INJECTION, SOLUTION INTRAVENOUS at 18:08

## 2021-05-08 RX ADMIN — VANCOMYCIN HYDROCHLORIDE 1500 MG: 10 INJECTION, POWDER, LYOPHILIZED, FOR SOLUTION INTRAVENOUS at 15:21

## 2021-05-08 RX ADMIN — METOPROLOL TARTRATE 25 MG: 25 TABLET, FILM COATED ORAL at 13:43

## 2021-05-08 RX ADMIN — VANCOMYCIN HYDROCHLORIDE 1500 MG: 10 INJECTION, POWDER, LYOPHILIZED, FOR SOLUTION INTRAVENOUS at 23:26

## 2021-05-08 RX ADMIN — Medication 10 ML: at 00:28

## 2021-05-08 RX ADMIN — Medication 10 ML: at 00:19

## 2021-05-08 RX ADMIN — DIPHENHYDRAMINE HYDROCHLORIDE 50 MG: 25 CAPSULE ORAL at 21:38

## 2021-05-08 RX ADMIN — WATER 2 G: 1 INJECTION INTRAMUSCULAR; INTRAVENOUS; SUBCUTANEOUS at 11:02

## 2021-05-08 RX ADMIN — HYDROCODONE BITARTRATE AND ACETAMINOPHEN 2 TABLET: 5; 325 TABLET ORAL at 13:43

## 2021-05-08 RX ADMIN — SODIUM CHLORIDE 10 MG/HR: 900 INJECTION, SOLUTION INTRAVENOUS at 13:43

## 2021-05-08 RX ADMIN — METOPROLOL TARTRATE 25 MG: 25 TABLET, FILM COATED ORAL at 21:38

## 2021-05-08 RX ADMIN — WATER 2 G: 1 INJECTION INTRAMUSCULAR; INTRAVENOUS; SUBCUTANEOUS at 01:49

## 2021-05-08 RX ADMIN — VANCOMYCIN HYDROCHLORIDE 1500 MG: 10 INJECTION, POWDER, LYOPHILIZED, FOR SOLUTION INTRAVENOUS at 00:41

## 2021-05-08 RX ADMIN — Medication 100 MG: at 11:02

## 2021-05-08 RX ADMIN — SODIUM CHLORIDE 75 ML/HR: 900 INJECTION, SOLUTION INTRAVENOUS at 09:00

## 2021-05-08 RX ADMIN — THERA TABS 1 TABLET: TAB at 11:03

## 2021-05-08 NOTE — PROGRESS NOTES
[facility] Hospitalist Group  Progress Note    Patient: Cayla Ruiz Age: 62 y.o. : 1960 MR#: [id number] SSN: [id number]  Date/Time: 2021    Subjective:     RN called me that patient is tachycardic with heart rate in 170s. Patient seen and evaluated at the bedside. Patient feels fine, Denies any palpitations, no chest pain, no shortness of breath. Patient complains of pain in his bilateral foot. Worse with the dressing change. Assessment/Plan:     1. A Flutter RVR   2. Bilateral lower extremity cellulitis  3. SEPSIS with tachy and leukocytosis due to # 2, POA  4. Hypertension  5. Hyponatremia  6. Arthritis  7. Alcoholism  8. Tobacco abuse    Plan  1. We will give IV Cardizem push and start him on IV Cardizem drip. Cardiology will be consulted. Discussed with Ms. Princess Gallardo, [address] [place] [address]. We will get EKG and cardiac enzymes. 2. Will also add p.o. metoprolol. 3. Pending results of skin biopsy. 4. Continue antibiotics. Follow cultures. Discussed with PHILOMENA Mi 5. Currently not showing signs of alcohol withdrawal  6. Continue thiamine  7. Will all pain medicines  8. Continue normal saline. Monitor sodium. Nephrology is following. 9. Vascular surgery input noted. 10. Continue PT OT    I spent 40 minutes with the patient in face-to-face consultation, of which greater than 50% was spent in counseling and coordination of care as described above      Discussed with the patient and also with his wife over the phone and explained about my above plan of care. Both of them understood and agreed with my plan.     Case discussed with:  [x]Patient  [x]Family  []Nursing  []Case Management  DVT Prophylaxis:  [x]Lovenox  []Hep SQ  []SCDs  []Coumadin   []On Heparin gtt    Objective:   VS:   Visit Vitals  BP (!) 168/78 (BP 1 Location: Right upper arm, BP Patient Position: At rest)   Pulse (!) 189 Comment: 174 manual  dr notified    Temp 99.5 °F (37.5 °C)   Resp 20   Ht 5' 11\" (1.803 m)   Wt 122.9 kg (271 lb)   SpO2 99%   BMI 37.80 kg/m²      Tmax/24hrs: Temp (24hrs), Av.8 °F (37.1 °C), Min:98.2 °F (36.8 °C), Max:99.5 °F (37.5 °C)    Input/Output:     Intake/Output Summary (Last 24 hours) at 2021 1303  Last data filed at 2021 0800  Gross per 24 hour   Intake 360 ml   Output 800 ml   Net -440 ml       General: Alert awake, no acute distress. Cardiovascular: Irregular rate and rhythm, tachycardia  Pulmonary:  CTA b/l  GI:  Soft, BS+, NT, ND  Extremities: Bilateral lower extremity extensive redness erythema and multiple areas of skin necrosis. Good ROM in ankle and knees. Alert awake on x3, moves all extremities. Labs:    Recent Results (from the past 24 hour(s))   CULTURE, TISSUE W GRAM STAIN    Collection Time: 21  1:19 PM    Specimen: Skin   Result Value Ref Range    Special Requests: RT LEG     GRAM STAIN NO WBC'S SEEN      GRAM STAIN FEW GRAM POSITIVE COCCI IN PAIRS      Culture result: PENDING    SED RATE (ESR)    Collection Time: 21  9:00 PM   Result Value Ref Range    Sed rate, automated 119 (H) 0 - 20 mm/hr   CBC WITH AUTOMATED DIFF    Collection Time: 21  3:02 AM   Result Value Ref Range    WBC 13.1 4.6 - 13.2 K/uL    RBC 3.73 (L) 4.35 - 5.65 M/uL    HGB 11.4 (L) 13.0 - 16.0 g/dL    HCT 33.6 (L) 36.0 - 48.0 %    MCV 90.1 74.0 - 97.0 FL    MCH 30.6 24.0 - 34.0 PG    MCHC 33.9 31.0 - 37.0 g/dL    RDW 13.1 11.6 - 14.5 %    PLATELET 923 599 - 412 K/uL    MPV 8.9 (L) 9.2 - 11.8 FL    NEUTROPHILS 81 (H) 40 - 73 %    LYMPHOCYTES 7 (L) 21 - 52 %    MONOCYTES 8 3 - 10 %    EOSINOPHILS 1 0 - 5 %    BASOPHILS 0 0 - 2 %    ABS. NEUTROPHILS 10.5 (H) 1.8 - 8.0 K/UL    ABS. LYMPHOCYTES 0.9 0.9 - 3.6 K/UL    ABS. MONOCYTES 1.1 0.05 - 1.2 K/UL    ABS. EOSINOPHILS 0.2 0.0 - 0.4 K/UL    ABS.  BASOPHILS 0.1 0.0 - 0.1 K/UL    DF AUTOMATED     METABOLIC PANEL, BASIC    Collection Time: 21  3:02 AM   Result Value Ref Range    Sodium 130 (L) 136 - 145 mmol/L    Potassium 4.2 3.5 - 5.5 mmol/L    Chloride 98 (L) 100 - 111 mmol/L    CO2 26 21 - 32 mmol/L    Anion gap 6 3.0 - 18 mmol/L    Glucose 89 74 - 99 mg/dL    BUN 19 (H) 7.0 - 18 MG/DL    Creatinine 0.91 0.6 - 1.3 MG/DL    BUN/Creatinine ratio 21 (H) 12 - 20      GFR est AA >60 >60 ml/min/1.73m2    GFR est non-AA >60 >60 ml/min/1.73m2    Calcium 8.2 (L) 8.5 - 10.1 MG/DL   MAGNESIUM    Collection Time: 05/08/21  3:02 AM   Result Value Ref Range    Magnesium 2.2 1.6 - 2.6 mg/dL   EKG, 12 LEAD, INITIAL    Collection Time: 05/08/21 12:51 PM   Result Value Ref Range    Ventricular Rate 163 BPM    Atrial Rate 359 BPM    QRS Duration 86 ms    Q-T Interval 174 ms    QTC Calculation (Bezet) 286 ms    Calculated P Axis -106 degrees    Calculated R Axis 44 degrees    Calculated T Axis -78 degrees    Diagnosis       Atrial flutter with variable AV block  Possible Inferior infarct , age undetermined  ST & T wave abnormality, consider anterolateral ischemia  Abnormal ECG  When compared with ECG of 08-MAY-2021 12:50,  Borderline criteria for Inferior infarct are now present  ST now depressed in Inferior leads       Additional Data Reviewed:      Signed By: Vanessa Beard MD     May 8, 2021

## 2021-05-08 NOTE — CONSULTS
Consult    Patient: Jesus Ball MRN: [id number]  SSN: [id number]    YOB: 1960  Age: 62 y.o. Sex: male      Subjective:      Jseus Ball is a 62 y.o. male who presents with bilateral leg ulceration, hyponatremia, JENN, and alcohol abuse. States that his legs have been ulcerated for weeks. Patient is difficult to obtain accurate history from. Yuri Barksdale History reviewed. No pertinent past medical history. Past Surgical History:   Procedure Laterality Date    HX ORTHOPAEDIC        Family History   Problem Relation Age of Onset    Diabetes Mother     No Known Problems Father     Diabetes Maternal Grandmother      Social History     Tobacco Use    Smoking status: Former Smoker     Types: Cigarettes     Quit date: 3/26/1998     Years since quittin.1    Smokeless tobacco: Never Used    Tobacco comment: AROUND WIFE WHO SMOKES/2ND HAND SMOKE   Substance Use Topics    Alcohol use: Yes     Alcohol/week: 18.0 standard drinks     Types: 18 Cans of beer per week      Prior to Admission medications    Medication Sig Start Date End Date Taking? Authorizing Provider   ibuprofen (MOTRIN IB) 200 mg tablet Take  by mouth. Provider, Historical   meloxicam (MOBIC) 15 mg tablet Take 1 Tab by mouth daily (with breakfast). 3/26/18   Nicolas Forte MD   naproxen (NAPROSYN) 375 mg tablet Take 1 Tab by mouth two (2) times daily (with meals). 3/19/18   Avni Telles PA-C   traMADol [place]) 50 mg tablet Take 1 Tab by mouth every six (6) hours as needed for Pain. Max Daily Amount: 200 mg. 3/19/18   Avni Telles PA-C        No Known Allergies    Review of Systems:  Review of systems not obtained due to patient factors.     Objective:     Vitals:    21 2020 21 0019 21 0400 21 0816   BP: (!) 153/84 (!) 167/83 (!) 162/81 (!) 164/88   Pulse: 99 (!) 110 99 99   Resp:    Temp: 98.4 °F (36.9 °C) 99 °F (37.2 °C) 99 °F (37.2 °C) 99.5 °F (37.5 °C)   SpO2: 99% 99% 99% 97%   Weight:       Height:            Physical Exam:  GENERAL: alert, cooperative, no distress, appears older than stated age  EYE: negative  THROAT & NECK: normal and no erythema or exudates noted. LUNG: clear to auscultation bilaterally  HEART: regular rate and rhythm, S1, S2 normal, no murmur, click, rub or gallop  ABDOMEN: soft, non-tender. Bowel sounds normal. No masses,  no organomegaly  EXTREMITIES:  Bilateral LE with ulceration and cellulitis of the lower legs. Gauze dressing in place. Bilateral DP palpable. Assessment:     Hospital Problems  Date Reviewed: 4/7/2021          Codes Class Noted POA    Sepsis [facility]) ICD-10-CM: A41.9  ICD-9-CM: 038.9, 995.91  5/5/2021 Unknown          Bilateral LE ulceration with cellulitis. Biopsy pending. Plan:     If biopsy negative, will need consistent compressive wound care such as Unna boot application with frequent change. Will follow along.      Signed By: Brayden An MD     May 8, 2021

## 2021-05-08 NOTE — CONSULTS
Cardiology Consult Note    Consultation request by Juancarlos Kramer MD for advice/opinion related to evaluating aflutter with RVR    Date of  Admission: 5/5/2021  7:43 PM   Primary Care Physician:  Morgan Banks MD    Consulting Cardiologist: Dr. Sonali Campos     Assessment:     -Sepsis associated with bilateral lower extremity cellulitis. Tmax 101.9F. S/p skin biopsy 5/7/2021.  -Atrial flutter with rapid ventricular response in setting of above.  -Echocardiogram 5/6/2021: EF 55-60%, LV with normal cavity size, wall thickness and wall motion  -Elevated BP without Hx HTN  -EtOH use    No primary cardiologist      Atrial fibrillation CHADSVASC2 score stroke risk:   62 y.o.                                       <65        + 0   male                                        Male     [de-identified]  CHF hx                                          No    + 0  HTN hx                                          Yes    +1  Stroke/TIA/Thromboembolism       No    +0  Vascular disease hx                      No    + 0  Diabetes Mellitus                           No    + 0   CHADSVASC2 score                  1      Annual Stroke Risk 0.6% - low-moderate risk        Plan:     -Pt started on Cardizem infusion, PO Lopressor this afternoon as ordered by hospitalist.  -[id number] score of 1 given HTN this admission, will start 81 mg ASA. -Echocardiogram, TSH normal this admission.  -Vascular surgery consulted, appreciate assistance. -Volume management per nephrology team, appreciate assistance.  -Antibiotics per primary team.  -We will follow along. Seen and independently examined. Agree with below with the following comments: Patient with new onset rapid atrial flutter this afternoon. He is asymptomatic to the arrhythmia and hemodynamically stable. Agree with starting rate controlling agents with IV diltiazem. Can also add either oral metoprolol or oral diltiazem to help with rate control.   Hopefully he will convert to sinus rhythm on his own. He does not need to be started on oral anticoagulation. Agree with low-dose aspirin. Continue to treat his underlying cellulitis. Kevin Beckwith MD     History of Present Illness: This is a 62 y.o. male admitted for Sepsis ([facility] [A41.9]. Patient complains of: chills    Lorena Martinez is a 62 y.o. male who presented to the hospital due to chills. Pt reports that he has had an infection of his legs since 2021 but came to the hospital due to chills, noting that he had to keep covered although it was \"90 degrees in the house. \"  He states he has been having weeping from both of his legs. He denies chest pain, shortness of breath or palpitations. No known cardiac history. Denies Hx HTN, DM, TIA/CVA or blood clots. Cardiac risk factors: obesity, male gender      Review of Symptoms:  Except as stated above include:  Constitutional:  As per HPI  Respiratory:  negative  Cardiovascular:  negative  Gastrointestinal: negative  Genitourinary:  negative  Musculoskeletal:  Negative  Neurological:  Negative  Dermatological:  As per HPI  Endocrinological: Negative  Psychological:  Negative       Past Medical History:   History reviewed. No pertinent past medical history. Social History:     Social History     Socioeconomic History    Marital status:      Spouse name: Not on file    Number of children: Not on file    Years of education: Not on file    Highest education level: Not on file   Tobacco Use    Smoking status: Former Smoker     Types: Cigarettes     Quit date: 3/26/1998     Years since quittin.1    Smokeless tobacco: Never Used    Tobacco comment: AROUND WIFE WHO SMOKES/2ND HAND SMOKE   Substance and Sexual Activity    Alcohol use:  Yes     Alcohol/week: 18.0 standard drinks     Types: 18 Cans of beer per week    Drug use: No        Family History:     Family History   Problem Relation Age of Onset    Diabetes Mother     No Known Problems Father    Stephanie Thorne Diabetes Maternal Grandmother         Medications:   No Known Allergies     Current Facility-Administered Medications   Medication Dose Route Frequency    0.9% sodium chloride infusion  75 mL/hr IntraVENous CONTINUOUS    metoprolol tartrate (LOPRESSOR) tablet 25 mg  25 mg Oral Q12H    dilTIAZem (CARDIZEM) 100 mg in 0.9% sodium chloride (MBP/ADV) 100 mL infusion  10 mg/hr IntraVENous CONTINUOUS    morphine injection 2 mg  2 mg IntraVENous Q4H PRN    HYDROcodone-acetaminophen (NORCO) 5-325 mg per tablet 2 Tab  2 Tab Oral Q6H PRN    vancomycin (VANCOCIN) 1500 mg in  ml infusion  1,500 mg IntraVENous Q12H    cefepime (MAXIPIME) 2 g in sterile water (preservative free) 10 mL IV syringe  2 g IntraVENous Q8H    sodium chloride (NS) flush 5-40 mL  5-40 mL IntraVENous Q8H    sodium chloride (NS) flush 5-40 mL  5-40 mL IntraVENous PRN    acetaminophen (TYLENOL) tablet 650 mg  650 mg Oral Q6H PRN    Or    acetaminophen (TYLENOL) suppository 650 mg  650 mg Rectal Q6H PRN    polyethylene glycol (MIRALAX) packet 17 g  17 g Oral DAILY PRN    promethazine (PHENERGAN) tablet 12.5 mg  12.5 mg Oral Q6H PRN    Or    ondansetron (ZOFRAN) injection 4 mg  4 mg IntraVENous Q6H PRN    enoxaparin (LOVENOX) injection 40 mg  40 mg SubCUTAneous DAILY    diphenhydrAMINE (BENADRYL) capsule 50 mg  50 mg Oral Q6H PRN    hydrALAZINE (APRESOLINE) 20 mg/mL injection 10 mg  10 mg IntraVENous Q6H PRN    sodium chloride (NS) flush 5-40 mL  5-40 mL IntraVENous Q8H    sodium chloride (NS) flush 5-40 mL  5-40 mL IntraVENous PRN    LORazepam (ATIVAN) tablet 1 mg  1 mg Oral Q1H PRN    Or    LORazepam (ATIVAN) injection 1 mg  1 mg IntraVENous Q1H PRN    LORazepam (ATIVAN) tablet 2 mg  2 mg Oral Q1H PRN    Or    LORazepam (ATIVAN) injection 2 mg  2 mg IntraVENous Q1H PRN    LORazepam (ATIVAN) injection 3 mg  3 mg IntraVENous Q15MIN PRN    thiamine HCL (B-1) tablet 100 mg  100 mg Oral DAILY    folic acid (FOLVITE) tablet 1 mg  1 mg Oral DAILY    therapeutic multivitamin (THERAGRAN) tablet 1 Tab  1 Tab Oral DAILY         Physical Exam:     Visit Vitals  BP (!) 168/78 (BP 1 Location: Right upper arm, BP Patient Position: At rest)   Pulse (!) 189   Temp 99.5 °F (37.5 °C)   Resp 20   Ht 5' 11\" (1.803 m)   Wt 122.9 kg (271 lb)   SpO2 99%   BMI 37.80 kg/m²       TELE: atrial flutter with RVR    BP Readings from Last 3 Encounters:   05/08/21 (!) 168/78   04/21/21 (!) 175/96   03/18/21 (!) 164/103     Pulse Readings from Last 3 Encounters:   05/08/21 (!) 189   04/21/21 85   04/07/21 94     Wt Readings from Last 3 Encounters:   05/06/21 122.9 kg (271 lb)   04/20/21 122.5 kg (270 lb)   04/07/21 122.5 kg (270 lb)       General:  alert, cooperative, no distress, appears stated age  Neck:  supple  Lungs:  clear to auscultation bilaterally to anterolateral lung fields  Heart:  Tachycardic irregular rhythm  Abdomen:  abdomen is soft without significant tenderness, masses, organomegaly or guarding  Extremities:  Ulceration and cellulitis noted to bilateral lower legs   Skin: Bilateral lower extremities with ulceration and cellulitis of lower legs  Neuro: alert, answering questions appropriately, no focal deficits ntoed  Psych: non focal     Data Review:     Recent Labs     05/08/21  0302 05/07/21  0306 05/05/21 2010   WBC 13.1 17.9* 18.6*   HGB 11.4* 12.1* 13.7   HCT 33.6* 34.2* 38.3    343 354     Recent Labs     05/08/21  0302 05/07/21  0306 05/06/21  2030 05/05/21 2010 05/05/21 2010   * 125* 126*   < > 119*   K 4.2 4.0 4.6   < > 4.0   CL 98* 94* 94*   < > 84*   CO2 26 22 25   < > 25   GLU 89 83 94   < > 168*   BUN 19* 18 20*   < > 24*   CREA 0.91 1.00 1.05   < > 1.39*   CA 8.2* 7.8* 8.1*   < > 8.8   MG 2.2 1.9  --   --   --    ALB  --   --   --   --  2.5*   ALT  --   --   --   --  31   INR  --  1.2  --   --   --     < > = values in this interval not displayed.        Results for orders placed or performed during the hospital encounter of 05/05/21   EKG, 12 LEAD, INITIAL   Result Value Ref Range    Ventricular Rate 163 BPM    Atrial Rate 359 BPM    QRS Duration 86 ms    Q-T Interval 174 ms    QTC Calculation (Bezet) 286 ms    Calculated P Axis -106 degrees    Calculated R Axis 44 degrees    Calculated T Axis -78 degrees    Diagnosis       Atrial flutter with variable AV block  Possible Inferior infarct , age undetermined  ST & T wave abnormality, consider anterolateral ischemia  Abnormal ECG  When compared with ECG of 08-MAY-2021 12:50,  Borderline criteria for Inferior infarct are now present  ST now depressed in Inferior leads           Last Lipid:    Lab Results   Component Value Date/Time    Cholesterol, total 96 05/07/2021 03:06 AM    HDL Cholesterol 36 (L) 05/07/2021 03:06 AM    LDL, calculated 49 05/07/2021 03:06 AM    Triglyceride 55 05/07/2021 03:06 AM    CHOL/HDL Ratio 2.7 05/07/2021 03:06 AM       Cardiographics:     EKG Results     Procedure 720 Value Units Date/Time    EKG, 12 LEAD, INITIAL [[id number]] Collected: 05/08/21 1251    Order Status: Completed Updated: 05/08/21 1257     Ventricular Rate 163 BPM      Atrial Rate 359 BPM      QRS Duration 86 ms      Q-T Interval 174 ms      QTC Calculation (Bezet) 286 ms      Calculated P Axis -106 degrees      Calculated R Axis 44 degrees      Calculated T Axis -78 degrees      Diagnosis --     Atrial flutter with variable AV block  Possible Inferior infarct , age undetermined  ST & T wave abnormality, consider anterolateral ischemia  Abnormal ECG  When compared with ECG of 08-MAY-2021 12:50,  Borderline criteria for Inferior infarct are now present  ST now depressed in Inferior leads      EKG, 12 LEAD, INITIAL [[id number]] Collected: 05/05/21 2023    Order Status: Completed Updated: 05/06/21 1433     Ventricular Rate 111 BPM      Atrial Rate 111 BPM      P-R Interval 170 ms      QRS Duration 80 ms      Q-T Interval 306 ms      QTC Calculation (Bezet) 416 ms      Calculated P Axis 49 degrees Calculated R Axis 69 degrees      Calculated T Axis 84 degrees      Diagnosis --     Sinus tachycardia  Otherwise normal ECG  No previous ECGs available  Confirmed by Aftab Martin MD, ----- ([id number]) on 5/6/2021 2:33:06 PM          05/05/21   ECHO ADULT COMPLETE 05/06/2021 5/6/2021    Narrative · LV: Estimated LVEF is 55 - 60%. Visually measured ejection fraction. Normal cavity size, wall thickness and systolic function (ejection   fraction normal). Wall motion: normal.  · AV: Aortic valve leaflet calcification present. · Image quality for this study was technically difficult. · Echo study was limited due to patient's condition. · LA: Left Atrium volume index is 28 mL/m2. Signed by: Valley Severs, MD               XR Results (most recent):  Results from Hospital Encounter encounter on 05/05/21   XR CHEST PA LAT    Narrative EXAM: Chest Radiographs    INDICATION:  Sepsis    TECHNIQUE: PA and lateral views of the chest    COMPARISON: None. FINDINGS: No pneumothorax identified. The lungs are clear. No infiltrates  identified. No effusions appreciated. The cardiomediastinal silhouette is  unremarkable. The pulmonary vascularity is unremarkable. Degenerative changes  of the spine. Impression 1. No acute infiltrate or effusion.          Signed By: Kristian Montelongo PA-C     May 8, 2021

## 2021-05-08 NOTE — ROUTINE PROCESS
HR sustaining in 170-180. Notified MD Chrystal Morales .   V/O for EKG and Bp recheck    Pt is calm, not in distress  When HR is elevated pt is eating or using the urinal.   Denies CP, SOB   Pt c/o of meche leg pain related to cellulitis

## 2021-05-08 NOTE — PROGRESS NOTES
RENAL PROGRESS NOTE        Kassie Elizalde         Assessment  - Hyponatremia --> Beer potomania / decrease solute intake / NSAIDS   - B/L LE cellulitis   - Resolved JENN   - Alcohol abuse     Plan   - Na level has improved up to 130 , correction is appropriate , continue with IVF and assess in am   - Check Na level daily   - Focus on nutrition , will add nutritional supplement       ·                                                                                                                                Subjective:  Patient having LE pain       Patient Active Problem List   Diagnosis Code    Sepsis ([facility] A41.9       Current Facility-Administered Medications   Medication Dose Route Frequency Provider Last Rate Last Admin    Vancomycin Trough Prior to 1200 Dose on 5/8  1 Each Other ONCE Nhi Ayon MD        vancomycin (VANCOCIN) 1500 mg in  ml infusion  1,500 mg IntraVENous Q12H Jeffrey WALKER .3 mL/hr at 05/08/21 0041 1,500 mg at 05/08/21 0041    0.9% sodium chloride infusion  75 mL/hr IntraVENous CONTINUOUS Bichu, Kiki CAMEJO MD 75 mL/hr at 05/07/21 1143 75 mL/hr at 05/07/21 1143    cefepime (MAXIPIME) 2 g in sterile water (preservative free) 10 mL IV syringe  2 g IntraVENous Q8H Branden Sanches MD   2 g at 05/08/21 0149    sodium chloride (NS) flush 5-40 mL  5-40 mL IntraVENous Q8H Cristian Gamez MD   10 mL at 05/08/21 0530    sodium chloride (NS) flush 5-40 mL  5-40 mL IntraVENous PRN Cristian Gamez MD        acetaminophen (TYLENOL) tablet 650 mg  650 mg Oral Q6H PRN Cristian Gamez MD   650 mg at 05/06/21 2228    Or    acetaminophen (TYLENOL) suppository 650 mg  650 mg Rectal Q6H PRN Cristian Gamez MD   650 mg at 05/07/21 0558    polyethylene glycol (MIRALAX) packet 17 g  17 g Oral DAILY PRN Cristian Gamez MD        promethazine (PHENERGAN) tablet 12.5 mg  12.5 mg Oral Q6H PRN Cristian Gamez MD        Or    ondansetron [facility]) injection 4 mg  4 mg IntraVENous Q6H PRN Mustapha Jones MD        enoxaparin (LOVENOX) injection 40 mg  40 mg SubCUTAneous DAILY Mustapha Jones MD   Stopped at 05/07/21 0900    diphenhydrAMINE (BENADRYL) capsule 50 mg  50 mg Oral Q6H PRN Mustapha Jones MD   50 mg at 05/06/21 2228    hydrALAZINE (APRESOLINE) 20 mg/mL injection 10 mg  10 mg IntraVENous Q6H PRN Mustapha Jones MD   10 mg at 05/06/21 2228    sodium chloride (NS) flush 5-40 mL  5-40 mL IntraVENous Q8H Mustapha Jones MD   10 mL at 05/08/21 0532    sodium chloride (NS) flush 5-40 mL  5-40 mL IntraVENous PRN Mustapha Jones MD        LORazepam (ATIVAN) tablet 1 mg  1 mg Oral Q1H PRN Mustapha Jones MD        Or    LORazepam (ATIVAN) injection 1 mg  1 mg IntraVENous Q1H PRN Mustapha Jones MD        LORazepam (ATIVAN) tablet 2 mg  2 mg Oral Q1H PRN Mustapha Jones MD        Or    LORazepam (ATIVAN) injection 2 mg  2 mg IntraVENous Q1H PRN Mustapha Jones MD        LORazepam (ATIVAN) injection 3 mg  3 mg IntraVENous Q15MIN PRN Mustapha Jones MD        thiamine HCL (B-1) tablet 100 mg  100 mg Oral DAILY Mustapha Jones MD   100 mg at 32/13/70 9603    folic acid (FOLVITE) tablet 1 mg  1 mg Oral DAILY Mustapha Jones MD   1 mg at 05/07/21 1145    therapeutic multivitamin (THERAGRAN) tablet 1 Tab  1 Tab Oral DAILY Mustapha Jones MD   1 Tab at 05/07/21 1145       Objective  Vitals:    05/07/21 2020 05/08/21 0019 05/08/21 0400 05/08/21 0816   BP: (!) 153/84 (!) 167/83 (!) 162/81 (!) 164/88   Pulse: 99 (!) 110 99 99   Resp: 18 18 18 18   Temp: 98.4 °F (36.9 °C) 99 °F (37.2 °C) 99 °F (37.2 °C) 99.5 °F (37.5 °C)   SpO2: 99% 99% 99% 97%   Weight:       Height:             Intake/Output Summary (Last 24 hours) at 5/8/2021 0818  Last data filed at 5/7/2021 2358  Gross per 24 hour   Intake --   Output 800 ml   Net -800 ml           Admission weight: Weight: 86.2 kg (190 lb) (05/05/21 1939)  Last Weight Metrics:  Weight Loss Metrics 5/6/2021 4/20/2021 4/7/2021 3/18/2021 3/26/2018 3/22/2018 3/19/2018   Today's Wt 271 lb 270 lb 270 lb 265 lb 273 lb 271 lb 6.4 oz 230 lb   BMI 37.8 kg/m2 39.87 kg/m2 37.66 kg/m2 36.96 kg/m2 37.55 kg/m2 37.85 kg/m2 32.08 kg/m2             Physical Assessment:     General: NAD, alert and oriented. Neck: No jvd. LUNGS: Clear to Auscultation, No rales, rhonchi or wheezes. CVS EXM: S1, S2  RRR, no murmurs/gallops/rubs. Abdomen: soft, non tender. Lower Extremities:  + edema , B/L .E Dressing       Lab    CBC w/Diff Recent Labs     05/08/21 0302 05/07/21 0306 05/05/21 2010   WBC 13.1 17.9* 18.6*   RBC 3.73* 3.89* 4.49   HGB 11.4* 12.1* 13.7   HCT 33.6* 34.2* 38.3    343 354   GRANS 81* 87* 88*   LYMPH 7* 5* 3*   EOS 1 0 1        Chemistry Recent Labs     05/08/21 0302 05/07/21 0306 05/06/21 2030 05/05/21 2010 05/05/21 2010   GLU 89 83 94   < > 168*   * 125* 126*   < > 119*   K 4.2 4.0 4.6   < > 4.0   CL 98* 94* 94*   < > 84*   CO2 26 22 25   < > 25   BUN 19* 18 20*   < > 24*   CREA 0.91 1.00 1.05   < > 1.39*   CA 8.2* 7.8* 8.1*   < > 8.8   AGAP 6 9 7   < > 10   BUCR 21* 18 19   < > 17   AP  --   --   --   --  117   TP  --   --   --   --  7.7   ALB  --   --   --   --  2.5*   GLOB  --   --   --   --  5.2*   AGRAT  --   --   --   --  0.5*    < > = values in this interval not displayed.          No results found for: IRON, FE, TIBC, IBCT, PSAT, FERR   Lab Results   Component Value Date/Time    Calcium 8.2 (L) 05/08/2021 03:02 AM          Omar Santos MD  5/8/2021  8:18 AM

## 2021-05-08 NOTE — PROGRESS NOTES
Occupational Therapy Goals  Initiated 5/8/2021 within 7 day(s). 1.  Patient will perform LB dressing with modified independence  2. Patient will maneuver on and off BSC with modified independence (once medically cleared for standing)  3. Patient will perform standing during LB clothes management with modified independence (once medically cleared for standing)    OCCUPATIONAL THERAPY EVALUATION    Patient: Briseida Raya (89 y.o. male)  Date: 5/8/2021  Primary Diagnosis: Sepsis ([facility] [address] [A41.9]  Procedure(s) (LRB):  SKIN BIOPSY OF RIGHT LOWER EXTREMITY (Right) 1 Day Post-Op   Precautions: fall; BLE's with fluid on dressing and on pillows under feet    PLOF: he reports he was independent with ADL's and IADL's as well as working loading shelves at [employer]; he was having difficulty secondary to his knee pain. He lives with his wife in a one story home with a 3 step entry. His wife has a walker, he does not. Additionally, they have a tub/shower combination. ASSESSMENT :  Based on the objective data described below, the patient presents with decreased ability to bend and reach his lower body for his LB self care skills. He is having drainage in BLE's and did not  OT session this date. Will clarify with his MD as necessary. Patient will benefit from skilled intervention to address the above impairments.   Patient's rehabilitation potential is considered to be Good  Factors which may influence rehabilitation potential include:   []             None noted  []             Mental ability/status  []             Medical condition  []             Home/family situation and support systems  []             Safety awareness  []             Pain tolerance/management  [x]             Other: LE drainage     PLAN :  Recommendations and Planned Interventions:   [x]               Self Care Training                  [x]      Therapeutic Activities  [x]               Functional Mobility Training   []      Cognitive Retraining  []               Therapeutic Exercises           []      Endurance Activities  []               Balance Training                    []      Neuromuscular Re-Education  []               Visual/Perceptual Training     []      Home Safety Training  []               Patient Education                   [x]      Family Training/Education  []               Other (comment):    Frequency/Duration: Patient will be followed by occupational therapy 1-2 times per day/4-7 days per week to address goals. Discharge Recommendations: Home Health  Further Equipment Recommendations for Discharge: N/A     SUBJECTIVE:   Patient stated I was having trouble with my knees before all of this started.  he is referring to his LE wounds and resulting drainage    OBJECTIVE DATA SUMMARY:   History reviewed. No pertinent past medical history.   Past Surgical History:   Procedure Laterality Date    HX ORTHOPAEDIC       Barriers to Learning/Limitations: none  Home Situation:   Home Situation  Home Environment: Private residence  One/Two Story Residence: One story  Living Alone: No  Support Systems: Spouse/Significant Other/Partner  Patient Expects to be Discharged to[de-identified] Private residence  Current DME Used/Available at Home: None  [x]  Right hand dominant   []  Left hand dominant    Cognitive/Behavioral Status:   He is alert, oriented X 4, pleasant and cooperative   Skin: LE dressings in place with drainage through the dressing  Edema: no UE edema noted    Vision/Perceptual:      Tracking is [facility]     Coordination: BUE   WFL    Balance:   Long sitting is WFL; standing deferred at this time    Strength: BUE  4/5   Tone & Sensation: BUE  WFL     Range of Motion: BUE  AROM WFL BUE's mild to moderate arthritic changes noted in both hands   Functional Mobility and Transfers for ADLs:  Bed Mobility:   He reports he can roll in bed by himself (not completed this date secondary to draining wounds BLE's)   ADL Assessment:    Self feeding: independent  Grooming: set up in seated position  UB bathing/dressing: independent (simulated)  LB bathing/dressing: dependent    ADL Intervention:   Adaptive equipment for LB discussed and described to him; he may benefit from a soft sock aid secondary to his LE wounds and dressings which bulk up his calves    Pain:  Pain level pre-treatment: 0/10   Pain level post-treatment: 0/10   Activity Tolerance:   He is on O2; no c/o fatigue or SOB noted during OT evaluation    Please refer to the flowsheet for vital signs taken during this treatment. After treatment:   [x] Patient left in no apparent distress sitting up in chair  [] Patient left in no apparent distress in bed  [] Call bell left within reach  [] Nursing notified  [x] huis nurse is present  [] Bed alarm activated    COMMUNICATION/EDUCATION:   [x] Role of Occupational Therapy in the acute care setting  [] Home safety education was provided and the patient/caregiver indicated understanding. [] Patient/family have participated as able in goal setting and plan of care. [] Patient/family agree to work toward stated goals and plan of care. [] Patient understands intent and goals of therapy, but is neutral about his/her participation. [] Patient is unable to participate in goal setting and plan of care. Thank you for this referral.  Rakan Rg, OTR/L  Time Calculation: 15 mins    Eval Complexity: History: LOW Complexity : Brief history review ; Examination: LOW Complexity : 1-3 performance deficits relating to physical, cognitive , or psychosocial skils that result in activity limitations and / or participation restrictions ;    Decision Making:LOW Complexity : No comorbidities that affect functional and no verbal or physical assistance needed to complete eval tasks

## 2021-05-08 NOTE — ROUTINE PROCESS
Bedside and Verbal shift change  Received from [place], [facility] (outgoing nurse), to KRISTAL Kathleen (oncoming)  Pt. Is AOX 4. IV SL, Pt. denies  pain at this time. Report included the following information SBAR, Kardex, Procedure Summary, Intake/Output, MAR, Recent Lab Results, and  Cardiac Rhythm @ SR. Will resume care and monitor Pt. Condition. Pt. Educated on call bell when in need of help and assistance. Pt. verbalized understanding. 2020 Pt. Head to toe Assessment Done and documented. 2145  PT changed pads. 2300  Pt. Resting comfortably in bed. 0000  Pt. Resting in bed, eyes closed, easily awaken. 0200  Pt. Resting with eyes closed, not in distress. 0400  Pt. Denies discomfort.    0600  Pt. Able to rest and sleep well throughout the shift. 0800 Dressing change to BLE per MD's order. Verbal and bedside Shift changed report given to Omar Alexander RN (oncoming RN) on Pt. Condition. Report consisted of patients Situation, History, Activities, intake/output,  Background, Assessment and Recommendations(SBAR). Information from the following report(s) Kardex, order Summary, Lab results and MAR was reviewed with the receiving nurse. Opportunity for questions and clarification was provided.

## 2021-05-08 NOTE — PROGRESS NOTES
Problem: Falls - Risk of  Goal: *Absence of Falls  Description: Document Rosana Butler Fall Risk and appropriate interventions in the flowsheet. Outcome: Progressing Towards Goal  Note: Fall Risk Interventions:            Medication Interventions: Assess postural VS orthostatic hypotension, Bed/chair exit alarm, Teach patient to arise slowly         History of Falls Interventions: Bed/chair exit alarm, Door open when patient unattended, Room close to nurse's station         Problem: Patient Education: Go to Patient Education Activity  Goal: Patient/Family Education  Outcome: Progressing Towards Goal     Problem: Pressure Injury - Risk of  Goal: *Prevention of pressure injury  Description: Document Xavier Scale and appropriate interventions in the flowsheet.   Outcome: Progressing Towards Goal  Note: Pressure Injury Interventions:       Moisture Interventions: Absorbent underpads, Limit adult briefs, Minimize layers, Moisture barrier    Activity Interventions: Assess need for specialty bed    Mobility Interventions: Float heels, HOB 30 degrees or less    Nutrition Interventions: Offer support with meals,snacks and hydration    Friction and Shear Interventions: Feet elevated on foot rest, Lift sheet, Minimize layers, Transferring/repositioning devices                Problem: Patient Education: Go to Patient Education Activity  Goal: Patient/Family Education  Outcome: Progressing Towards Goal     Problem: Pain  Goal: *Control of Pain  Outcome: Progressing Towards Goal  Goal: *PALLIATIVE CARE:  Alleviation of Pain  Outcome: Progressing Towards Goal     Problem: Patient Education: Go to Patient Education Activity  Goal: Patient/Family Education  Outcome: Progressing Towards Goal     Problem: Impaired Skin Integrity/Pressure Injury Treatment  Goal: *Improvement of Existing Pressure Injury  Outcome: Progressing Towards Goal  Goal: *Prevention of pressure injury  Description: Document Xavier Scale and appropriate interventions in the flowsheet.   Outcome: Progressing Towards Goal  Note: Pressure Injury Interventions:       Moisture Interventions: Absorbent underpads, Limit adult briefs, Minimize layers, Moisture barrier    Activity Interventions: Assess need for specialty bed    Mobility Interventions: Float heels, HOB 30 degrees or less    Nutrition Interventions: Offer support with meals,snacks and hydration    Friction and Shear Interventions: Feet elevated on foot rest, Lift sheet, Minimize layers, Transferring/repositioning devices                Problem: Patient Education: Go to Patient Education Activity  Goal: Patient/Family Education  Outcome: Progressing Towards Goal     Problem: Hypertension  Goal: *Blood pressure within specified parameters  Outcome: Progressing Towards Goal  Goal: *Fluid volume balance  Outcome: Progressing Towards Goal  Goal: *Labs within defined limits  Outcome: Progressing Towards Goal     Problem: Patient Education: Go to Patient Education Activity  Goal: Patient/Family Education  Outcome: Progressing Towards Goal     Problem: Nutrition Deficit  Goal: *Optimize nutritional status  Outcome: Progressing Towards Goal

## 2021-05-08 NOTE — PROGRESS NOTES
Patient seen and examined. His right leg wound at the site of the biopsy from yesterday is clean. We will sign off for now but please call for any questions.

## 2021-05-09 LAB
ANION GAP SERPL CALC-SCNC: 7 MMOL/L (ref 3–18)
BASOPHILS # BLD: 0.2 K/UL (ref 0–0.1)
BASOPHILS NFR BLD: 2 % (ref 0–2)
BUN SERPL-MCNC: 24 MG/DL (ref 7–18)
BUN/CREAT SERPL: 24 (ref 12–20)
C3 SERPL-MCNC: 150 MG/DL (ref 82–167)
C4 SERPL-MCNC: 26 MG/DL (ref 12–38)
CALCIUM SERPL-MCNC: 7.5 MG/DL (ref 8.5–10.1)
CHLORIDE SERPL-SCNC: 100 MMOL/L (ref 100–111)
CO2 SERPL-SCNC: 24 MMOL/L (ref 21–32)
CREAT SERPL-MCNC: 1.02 MG/DL (ref 0.6–1.3)
DIFFERENTIAL METHOD BLD: ABNORMAL
EOSINOPHIL # BLD: 0 K/UL (ref 0–0.4)
EOSINOPHIL NFR BLD: 0 % (ref 0–5)
ERYTHROCYTE [DISTWIDTH] IN BLOOD BY AUTOMATED COUNT: 13.3 % (ref 11.6–14.5)
GLUCOSE SERPL-MCNC: 119 MG/DL (ref 74–99)
HCT VFR BLD AUTO: 32.2 % (ref 36–48)
HGB BLD-MCNC: 10.8 G/DL (ref 13–16)
LYMPHOCYTES # BLD: 1.2 K/UL (ref 0.9–3.6)
LYMPHOCYTES NFR BLD: 10 % (ref 21–52)
MAGNESIUM SERPL-MCNC: 1.8 MG/DL (ref 1.6–2.6)
MCH RBC QN AUTO: 31.1 PG (ref 24–34)
MCHC RBC AUTO-ENTMCNC: 33.5 G/DL (ref 31–37)
MCV RBC AUTO: 92.8 FL (ref 74–97)
MONOCYTES # BLD: 1.3 K/UL (ref 0.05–1.2)
MONOCYTES NFR BLD: 11 % (ref 3–10)
NEUTS BAND NFR BLD MANUAL: 1 % (ref 0–5)
NEUTS SEG # BLD: 8.8 K/UL (ref 1.8–8)
NEUTS SEG NFR BLD: 76 % (ref 40–73)
PHOSPHATE SERPL-MCNC: 3.8 MG/DL (ref 2.5–4.9)
PLATELET # BLD AUTO: 381 K/UL (ref 135–420)
PLATELET COMMENTS,PCOM: ABNORMAL
PMV BLD AUTO: 9 FL (ref 9.2–11.8)
POTASSIUM SERPL-SCNC: 4.3 MMOL/L (ref 3.5–5.5)
RBC # BLD AUTO: 3.47 M/UL (ref 4.35–5.65)
RBC MORPH BLD: ABNORMAL
SODIUM SERPL-SCNC: 131 MMOL/L (ref 136–145)
WBC # BLD AUTO: 11.5 K/UL (ref 4.6–13.2)

## 2021-05-09 PROCEDURE — 11404 EXC TR-EXT B9+MARG 3.1-4 CM: CPT | Performed by: SURGERY

## 2021-05-09 PROCEDURE — 36415 COLL VENOUS BLD VENIPUNCTURE: CPT

## 2021-05-09 PROCEDURE — 74011250637 HC RX REV CODE- 250/637: Performed by: FAMILY MEDICINE

## 2021-05-09 PROCEDURE — 74011000258 HC RX REV CODE- 258: Performed by: PHYSICIAN ASSISTANT

## 2021-05-09 PROCEDURE — 84100 ASSAY OF PHOSPHORUS: CPT

## 2021-05-09 PROCEDURE — 99232 SBSQ HOSP IP/OBS MODERATE 35: CPT | Performed by: INTERNAL MEDICINE

## 2021-05-09 PROCEDURE — 74011000258 HC RX REV CODE- 258: Performed by: HOSPITALIST

## 2021-05-09 PROCEDURE — 77010033678 HC OXYGEN DAILY

## 2021-05-09 PROCEDURE — 80048 BASIC METABOLIC PNL TOTAL CA: CPT

## 2021-05-09 PROCEDURE — 74011250636 HC RX REV CODE- 250/636: Performed by: FAMILY MEDICINE

## 2021-05-09 PROCEDURE — 74011250636 HC RX REV CODE- 250/636: Performed by: HOSPITALIST

## 2021-05-09 PROCEDURE — 74011250637 HC RX REV CODE- 250/637: Performed by: PHYSICIAN ASSISTANT

## 2021-05-09 PROCEDURE — 74011250637 HC RX REV CODE- 250/637: Performed by: HOSPITALIST

## 2021-05-09 PROCEDURE — 2709999900 HC NON-CHARGEABLE SUPPLY

## 2021-05-09 PROCEDURE — 83735 ASSAY OF MAGNESIUM: CPT

## 2021-05-09 PROCEDURE — 65270000029 HC RM PRIVATE

## 2021-05-09 PROCEDURE — 97162 PT EVAL MOD COMPLEX 30 MIN: CPT

## 2021-05-09 PROCEDURE — 74011250636 HC RX REV CODE- 250/636: Performed by: EMERGENCY MEDICINE

## 2021-05-09 PROCEDURE — 74011250636 HC RX REV CODE- 250/636: Performed by: SPECIALIST

## 2021-05-09 PROCEDURE — 85025 COMPLETE CBC W/AUTO DIFF WBC: CPT

## 2021-05-09 PROCEDURE — 74011000250 HC RX REV CODE- 250: Performed by: EMERGENCY MEDICINE

## 2021-05-09 PROCEDURE — 99232 SBSQ HOSP IP/OBS MODERATE 35: CPT | Performed by: HOSPITALIST

## 2021-05-09 PROCEDURE — 74011250636 HC RX REV CODE- 250/636: Performed by: PHYSICIAN ASSISTANT

## 2021-05-09 RX ORDER — METOPROLOL TARTRATE 50 MG/1
50 TABLET ORAL EVERY 12 HOURS
Status: DISCONTINUED | OUTPATIENT
Start: 2021-05-09 | End: 2021-05-10

## 2021-05-09 RX ADMIN — VANCOMYCIN HYDROCHLORIDE 1500 MG: 10 INJECTION, POWDER, LYOPHILIZED, FOR SOLUTION INTRAVENOUS at 12:06

## 2021-05-09 RX ADMIN — SODIUM CHLORIDE 75 ML/HR: 900 INJECTION, SOLUTION INTRAVENOUS at 20:05

## 2021-05-09 RX ADMIN — WATER 2 G: 1 INJECTION INTRAMUSCULAR; INTRAVENOUS; SUBCUTANEOUS at 09:32

## 2021-05-09 RX ADMIN — ENOXAPARIN SODIUM 40 MG: 40 INJECTION SUBCUTANEOUS at 09:32

## 2021-05-09 RX ADMIN — MORPHINE SULFATE 2 MG: 2 INJECTION, SOLUTION INTRAMUSCULAR; INTRAVENOUS at 12:13

## 2021-05-09 RX ADMIN — SODIUM CHLORIDE 5 MG/HR: 900 INJECTION, SOLUTION INTRAVENOUS at 12:13

## 2021-05-09 RX ADMIN — THERA TABS 1 TABLET: TAB at 09:32

## 2021-05-09 RX ADMIN — Medication 10 ML: at 22:28

## 2021-05-09 RX ADMIN — SODIUM CHLORIDE 75 ML/HR: 900 INJECTION, SOLUTION INTRAVENOUS at 06:00

## 2021-05-09 RX ADMIN — HYDROCODONE BITARTRATE AND ACETAMINOPHEN 2 TABLET: 5; 325 TABLET ORAL at 09:32

## 2021-05-09 RX ADMIN — HYDRALAZINE HYDROCHLORIDE 10 MG: 20 INJECTION, SOLUTION INTRAMUSCULAR; INTRAVENOUS at 15:01

## 2021-05-09 RX ADMIN — DIPHENHYDRAMINE HYDROCHLORIDE 50 MG: 25 CAPSULE ORAL at 02:42

## 2021-05-09 RX ADMIN — Medication 10 ML: at 14:54

## 2021-05-09 RX ADMIN — Medication 10 ML: at 14:55

## 2021-05-09 RX ADMIN — WATER 2 G: 1 INJECTION INTRAMUSCULAR; INTRAVENOUS; SUBCUTANEOUS at 20:04

## 2021-05-09 RX ADMIN — METOPROLOL TARTRATE 50 MG: 50 TABLET, FILM COATED ORAL at 22:27

## 2021-05-09 RX ADMIN — Medication 10 ML: at 05:59

## 2021-05-09 RX ADMIN — METOPROLOL TARTRATE 50 MG: 50 TABLET, FILM COATED ORAL at 10:03

## 2021-05-09 RX ADMIN — HYDROCODONE BITARTRATE AND ACETAMINOPHEN 2 TABLET: 5; 325 TABLET ORAL at 02:42

## 2021-05-09 RX ADMIN — ASPIRIN 81 MG: 81 TABLET, COATED ORAL at 10:09

## 2021-05-09 RX ADMIN — FOLIC ACID 1 MG: 1 TABLET ORAL at 09:32

## 2021-05-09 RX ADMIN — HYDROCODONE BITARTRATE AND ACETAMINOPHEN 2 TABLET: 5; 325 TABLET ORAL at 14:54

## 2021-05-09 RX ADMIN — Medication 100 MG: at 09:32

## 2021-05-09 RX ADMIN — SODIUM CHLORIDE 10 MG/HR: 900 INJECTION, SOLUTION INTRAVENOUS at 01:30

## 2021-05-09 RX ADMIN — WATER 2 G: 1 INJECTION INTRAMUSCULAR; INTRAVENOUS; SUBCUTANEOUS at 01:27

## 2021-05-09 NOTE — PROGRESS NOTES
Na is up to 131 , patient is clinically stable   Continue with IVF , improve Nutrition   Will see back Tomorrow

## 2021-05-09 NOTE — PROGRESS NOTES
Problem: Mobility Impaired (Adult and Pediatric)  Goal: *Acute Goals and Plan of Care (Insert Text)  Description: Physical Therapy Goals  Initiated 5/9/2021 and to be accomplished within 7 day(s)  1. Patient will move from supine to sit and sit to supine in bed with modified independence. 2.  Patient will transfer from bed to chair and chair to bed with modified independence using the least restrictive device. 3.  Patient will perform sit to stand with modified independence. 4.  Patient will ambulate with modified independence for 50 feet with the least restrictive device. PLOF: Independent    Outcome: Progressing Towards Goal   PHYSICAL THERAPY EVALUATION    Patient: Dyana Martin (89 y.o. male)  Date: 5/9/2021  Primary Diagnosis: Sepsis ([facility] [address] [A41.9]  Procedure(s) (LRB):  SKIN BIOPSY OF RIGHT LOWER EXTREMITY (Right) 2 Days Post-Op   Precautions: Aspiration  ASSESSMENT :  Agreeable to PT with encouragement. Independent prior to admission. Following with outpatient ortho for chronic left knee pain. AROM BLE WFL. BLE strength 4+/5. Supervision for supine to sit. Seated EOB with good balance. Supervision for sit to stand. Unable to tolerate full extension of legs in standing d/t BLE pain 4/10. Returned to seated, supine with supervision. BLE elevated for comfort. BLE wounds weeping with soiled dressings; report RN aware. Encouraged EOB/OOB for meals and toileting; verbalized understanding. Educated on need for RN assistance with mobility d/t lines; verbalized understanding. Call bell in reach. Patient will benefit from skilled intervention to address the above impairments.   Patient's rehabilitation potential is considered to be Fair  Factors which may influence rehabilitation potential include:   []         None noted  []         Mental ability/status  [x]         Medical condition  []         Home/family situation and support systems  []         Safety awareness  []         Pain tolerance/management  []         Other:      PLAN :  Recommendations and Planned Interventions:   [x]           Bed Mobility Training             [x]    Neuromuscular Re-Education  [x]           Transfer Training                   []    Orthotic/Prosthetic Training  [x]           Gait Training                          []    Modalities  [x]           Therapeutic Exercises           []    Edema Management/Control  [x]           Therapeutic Activities            [x]    Family Training/Education  [x]           Patient Education  []           Other (comment):    Frequency/Duration: Patient will be followed by physical therapy 1-2 times per day/4-7 days per week to address goals. 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     OBJECTIVE DATA SUMMARY:   History reviewed. No pertinent past medical history.   Past Surgical History:   Procedure Laterality Date    HX ORTHOPAEDIC       Barriers to Learning/Limitations: None  Compensate with: Visual Cues, Verbal Cues, Tactile Cues and Kinesthetic Cues    Home Situation:  Home Situation  Home Environment: Private residence  One/Two Story Residence: One story  Living Alone: No  Support Systems: Spouse/Significant Other/Partner  Patient Expects to be Discharged to[de-identified] Private residence  Current DME Used/Available at Home: None    Critical Behavior:  Neurologic State: Alert  Orientation Level: Oriented X4  Cognition: Follows commands     Strength:    Manual Muscle Testing (LE)         R     L    Hip Flexion:   4+/5  4+/5  Knee EXT:   4+/5  4+/5  Knee FLEX:   4+/5  4+/5  Ankle DF:   4+/5  4+/5  _________________________________________________   Range Of Motion:  BLE AROM WFL  Functional Mobility:  Bed Mobility:  Supine to Sit: Supervision  Sit to Supine: Supervision  Transfers:  Sit to Stand: Supervision  Stand to Sit: Supervision  Balance:   Sitting: Intact  Standing: Impaired  Standing - Static: Fair  Neuro Re-education:  Seated balance 3 minutes  Standing 30 minutes    Pain: pain only with BLE in dependent position 4/10  Pain level pre-treatment: 0/10   Pain level post-treatment: 0/10     Activity Tolerance:   Fair    After treatment:   []         Patient left in no apparent distress sitting up in chair  [x]         Patient left in no apparent distress in bed  [x]         Call bell left within reach  [x]         Nursing notified  []         Caregiver present  []         Bed alarm activated  []         SCDs applied    COMMUNICATION/EDUCATION:   [x]         Role of physical therapy and plan of care in the acute care setting. [x]         Fall prevention education was provided and the patient/caregiver indicated understanding. [x]         Patient/family have participated as able in goal setting and plan of care. []         Patient/family agree to work toward stated goals and plan of care. []         Patient understands intent and goals of therapy, but is neutral about his/her participation. []         Patient is unable to participate in goal setting/plan of care: ongoing with therapy staff.     Thank you for this referral.  Yary Kumar, PT   Time Calculation: 10 mins    Eval Complexity: History: MEDIUM  Complexity : 1-2 comorbidities / personal factors will impact the outcome/ POC Exam:MEDIUM Complexity : 3 Standardized tests and measures addressing body structure, function, activity limitation and / or participation in recreation  Presentation: MEDIUM Complexity : Evolving with changing characteristics  Clinical Decision Making:Medium Complexity   Clinical judgement; ROM, MMT, functional mobility Overall Complexity:MEDIUM

## 2021-05-09 NOTE — PROGRESS NOTES
Cardiology Progress Note    Admit Date: 5/5/2021  Attending Cardiologist: Dr. Deanna Jaffe:     -Sepsis associated with bilateral lower extremity cellulitis. Tmax 101.9F. S/p skin biopsy by Dr. Diallo Magaña 5/7/2021.  -Paroxysmal atrial flutter with rapid ventricular response in setting of above, converted to SR 5/8/2021 PM.  -Echocardiogram 5/6/2021: EF 55-60%, LV with normal cavity size, wall thickness and wall motion  -Elevated BP without Hx HTN  -EtOH use     No primary cardiologist        Atrial fibrillation CHADSVASC2 score stroke risk:   62 y.o.                                       <65        + 0   male                                        Male     [de-identified]  CHF hx                                          No    + 0  HTN hx                                          Yes    +1  Stroke/TIA/Thromboembolism       No    +0  Vascular disease hx                      No    + 0  Diabetes Mellitus                           No    + 0   CHADSVASC2 score                  1      Annual Stroke Risk 0.6% - low-moderate risk     Plan:     -Telemetry reviewed, pt converted to sinus rhythm at ~16:30 yesterday afternoon. Weaning off Cardizem infusion today, d/c order rhythm remains sin sinus in 80's. Continue PO Lopressor 50 mg BID. -Continue ASA 81 mg.  -Antibiotics per primary team.    Subjective:     No new complaints.      Objective:      Patient Vitals for the past 8 hrs:   Temp Pulse Resp BP SpO2   05/09/21 0833 98.7 °F (37.1 °C) 90 20 (!) 165/87 96 %   05/09/21 0400 98.4 °F (36.9 °C) 75 18 (!) 148/80 98 %         Patient Vitals for the past 96 hrs:   Weight   05/06/21 1538 122.9 kg (271 lb)   05/06/21 0921 86.2 kg (190 lb)   05/05/21 1939 86.2 kg (190 lb)       TELE: normal sinus rhythm               Current Facility-Administered Medications   Medication Dose Route Frequency Last Admin    metoprolol tartrate (LOPRESSOR) tablet 50 mg  50 mg Oral Q12H      0.9% sodium chloride infusion  75 mL/hr IntraVENous CONTINUOUS 75 mL/hr at 05/09/21 0600    dilTIAZem (CARDIZEM) 100 mg in 0.9% sodium chloride (MBP/ADV) 100 mL infusion  10 mg/hr IntraVENous CONTINUOUS 10 mg/hr at 05/09/21 0130    morphine injection 2 mg  2 mg IntraVENous Q4H PRN      HYDROcodone-acetaminophen (NORCO) 5-325 mg per tablet 2 Tab  2 Tab Oral Q6H PRN 2 Tab at 05/09/21 0932    aspirin delayed-release tablet 81 mg  81 mg Oral DAILY      vancomycin (VANCOCIN) 1500 mg in  ml infusion  1,500 mg IntraVENous Q12H 1,500 mg at 05/08/21 2326    cefepime (MAXIPIME) 2 g in sterile water (preservative free) 10 mL IV syringe  2 g IntraVENous Q8H 2 g at 05/09/21 0932    sodium chloride (NS) flush 5-40 mL  5-40 mL IntraVENous Q8H 10 mL at 05/09/21 0559    sodium chloride (NS) flush 5-40 mL  5-40 mL IntraVENous PRN      acetaminophen (TYLENOL) tablet 650 mg  650 mg Oral Q6H  mg at 05/06/21 2228    Or    acetaminophen (TYLENOL) suppository 650 mg  650 mg Rectal Q6H  mg at 05/07/21 0558    polyethylene glycol (MIRALAX) packet 17 g  17 g Oral DAILY PRN      promethazine (PHENERGAN) tablet 12.5 mg  12.5 mg Oral Q6H PRN      Or    ondansetron (ZOFRAN) injection 4 mg  4 mg IntraVENous Q6H PRN      enoxaparin (LOVENOX) injection 40 mg  40 mg SubCUTAneous DAILY 40 mg at 05/09/21 0932    diphenhydrAMINE (BENADRYL) capsule 50 mg  50 mg Oral Q6H PRN 50 mg at 05/09/21 0242    hydrALAZINE (APRESOLINE) 20 mg/mL injection 10 mg  10 mg IntraVENous Q6H PRN 10 mg at 05/06/21 2228    sodium chloride (NS) flush 5-40 mL  5-40 mL IntraVENous Q8H 10 mL at 05/08/21 2139    sodium chloride (NS) flush 5-40 mL  5-40 mL IntraVENous PRN      LORazepam (ATIVAN) tablet 1 mg  1 mg Oral Q1H PRN      Or    LORazepam (ATIVAN) injection 1 mg  1 mg IntraVENous Q1H PRN      LORazepam (ATIVAN) tablet 2 mg  2 mg Oral Q1H PRN 2 mg at 05/08/21 2130    Or    LORazepam (ATIVAN) injection 2 mg  2 mg IntraVENous Q1H PRN      LORazepam (ATIVAN) injection 3 mg  3 mg IntraVENous Q15MIN PRN      thiamine HCL (B-1) tablet 100 mg  100 mg Oral DAILY 100 mg at 71/45/96 0496    folic acid (FOLVITE) tablet 1 mg  1 mg Oral DAILY 1 mg at 05/09/21 0932    therapeutic multivitamin (THERAGRAN) tablet 1 Tab  1 Tab Oral DAILY 1 Tab at 05/09/21 0932         Intake/Output Summary (Last 24 hours) at 5/9/2021 0954  Last data filed at 5/9/2021 0600  Gross per 24 hour   Intake 4639.83 ml   Output 1850 ml   Net 2789.83 ml       Physical Exam:  General:  alert, cooperative, no distress, appears stated age  Neck:  supple  Lungs:  clear to auscultation bilaterally to anterolateral lung fields  Heart:  regular rate and rhythm  Abdomen:  abdomen is soft without significant tenderness, masses, organomegaly or guarding  Extremities:  Bilateral lower extremities with ulceration and cellulitis of lower legs    Visit Vitals  BP (!) 165/87 (BP 1 Location: Right upper arm, BP Patient Position: At rest)   Pulse 90   Temp 98.7 °F (37.1 °C)   Resp 20   Ht 5' 11\" (1.803 m)   Wt 122.9 kg (271 lb)   SpO2 96%   BMI 37.80 kg/m²       Data Review:     Labs: Results:       Chemistry Recent Labs     05/09/21 0221 05/08/21 0302 05/07/21  0306   * 89 83   * 130* 125*   K 4.3 4.2 4.0    98* 94*   CO2 24 26 22   BUN 24* 19* 18   CREA 1.02 0.91 1.00   CA 7.5* 8.2* 7.8*   MG 1.8 2.2 1.9   PHOS 3.8  --   --    AGAP 7 6 9   BUCR 24* 21* 18      CBC w/Diff Recent Labs     05/09/21 0221 05/08/21 0302 05/07/21  0306   WBC 11.5 13.1 17.9*   RBC 3.47* 3.73* 3.89*   HGB 10.8* 11.4* 12.1*   HCT 32.2* 33.6* 34.2*    347 343   GRANS 76* 81* 87*   LYMPH 10* 7* 5*   EOS 0 1 0      Cardiac Enzymes Lab Results   Component Value Date/Time    CPK 83 05/08/2021 12:09 PM    CKMB 1.4 05/08/2021 12:09 PM    CKND1 1.7 05/08/2021 12:09 PM    TROIQ <0.02 05/08/2021 12:09 PM      Coagulation Recent Labs     05/07/21  0306   PTP 15.2   INR 1.2       Lipid Panel Lab Results   Component Value Date/Time    Cholesterol, total 96 05/07/2021 03:06 AM    HDL Cholesterol 36 (L) 05/07/2021 03:06 AM    LDL, calculated 49 05/07/2021 03:06 AM    VLDL, calculated 11 05/07/2021 03:06 AM    Triglyceride 55 05/07/2021 03:06 AM    CHOL/HDL Ratio 2.7 05/07/2021 03:06 AM      Thyroid Studies Lab Results   Component Value Date/Time    TSH 1.28 05/07/2021 03:06 AM          Signed By: Sterling Sage PA-C     May 9, 2021

## 2021-05-09 NOTE — PROGRESS NOTES
[facility]  Progress Note    Patient: Isabel Sherman Age: 62 y.o. : 1960 MR#: [id number] SSN: [id number]  Date/Time: 2021    Subjective: Patient feels fine, no complaints. Pain worse during the dressing change and movement. No palpitations, no chest pain. Assessment/Plan:     1. A Flutter RVR, converted to sinus last evening    2. Bilateral lower extremity cellulitis  3. SEPSIS with tachy and leukocytosis due to # 2, POA  4. Hypertension, BP elevated  5. Hyponatremia  6. Arthritis  7. Alcoholism  8. Tobacco abuse    Plan  1. Will wean off IV Cardizem drip. Increase metoprolol, cardiology input noted. Discussed with Adarsh Fairbanks.   2. Pending results of skin biopsy. 3. Continue antibiotics with vancomycin and cefepime. Follow cultures. 4. Currently not showing signs of alcohol withdrawal  5. Continue thiamine  6. Continue pain medicines  7. Continue normal saline. Monitor sodium. Nephrology is following. 8. Vascular surgery input noted. Discussed with vascular surgery, no intervention from their standpoint unless patient needs debridement in the near future. 9. Continue PT OT  Discussed with RN to wean off of Cardizem drip. Discussed with the patient and also with his wife over the phone and explained about my above plan of care. Both of them understood and agreed with my plan.     Case discussed with:  [x]Patient  [x]Family  [x]Nursing  []Case Management  DVT Prophylaxis:  [x]Lovenox  []Hep SQ  []SCDs  []Coumadin   []On Heparin gtt    Objective:   VS:   Visit Vitals  BP (!) 165/87 (BP 1 Location: Right upper arm, BP Patient Position: At rest)   Pulse 90   Temp 98.7 °F (37.1 °C)   Resp 20   Ht 5' 11\" (1.803 m)   Wt 122.9 kg (271 lb)   SpO2 96%   BMI 37.80 kg/m²      Tmax/24hrs: Temp (24hrs), Av.4 °F (36.9 °C), Min:97.9 °F (36.6 °C), Max:98.9 °F (37.2 °C)    Input/Output:     Intake/Output Summary (Last 24 hours) at 2021 1104  Last data filed at 5/9/2021 0600  Gross per 24 hour   Intake 4639.83 ml   Output 1850 ml   Net 2789.83 ml       General: Alert awake, no acute distress. Cardiovascular: regular rate and rhythm  Pulmonary:  CTA b/l  GI:  Soft, BS+, NT, ND  Extremities: Bilateral lower extremity extensive redness erythema and multiple areas of skin necrosis. Redness erythema slightly better, swelling improving, good ROM in ankle and knees. Alert awake on x3, moves all extremities.     Labs:    Recent Results (from the past 24 hour(s))   VANCOMYCIN, TROUGH    Collection Time: 05/08/21 12:09 PM   Result Value Ref Range    Vancomycin,trough 15.0 10.0 - 20.0 ug/mL    Reported dose date 20210508      Reported dose time: 0000      Reported dose: 1000 MG UNITS   CARDIAC PANEL,(CK, CKMB & TROPONIN)    Collection Time: 05/08/21 12:09 PM   Result Value Ref Range    CK - MB 1.4 <3.6 ng/ml    CK-MB Index 1.7 0.0 - 4.0 %    CK 83 39 - 308 U/L    Troponin-I, QT <0.02 0.0 - 0.045 NG/ML   EKG, 12 LEAD, INITIAL    Collection Time: 05/08/21 12:51 PM   Result Value Ref Range    Ventricular Rate 163 BPM    Atrial Rate 359 BPM    QRS Duration 86 ms    Q-T Interval 174 ms    QTC Calculation (Bezet) 286 ms    Calculated P Axis -106 degrees    Calculated R Axis 44 degrees    Calculated T Axis -78 degrees    Diagnosis       Atrial flutter with variable AV block  Nonspecific T wave abnormality  Abnormal ECG  When compared with ECG of 08-MAY-2021 12:50,  Atrial flutter is now present  Confirmed by Lacy Samayoa MD, Ashleigh Aid ([id number]) on 5/8/2021 0:54:94 PM     METABOLIC PANEL, BASIC    Collection Time: 05/09/21  2:21 AM   Result Value Ref Range    Sodium 131 (L) 136 - 145 mmol/L    Potassium 4.3 3.5 - 5.5 mmol/L    Chloride 100 100 - 111 mmol/L    CO2 24 21 - 32 mmol/L    Anion gap 7 3.0 - 18 mmol/L    Glucose 119 (H) 74 - 99 mg/dL    BUN 24 (H) 7.0 - 18 MG/DL    Creatinine 1.02 0.6 - 1.3 MG/DL    BUN/Creatinine ratio 24 (H) 12 - 20      GFR est AA >60 >60 ml/min/1.73m2    GFR est non-AA >60 >60 ml/min/1.73m2    Calcium 7.5 (L) 8.5 - 10.1 MG/DL   CBC WITH AUTOMATED DIFF    Collection Time: 05/09/21  2:21 AM   Result Value Ref Range    WBC 11.5 4.6 - 13.2 K/uL    RBC 3.47 (L) 4.35 - 5.65 M/uL    HGB 10.8 (L) 13.0 - 16.0 g/dL    HCT 32.2 (L) 36.0 - 48.0 %    MCV 92.8 74.0 - 97.0 FL    MCH 31.1 24.0 - 34.0 PG    MCHC 33.5 31.0 - 37.0 g/dL    RDW 13.3 11.6 - 14.5 %    PLATELET 883 920 - 148 K/uL    MPV 9.0 (L) 9.2 - 11.8 FL    NEUTROPHILS 76 (H) 40 - 73 %    BAND NEUTROPHILS 1 0 - 5 %    LYMPHOCYTES 10 (L) 21 - 52 %    MONOCYTES 11 (H) 3 - 10 %    EOSINOPHILS 0 0 - 5 %    BASOPHILS 2 0 - 2 %    ABS. NEUTROPHILS 8.8 (H) 1.8 - 8.0 K/UL    ABS. LYMPHOCYTES 1.2 0.9 - 3.6 K/UL    ABS. MONOCYTES 1.3 (H) 0.05 - 1.2 K/UL    ABS. EOSINOPHILS 0.0 0.0 - 0.4 K/UL    ABS.  BASOPHILS 0.2 (H) 0.0 - 0.1 K/UL    DF MANUAL      PLATELET COMMENTS ADEQUATE PLATELETS      RBC COMMENTS NORMOCYTIC, NORMOCHROMIC     PHOSPHORUS    Collection Time: 05/09/21  2:21 AM   Result Value Ref Range    Phosphorus 3.8 2.5 - 4.9 MG/DL   MAGNESIUM    Collection Time: 05/09/21  2:21 AM   Result Value Ref Range    Magnesium 1.8 1.6 - 2.6 mg/dL     Additional Data Reviewed:      Signed By: Elva Norman MD     May 9, 2021

## 2021-05-09 NOTE — OP NOTES
[address] [place] [address]   OPERATIVE REPORT    Name:  Radha Santana  MR#:   [id number]  :  1960  ACCOUNT #:  [de-identified]  DATE OF SERVICE:  2021    PREOPERATIVE DIAGNOSIS:  Bilateral lower extremity infection. POSTOPERATIVE DIAGNOSIS:  Bilateral lower extremity infection. PROCEDURE PERFORMED:  Biopsy of the skin and subcutaneous tissue of the right lower extremity x3. SURGEON:  Wynelle Dance. MD Dinorah Little. ANESTHESIA:  General.    COMPLICATIONS:  None. SPECIMENS REMOVED:  Three-biopsy of the right lower extremity including skin and subcutaneous and part of fat. IMPLANTS:  None. ESTIMATED BLOOD LOSS:  Minimal.    DETAIL OF PROCEDURE:  The patient was brought to the operating room. Anesthesia was induced. Scrubbing and draping of the right lower extremity was done in the usual manner. A time-out was performed. We infiltrated the area with 1% lidocaine and then an elliptical skin incision along the length of the leg at an area of skin necrosis and normal skin was divided. It was about 3 cm long x 3-4 mm wide and the depth was all the way to the fat. So it was including skin, subcutaneous tissue, and some fat, and then this was divided with cautery, and it was cut into three pieces and sent for pathology per ID recommendation. Hemostasis was secured with gentle cautery and then we used 4-0 Monocryl suture to close the skin and then we applied some ointment on top of it.       Jorge Heredia MD      [id number]  D:  2021 3:52  T:  2021 5:38  JOB #:  [id number]

## 2021-05-10 LAB
ANA TITR SER IF: NEGATIVE {TITER}
ANION GAP SERPL CALC-SCNC: 6 MMOL/L (ref 3–18)
ATRIAL RATE: 97 BPM
BASOPHILS # BLD: 0.4 K/UL (ref 0–0.1)
BASOPHILS NFR BLD: 3 % (ref 0–2)
BUN SERPL-MCNC: 21 MG/DL (ref 7–18)
BUN/CREAT SERPL: 19 (ref 12–20)
CALCIUM SERPL-MCNC: 7.7 MG/DL (ref 8.5–10.1)
CALCULATED P AXIS, ECG09: 48 DEGREES
CALCULATED R AXIS, ECG10: 47 DEGREES
CALCULATED T AXIS, ECG11: 72 DEGREES
CHLORIDE SERPL-SCNC: 99 MMOL/L (ref 100–111)
CO2 SERPL-SCNC: 25 MMOL/L (ref 21–32)
CREAT SERPL-MCNC: 1.08 MG/DL (ref 0.6–1.3)
DIAGNOSIS, 93000: NORMAL
DIFFERENTIAL METHOD BLD: ABNORMAL
EOSINOPHIL # BLD: 0.1 K/UL (ref 0–0.4)
EOSINOPHIL NFR BLD: 1 % (ref 0–5)
ERYTHROCYTE [DISTWIDTH] IN BLOOD BY AUTOMATED COUNT: 13.4 % (ref 11.6–14.5)
GLUCOSE SERPL-MCNC: 106 MG/DL (ref 74–99)
HBV SURFACE AG SER QL: <0.1 INDEX
HBV SURFACE AG SER QL: NEGATIVE
HCT VFR BLD AUTO: 33.6 % (ref 36–48)
HCV AB SER IA-ACNC: 0.17 INDEX
HCV AB SERPL QL IA: NEGATIVE
HCV COMMENT,HCGAC: NORMAL
HGB BLD-MCNC: 10.9 G/DL (ref 13–16)
LYMPHOCYTES # BLD: 0.6 K/UL (ref 0.9–3.6)
LYMPHOCYTES NFR BLD: 5 % (ref 21–52)
MAGNESIUM SERPL-MCNC: 1.8 MG/DL (ref 1.6–2.6)
MCH RBC QN AUTO: 30.3 PG (ref 24–34)
MCHC RBC AUTO-ENTMCNC: 32.4 G/DL (ref 31–37)
MCV RBC AUTO: 93.3 FL (ref 74–97)
MONOCYTES # BLD: 0.5 K/UL (ref 0.05–1.2)
MONOCYTES NFR BLD: 4 % (ref 3–10)
NEUTS BAND NFR BLD MANUAL: 2 % (ref 0–5)
NEUTS SEG # BLD: 10.4 K/UL (ref 1.8–8)
NEUTS SEG NFR BLD: 85 % (ref 40–73)
P-R INTERVAL, ECG05: 158 MS
PHOSPHATE SERPL-MCNC: 3.5 MG/DL (ref 2.5–4.9)
PLATELET # BLD AUTO: 406 K/UL (ref 135–420)
PLATELET COMMENTS,PCOM: ABNORMAL
PLEASE NOTE, 734348: NORMAL
PMV BLD AUTO: 8.8 FL (ref 9.2–11.8)
POTASSIUM SERPL-SCNC: 4.6 MMOL/L (ref 3.5–5.5)
Q-T INTERVAL, ECG07: 338 MS
QRS DURATION, ECG06: 92 MS
QTC CALCULATION (BEZET), ECG08: 429 MS
RBC # BLD AUTO: 3.6 M/UL (ref 4.35–5.65)
RBC MORPH BLD: ABNORMAL
SODIUM SERPL-SCNC: 130 MMOL/L (ref 136–145)
VENTRICULAR RATE, ECG03: 97 BPM
WBC # BLD AUTO: 12 K/UL (ref 4.6–13.2)

## 2021-05-10 PROCEDURE — 36415 COLL VENOUS BLD VENIPUNCTURE: CPT

## 2021-05-10 PROCEDURE — 99232 SBSQ HOSP IP/OBS MODERATE 35: CPT | Performed by: INTERNAL MEDICINE

## 2021-05-10 PROCEDURE — 2709999900 HC NON-CHARGEABLE SUPPLY

## 2021-05-10 PROCEDURE — 74011250636 HC RX REV CODE- 250/636: Performed by: FAMILY MEDICINE

## 2021-05-10 PROCEDURE — 74011250637 HC RX REV CODE- 250/637: Performed by: HOSPITALIST

## 2021-05-10 PROCEDURE — 74011250636 HC RX REV CODE- 250/636: Performed by: EMERGENCY MEDICINE

## 2021-05-10 PROCEDURE — 74011000250 HC RX REV CODE- 250: Performed by: EMERGENCY MEDICINE

## 2021-05-10 PROCEDURE — 74011250637 HC RX REV CODE- 250/637: Performed by: PHYSICIAN ASSISTANT

## 2021-05-10 PROCEDURE — 74011250636 HC RX REV CODE- 250/636: Performed by: INTERNAL MEDICINE

## 2021-05-10 PROCEDURE — 77030041076 HC DRSG AG OPTICELL MDII -A

## 2021-05-10 PROCEDURE — 74011000258 HC RX REV CODE- 258: Performed by: INTERNAL MEDICINE

## 2021-05-10 PROCEDURE — 85025 COMPLETE CBC W/AUTO DIFF WBC: CPT

## 2021-05-10 PROCEDURE — 74011250636 HC RX REV CODE- 250/636: Performed by: HOSPITALIST

## 2021-05-10 PROCEDURE — 65270000029 HC RM PRIVATE

## 2021-05-10 PROCEDURE — 77030039342 HC PAD DEBRIDEMENT LOHM -B

## 2021-05-10 PROCEDURE — 93005 ELECTROCARDIOGRAM TRACING: CPT

## 2021-05-10 PROCEDURE — 84100 ASSAY OF PHOSPHORUS: CPT

## 2021-05-10 PROCEDURE — 99232 SBSQ HOSP IP/OBS MODERATE 35: CPT | Performed by: HOSPITALIST

## 2021-05-10 PROCEDURE — 74011250636 HC RX REV CODE- 250/636: Performed by: SPECIALIST

## 2021-05-10 PROCEDURE — 80048 BASIC METABOLIC PNL TOTAL CA: CPT

## 2021-05-10 PROCEDURE — 74011250637 HC RX REV CODE- 250/637: Performed by: FAMILY MEDICINE

## 2021-05-10 PROCEDURE — 83735 ASSAY OF MAGNESIUM: CPT

## 2021-05-10 RX ORDER — AMLODIPINE BESYLATE 5 MG/1
5 TABLET ORAL DAILY
Status: DISCONTINUED | OUTPATIENT
Start: 2021-05-10 | End: 2021-05-11

## 2021-05-10 RX ORDER — METOPROLOL TARTRATE 50 MG/1
100 TABLET ORAL EVERY 12 HOURS
Status: DISCONTINUED | OUTPATIENT
Start: 2021-05-10 | End: 2021-05-13 | Stop reason: HOSPADM

## 2021-05-10 RX ORDER — METOPROLOL TARTRATE 50 MG/1
50 TABLET ORAL ONCE
Status: COMPLETED | OUTPATIENT
Start: 2021-05-10 | End: 2021-05-10

## 2021-05-10 RX ADMIN — Medication 10 ML: at 14:11

## 2021-05-10 RX ADMIN — Medication 10 ML: at 05:30

## 2021-05-10 RX ADMIN — VANCOMYCIN HYDROCHLORIDE 1500 MG: 10 INJECTION, POWDER, LYOPHILIZED, FOR SOLUTION INTRAVENOUS at 14:09

## 2021-05-10 RX ADMIN — ENOXAPARIN SODIUM 40 MG: 40 INJECTION SUBCUTANEOUS at 11:59

## 2021-05-10 RX ADMIN — METOPROLOL TARTRATE 50 MG: 50 TABLET, FILM COATED ORAL at 11:59

## 2021-05-10 RX ADMIN — SODIUM CHLORIDE 75 ML/HR: 900 INJECTION, SOLUTION INTRAVENOUS at 14:24

## 2021-05-10 RX ADMIN — MORPHINE SULFATE 2 MG: 2 INJECTION, SOLUTION INTRAMUSCULAR; INTRAVENOUS at 11:59

## 2021-05-10 RX ADMIN — Medication 100 MG: at 11:59

## 2021-05-10 RX ADMIN — HYDROCODONE BITARTRATE AND ACETAMINOPHEN 2 TABLET: 5; 325 TABLET ORAL at 09:58

## 2021-05-10 RX ADMIN — MORPHINE SULFATE 2 MG: 2 INJECTION, SOLUTION INTRAMUSCULAR; INTRAVENOUS at 05:25

## 2021-05-10 RX ADMIN — AMLODIPINE BESYLATE 5 MG: 5 TABLET ORAL at 18:35

## 2021-05-10 RX ADMIN — Medication 10 ML: at 21:37

## 2021-05-10 RX ADMIN — PIPERACILLIN SODIUM AND TAZOBACTAM SODIUM 4.5 G: 4; .5 INJECTION, POWDER, LYOPHILIZED, FOR SOLUTION INTRAVENOUS at 23:00

## 2021-05-10 RX ADMIN — Medication 10 ML: at 14:10

## 2021-05-10 RX ADMIN — THERA TABS 1 TABLET: TAB at 11:59

## 2021-05-10 RX ADMIN — METOPROLOL TARTRATE 50 MG: 50 TABLET, FILM COATED ORAL at 14:32

## 2021-05-10 RX ADMIN — METOPROLOL TARTRATE 100 MG: 50 TABLET, FILM COATED ORAL at 21:36

## 2021-05-10 RX ADMIN — FOLIC ACID 1 MG: 1 TABLET ORAL at 12:00

## 2021-05-10 RX ADMIN — PIPERACILLIN SODIUM AND TAZOBACTAM SODIUM 4.5 G: 4; .5 INJECTION, POWDER, LYOPHILIZED, FOR SOLUTION INTRAVENOUS at 18:33

## 2021-05-10 RX ADMIN — WATER 2 G: 1 INJECTION INTRAMUSCULAR; INTRAVENOUS; SUBCUTANEOUS at 02:27

## 2021-05-10 RX ADMIN — VANCOMYCIN HYDROCHLORIDE 1500 MG: 10 INJECTION, POWDER, LYOPHILIZED, FOR SOLUTION INTRAVENOUS at 02:26

## 2021-05-10 RX ADMIN — ASPIRIN 81 MG: 81 TABLET, COATED ORAL at 11:59

## 2021-05-10 RX ADMIN — WATER 2 G: 1 INJECTION INTRAMUSCULAR; INTRAVENOUS; SUBCUTANEOUS at 11:59

## 2021-05-10 RX ADMIN — HYDRALAZINE HYDROCHLORIDE 10 MG: 20 INJECTION, SOLUTION INTRAMUSCULAR; INTRAVENOUS at 05:26

## 2021-05-10 NOTE — PROGRESS NOTES
Problem: Falls - Risk of  Goal: *Absence of Falls  Description: Document Alisha Puga Fall Risk and appropriate interventions in the flowsheet. Outcome: Progressing Towards Goal  Note: Fall Risk Interventions:  Mobility Interventions: Assess mobility with egress test, Bed/chair exit alarm, Patient to call before getting OOB         Medication Interventions: Bed/chair exit alarm         History of Falls Interventions: Bed/chair exit alarm         Problem: Patient Education: Go to Patient Education Activity  Goal: Patient/Family Education  Outcome: Progressing Towards Goal     Problem: Pressure Injury - Risk of  Goal: *Prevention of pressure injury  Description: Document Xavier Scale and appropriate interventions in the flowsheet. Outcome: Progressing Towards Goal  Note: Pressure Injury Interventions:       Moisture Interventions: Absorbent underpads, Minimize layers    Activity Interventions: Assess need for specialty bed, PT/OT evaluation    Mobility Interventions: Assess need for specialty bed, PT/OT evaluation, HOB 30 degrees or less    Nutrition Interventions: Document food/fluid/supplement intake    Friction and Shear Interventions: HOB 30 degrees or less                Problem: Patient Education: Go to Patient Education Activity  Goal: Patient/Family Education  Outcome: Progressing Towards Goal     Problem: Pain  Goal: *Control of Pain  Outcome: Progressing Towards Goal  Goal: *PALLIATIVE CARE:  Alleviation of Pain  Outcome: Progressing Towards Goal     Problem: Patient Education: Go to Patient Education Activity  Goal: Patient/Family Education  Outcome: Progressing Towards Goal     Problem: Impaired Skin Integrity/Pressure Injury Treatment  Goal: *Improvement of Existing Pressure Injury  Outcome: Progressing Towards Goal  Goal: *Prevention of pressure injury  Description: Document Xavier Scale and appropriate interventions in the flowsheet.   Outcome: Progressing Towards Goal  Note: Pressure Injury Interventions:       Moisture Interventions: Absorbent underpads, Minimize layers    Activity Interventions: Assess need for specialty bed, PT/OT evaluation    Mobility Interventions: Assess need for specialty bed, PT/OT evaluation, HOB 30 degrees or less    Nutrition Interventions: Document food/fluid/supplement intake    Friction and Shear Interventions: HOB 30 degrees or less                Problem: Patient Education: Go to Patient Education Activity  Goal: Patient/Family Education  Outcome: Progressing Towards Goal     Problem: Hypertension  Goal: *Blood pressure within specified parameters  Outcome: Progressing Towards Goal  Goal: *Fluid volume balance  Outcome: Progressing Towards Goal  Goal: *Labs within defined limits  Outcome: Progressing Towards Goal     Problem: Patient Education: Go to Patient Education Activity  Goal: Patient/Family Education  Outcome: Progressing Towards Goal     Problem: Nutrition Deficit  Goal: *Optimize nutritional status  Outcome: Progressing Towards Goal     Problem: Patient Education: Go to Patient Education Activity  Goal: Patient/Family Education  Outcome: Progressing Towards Goal     Problem: Patient Education: Go to Patient Education Activity  Goal: Patient/Family Education  Outcome: Progressing Towards Goal     Problem: General Infection Care Plan (Adult and Pediatric)  Goal: Improvement in signs and symptoms of infection  Outcome: Progressing Towards Goal  Goal: *Optimize nutritional status  Outcome: Progressing Towards Goal     Problem: Patient Education: Go to Patient Education Activity  Goal: Patient/Family Education  Outcome: Progressing Towards Goal

## 2021-05-10 NOTE — WOUND CARE
Physical Exam  Musculoskeletal:        Legs:      Room 467: Focused wound assess  Both lower legs, soft tissue necrosis, much of the necrotic tissue has come off with the dressings, partial thickness mostly throughout, pink & red base, POA. Topical treatment orders per nursing unit skin care protocol. Cleansed legs & dirt covered feet. Utilized debrisoft soft debriding pad. Wound Care Orders both lower legs: Unit nursing staff to cleanse wound with MicroKlenz antimicrobial wound spray from par room, apply Opticell AG gelling fiber dressing, 4x5 rectangle, cut dressing to fit, cover with ABD pads, kerlix wrap, change every 48hrs & prn soilage. Will turn over care to nursing staff at this time.   Isaak DUMONTN, RN, [facility], [address]

## 2021-05-10 NOTE — PROGRESS NOTES
Cardiology Progress Note    Admit Date: 5/5/2021  Attending Cardiologist: Dr. Dianna Padron:       -Sepsis associated with bilateral lower extremity cellulitis.  Tmax 101.9F.  S/p skin biopsy by Dr. Tolu Monroy 5/7/2021.  -Paroxysmal atrial flutter with rapid ventricular response in setting of above, converted to SR 5/8/2021 PM.  -Echocardiogram 5/6/2021: EF 55-60%, LV with normal cavity size, wall thickness and wall motion  -Elevated BP without Hx HTN  -EtOH use     No primary cardiologist        Atrial fibrillation CHADSVASC2 score stroke risk:   62 y. Q.                                       <37        + 0   male                                        Male     +0  CHF hx                                          No    + 0  HTN hx                                          Yes    +1  Stroke/TIA/Thromboembolism       No    +0  Vascular disease hx                      No    + 0  Diabetes Mellitus                           No    + 0   CHADSVASC2 score                  1      Annual Stroke Risk 0.6% - low-moderate risk          Plan:     Patient remains in sinus rhythm. Continue lopressor. No need for [facility] given transient nature of afib, continued on ASA. Will get follow up ECG for completeness. Otherwise no further cardiac work up. Continue treatment of underlying cellulitis. Staff addendum:  Remains SR on ASA and BB. No further cardiac workup planned. Will be available if questions. I saw, examined, and evaluated the patient. I personally reviewed the patient's labs, tests, vitals, orders, medications, updated history, and other providers assessments. I personally agree with the findings as stated and the plan as documented. Balaji Camacho MD      Subjective:     Remains in sinus. Legs painful.     Objective:      Patient Vitals for the past 8 hrs:   Temp Pulse Resp BP SpO2   05/10/21 0819 98.2 °F (36.8 °C) 95 19 (!) 173/88 95 %   05/10/21 0400 100 °F (37.8 °C) 93 18 (!) 169/96 95 %         Patient Vitals for the past 96 hrs:   Weight   05/06/21 1538 271 lb (122.9 kg)       TELE: normal sinus rhythm               Current Facility-Administered Medications   Medication Dose Route Frequency Last Admin    metoprolol tartrate (LOPRESSOR) tablet 50 mg  50 mg Oral Q12H 50 mg at 05/09/21 2227    0.9% sodium chloride infusion  75 mL/hr IntraVENous CONTINUOUS 75 mL/hr at 05/09/21 2005    morphine injection 2 mg  2 mg IntraVENous Q4H PRN 2 mg at 05/10/21 0525    HYDROcodone-acetaminophen (NORCO) 5-325 mg per tablet 2 Tab  2 Tab Oral Q6H PRN 2 Tab at 05/10/21 4755    aspirin delayed-release tablet 81 mg  81 mg Oral DAILY 81 mg at 05/09/21 1009    vancomycin (VANCOCIN) 1500 mg in  ml infusion  1,500 mg IntraVENous Q12H 1,500 mg at 05/10/21 0226    cefepime (MAXIPIME) 2 g in sterile water (preservative free) 10 mL IV syringe  2 g IntraVENous Q8H 2 g at 05/10/21 0227    sodium chloride (NS) flush 5-40 mL  5-40 mL IntraVENous Q8H 10 mL at 05/10/21 0530    sodium chloride (NS) flush 5-40 mL  5-40 mL IntraVENous PRN      acetaminophen (TYLENOL) tablet 650 mg  650 mg Oral Q6H  mg at 05/06/21 2228    Or    acetaminophen (TYLENOL) suppository 650 mg  650 mg Rectal Q6H  mg at 05/07/21 0558    polyethylene glycol (MIRALAX) packet 17 g  17 g Oral DAILY PRN      promethazine (PHENERGAN) tablet 12.5 mg  12.5 mg Oral Q6H PRN      Or    ondansetron (ZOFRAN) injection 4 mg  4 mg IntraVENous Q6H PRN      enoxaparin (LOVENOX) injection 40 mg  40 mg SubCUTAneous DAILY 40 mg at 05/09/21 0932    diphenhydrAMINE (BENADRYL) capsule 50 mg  50 mg Oral Q6H PRN 50 mg at 05/09/21 0242    hydrALAZINE (APRESOLINE) 20 mg/mL injection 10 mg  10 mg IntraVENous Q6H PRN 10 mg at 05/10/21 0526    sodium chloride (NS) flush 5-40 mL  5-40 mL IntraVENous Q8H 10 mL at 05/10/21 0530    sodium chloride (NS) flush 5-40 mL  5-40 mL IntraVENous PRN      LORazepam (ATIVAN) tablet 1 mg  1 mg Oral Q1H PRN      Or    LORazepam (ATIVAN) injection 1 mg  1 mg IntraVENous Q1H PRN      LORazepam (ATIVAN) tablet 2 mg  2 mg Oral Q1H PRN 2 mg at 05/08/21 2130    Or    LORazepam (ATIVAN) injection 2 mg  2 mg IntraVENous Q1H PRN      LORazepam (ATIVAN) injection 3 mg  3 mg IntraVENous Q15MIN PRN      thiamine HCL (B-1) tablet 100 mg  100 mg Oral DAILY 100 mg at 63/37/53 4211    folic acid (FOLVITE) tablet 1 mg  1 mg Oral DAILY 1 mg at 05/09/21 0932    therapeutic multivitamin (THERAGRAN) tablet 1 Tab  1 Tab Oral DAILY 1 Tab at 05/09/21 0932         Intake/Output Summary (Last 24 hours) at 5/10/2021 1124  Last data filed at 5/10/2021 2572  Gross per 24 hour   Intake 360 ml   Output 2600 ml   Net -2240 ml       Physical Exam:  General:  alert, cooperative, no distress, appears stated age  Neck:  no JVD  Lungs:  clear to auscultation bilaterally  Heart:  regular rate and rhythm  Abdomen:  abdomen is soft obese  Extremities:  BLE edematous with dressing in place.     Visit Vitals  BP (!) 173/88 (BP 1 Location: Right upper arm, BP Patient Position: At rest)   Pulse 95   Temp 98.2 °F (36.8 °C)   Resp 19   Ht 5' 11\" (1.803 m)   Wt 271 lb (122.9 kg)   SpO2 95%   BMI 37.80 kg/m²       Data Review:     Labs: Results:       Chemistry Recent Labs     05/10/21  0240 05/09/21  0221 05/08/21  0302   * 119* 89   * 131* 130*   K 4.6 4.3 4.2   CL 99* 100 98*   CO2 25 24 26   BUN 21* 24* 19*   CREA 1.08 1.02 0.91   CA 7.7* 7.5* 8.2*   MG 1.8 1.8 2.2   PHOS 3.5 3.8  --    AGAP 6 7 6   BUCR 19 24* 21*      CBC w/Diff Recent Labs     05/10/21  0240 05/09/21 0221 05/08/21  0302   WBC 12.0 11.5 13.1   RBC 3.60* 3.47* 3.73*   HGB 10.9* 10.8* 11.4*   HCT 33.6* 32.2* 33.6*    381 347   GRANS 85* 76* 81*   LYMPH 5* 10* 7*   EOS 1 0 1      Cardiac Enzymes No results found for: CPK, CK, CKMMB, CKMB, RCK3, CKMBT, CKNDX, CKND1, ELLI, TROPT, TROIQ, ISAÍAS, TROPT, TNIPOC, BNP, BNPP   Coagulation No results for input(s): PTP, INR, APTT, INREXT in the last 72 hours. Lipid Panel Lab Results   Component Value Date/Time    Cholesterol, total 96 05/07/2021 03:06 AM    HDL Cholesterol 36 (L) 05/07/2021 03:06 AM    LDL, calculated 49 05/07/2021 03:06 AM    VLDL, calculated 11 05/07/2021 03:06 AM    Triglyceride 55 05/07/2021 03:06 AM    CHOL/HDL Ratio 2.7 05/07/2021 03:06 AM      BNP No results found for: BNP, BNPP, XBNPT   Liver Enzymes No results for input(s): TP, ALB, TBIL, AP in the last 72 hours.     No lab exists for component: SGOT, GPT, DBIL   Thyroid Studies Lab Results   Component Value Date/Time    TSH 1.28 05/07/2021 03:06 AM          Signed By: JOHNATHAN Molina     May 10, 2021

## 2021-05-10 NOTE — PROGRESS NOTES
[facility]  Progress Note    Patient: Nat Mom Age: 62 y.o. : 1960 MR#: [id number] SSN: [id number]  Date/Time: 5/10/2021    Subjective: Patient feels fine, no complaints. Pain worse during the dressing change and movement, otherwise okay. No palpitations, no chest pain. Assessment/Plan:     1. A Flutter RVR, converted to sinus rhythm now. 2. Bilateral lower extremity cellulitis, needs to rule out vasculitis  3. SEPSIS with tachy and leukocytosis due to # 2, POA  4. Hypertension, BP elevated  5. Hyponatremia  6. Arthritis,? Rheumatoid per patient  7. Alcoholism  8. Tobacco abuse    Plan  1. Will increase metoprolol and also add amlodipine for better blood pressure control. 2. Cardiology input noted, no need for anticoagulation. 3. Pending results of skin biopsy. Given history of severe arthritis, needs to rule out vasculitis. 4. Continue antibiotics with vancomycin and cefepime. Follow cultures. 5. Currently not showing signs of alcohol withdrawal  6. Continue thiamine  7. Continue pain medicines  8. Nephrology input noted, agree with discontinuing IV fluids. 9. Vascular surgery input noted. Based on biopsy result patient might need debridement. 10. Continue PT OT  Discussed with RN    Discussed with the patient explained about my above plan of care. He understood and agreed with my plan.     Case discussed with:  [x]Patient  [x]Family  [x]Nursing  []Case Management  DVT Prophylaxis:  [x]Lovenox  []Hep SQ  []SCDs  []Coumadin   []On Heparin gtt    Objective:   VS:   Visit Vitals  BP (!) 177/88   Pulse 93   Temp 97.5 °F (36.4 °C)   Resp 20   Ht 5' 11\" (1.803 m)   Wt 122.9 kg (271 lb)   SpO2 95%   BMI 37.80 kg/m²      Tmax/24hrs: Temp (24hrs), Av.4 °F (36.9 °C), Min:97.5 °F (36.4 °C), Max:100 °F (37.8 °C)    Input/Output:     Intake/Output Summary (Last 24 hours) at 5/10/2021 1632  Last data filed at 5/10/2021 1300  Gross per 24 hour   Intake 840 ml   Output 2800 ml   Net -1960 ml       General: Alert awake, no acute distress. Cardiovascular: regular rate and rhythm  Pulmonary:  CTA b/l  GI:  Soft, BS+, NT, ND  Extremities: Bilateral lower extremity extensive redness erythema and multiple areas of skin necrosis. Redness erythema slightly better, swelling improving, good ROM in ankle and knees. Alert awake on x3, moves all extremities. Labs:    Recent Results (from the past 24 hour(s))   METABOLIC PANEL, BASIC    Collection Time: 05/10/21  2:40 AM   Result Value Ref Range    Sodium 130 (L) 136 - 145 mmol/L    Potassium 4.6 3.5 - 5.5 mmol/L    Chloride 99 (L) 100 - 111 mmol/L    CO2 25 21 - 32 mmol/L    Anion gap 6 3.0 - 18 mmol/L    Glucose 106 (H) 74 - 99 mg/dL    BUN 21 (H) 7.0 - 18 MG/DL    Creatinine 1.08 0.6 - 1.3 MG/DL    BUN/Creatinine ratio 19 12 - 20      GFR est AA >60 >60 ml/min/1.73m2    GFR est non-AA >60 >60 ml/min/1.73m2    Calcium 7.7 (L) 8.5 - 10.1 MG/DL   CBC WITH AUTOMATED DIFF    Collection Time: 05/10/21  2:40 AM   Result Value Ref Range    WBC 12.0 4.6 - 13.2 K/uL    RBC 3.60 (L) 4.35 - 5.65 M/uL    HGB 10.9 (L) 13.0 - 16.0 g/dL    HCT 33.6 (L) 36.0 - 48.0 %    MCV 93.3 74.0 - 97.0 FL    MCH 30.3 24.0 - 34.0 PG    MCHC 32.4 31.0 - 37.0 g/dL    RDW 13.4 11.6 - 14.5 %    PLATELET 348 012 - 417 K/uL    MPV 8.8 (L) 9.2 - 11.8 FL    NEUTROPHILS 85 (H) 40 - 73 %    BAND NEUTROPHILS 2 0 - 5 %    LYMPHOCYTES 5 (L) 21 - 52 %    MONOCYTES 4 3 - 10 %    EOSINOPHILS 1 0 - 5 %    BASOPHILS 3 (H) 0 - 2 %    ABS. NEUTROPHILS 10.4 (H) 1.8 - 8.0 K/UL    ABS. LYMPHOCYTES 0.6 (L) 0.9 - 3.6 K/UL    ABS. MONOCYTES 0.5 0.05 - 1.2 K/UL    ABS. EOSINOPHILS 0.1 0.0 - 0.4 K/UL    ABS.  BASOPHILS 0.4 (H) 0.0 - 0.1 K/UL    DF MANUAL      PLATELET COMMENTS ADEQUATE PLATELETS      RBC COMMENTS NORMOCYTIC, NORMOCHROMIC     PHOSPHORUS    Collection Time: 05/10/21  2:40 AM   Result Value Ref Range    Phosphorus 3.5 2.5 - 4.9 MG/DL   MAGNESIUM    Collection Time: 05/10/21  2:40 AM   Result Value Ref Range    Magnesium 1.8 1.6 - 2.6 mg/dL   EKG, 12 LEAD, SUBSEQUENT    Collection Time: 05/10/21  1:41 PM   Result Value Ref Range    Ventricular Rate 97 BPM    Atrial Rate 97 BPM    P-R Interval 158 ms    QRS Duration 92 ms    Q-T Interval 338 ms    QTC Calculation (Bezet) 429 ms    Calculated P Axis 48 degrees    Calculated R Axis 47 degrees    Calculated T Axis 72 degrees    Diagnosis       Normal sinus rhythm  Nonspecific T wave abnormality  Abnormal ECG  When compared with ECG of 08-MAY-2021 12:51,  Sinus rhythm has replaced Atrial flutter  Vent.  rate has decreased BY  66 BPM  Borderline criteria for Inferior infarct are no longer present  ST no longer depressed in Inferior leads  ST no longer depressed in Anterior leads  T wave inversion no longer evident in Inferior leads  Confirmed by Iona Cruz MD, --- ([id number]) on 5/10/2021 3:32:29 PM       Additional Data Reviewed:      Signed By: Coni Pond MD     May 10, 2021

## 2021-05-10 NOTE — PROGRESS NOTES
Infectious Disease  progress Note        Reason: Bilateral leg cellulitis    Current abx Prior abx   Ceftriaxone, vancomycin since 5/5/2021      Lines:       Assessment :    52-year-old man with past medical history significant for obesity, upper extremity cellulitis 4 years ago, arthritis admitted to [facility] on 5/5/2021 with increasing erythema/pain bilateral legs for several weeks. status post steroid injection in both knees on 3/18/21  H/o tick bite on abdomen around 4/18/21    Now with 5 week h/o increasing bilateral leg swelling, 3 week h/o increasing redness/pain bilateral legs with blackish discoloration of legs, cyanotic appearance of feet    Patient presents with a highly complex clinical picture. Clinical presentation c/w acute gangrenous cellulitis of both legs    Wound culture 5/7- pseudomonas putida, enterococcus-susceptibilities reviewed    Status post skin biopsy of lower extremity ulcer on 5/7/2021- afb stain negative. Fungal cultures, histology pending. D/w histology lab-results should be available tomorrow    Decreased erythema and warmth noted on today's exam    Recommendations:    1. D/c cefepime, vancomycin. Start piperacillin/tazobactam  2. Follow-up skin biopsy histology, bacterial/fungal/AFB cultures  3. Follow-up vascular surgery recommendations regarding debridement of the ulcer  4. Monitor CBC, clinically     Above plan was discussed in details with patient, RN and dr Marleny Quarles. Please call me if any further questions or concerns. Will continue to participate in the care of this patient. HPI:    Feels slightly better today. Decreased leg pain/itching        home Medication List    Details   ibuprofen (MOTRIN IB) 200 mg tablet Take  by mouth.      meloxicam (MOBIC) 15 mg tablet Take 1 Tab by mouth daily (with breakfast).   Qty: 30 Tab, Refills: 1    Associated Diagnoses: Cellulitis of right hand      naproxen (NAPROSYN) 375 mg tablet Take 1 Tab by mouth two (2) times daily (with meals). Qty: 20 Tab, Refills: 0      traMADol (ULTRAM) 50 mg tablet Take 1 Tab by mouth every six (6) hours as needed for Pain. Max Daily Amount: 200 mg.   Qty: 12 Tab, Refills: 0    Associated Diagnoses: Arthritis of right hand             Current Facility-Administered Medications   Medication Dose Route Frequency    metoprolol tartrate (LOPRESSOR) tablet 50 mg  50 mg Oral Q12H    0.9% sodium chloride infusion  75 mL/hr IntraVENous CONTINUOUS    morphine injection 2 mg  2 mg IntraVENous Q4H PRN    HYDROcodone-acetaminophen (NORCO) 5-325 mg per tablet 2 Tab  2 Tab Oral Q6H PRN    aspirin delayed-release tablet 81 mg  81 mg Oral DAILY    vancomycin (VANCOCIN) 1500 mg in  ml infusion  1,500 mg IntraVENous Q12H    cefepime (MAXIPIME) 2 g in sterile water (preservative free) 10 mL IV syringe  2 g IntraVENous Q8H    sodium chloride (NS) flush 5-40 mL  5-40 mL IntraVENous Q8H    sodium chloride (NS) flush 5-40 mL  5-40 mL IntraVENous PRN    acetaminophen (TYLENOL) tablet 650 mg  650 mg Oral Q6H PRN    Or    acetaminophen (TYLENOL) suppository 650 mg  650 mg Rectal Q6H PRN    polyethylene glycol (MIRALAX) packet 17 g  17 g Oral DAILY PRN    promethazine (PHENERGAN) tablet 12.5 mg  12.5 mg Oral Q6H PRN    Or    ondansetron (ZOFRAN) injection 4 mg  4 mg IntraVENous Q6H PRN    enoxaparin (LOVENOX) injection 40 mg  40 mg SubCUTAneous DAILY    diphenhydrAMINE (BENADRYL) capsule 50 mg  50 mg Oral Q6H PRN    hydrALAZINE (APRESOLINE) 20 mg/mL injection 10 mg  10 mg IntraVENous Q6H PRN    sodium chloride (NS) flush 5-40 mL  5-40 mL IntraVENous Q8H    sodium chloride (NS) flush 5-40 mL  5-40 mL IntraVENous PRN    LORazepam (ATIVAN) tablet 1 mg  1 mg Oral Q1H PRN    Or    LORazepam (ATIVAN) injection 1 mg  1 mg IntraVENous Q1H PRN    LORazepam (ATIVAN) tablet 2 mg  2 mg Oral Q1H PRN    Or    LORazepam (ATIVAN) injection 2 mg  2 mg IntraVENous Q1H PRN    LORazepam (ATIVAN) injection 3 mg  3 mg IntraVENous Q15MIN PRN    thiamine HCL (B-1) tablet 100 mg  100 mg Oral DAILY    folic acid (FOLVITE) tablet 1 mg  1 mg Oral DAILY    therapeutic multivitamin (THERAGRAN) tablet 1 Tab  1 Tab Oral DAILY       Allergies: Patient has no known allergies. Temp (24hrs), Av.4 °F (36.9 °C), Min:97.8 °F (36.6 °C), Max:100 °F (37.8 °C)    Visit Vitals  BP (!) 173/88 (BP 1 Location: Right upper arm, BP Patient Position: At rest)   Pulse 95   Temp 98.2 °F (36.8 °C)   Resp 19   Ht 5' 11\" (1.803 m)   Wt 122.9 kg (271 lb)   SpO2 95%   BMI 37.80 kg/m²       ROS: 12 point ROS obtained in details. Pertinent positives as mentioned in HPI,   otherwise negative    Physical Exam:    Vitals signs and nursing note reviewed. Constitutional:          Appearance: He is well-developed. obese. Laying on the bed, appears more comfortable than prior exam   HENT:      Head: Normocephalic and atraumatic. Eyes:      General: No scleral icterus. Conjunctiva/sclera: Conjunctivae normal.   Neck:      Musculoskeletal: Normal range of motion and neck supple. Vascular: No JVD. Cardiovascular:      Rate and Rhythm: Normal rate and regular rhythm. Heart sounds: Normal heart sounds. Pulmonary:      Effort: Pulmonary effort is normal.      Breath sounds: Normal breath sounds. Abdominal:      Palpations: Abdomen is soft. There is no mass. Tenderness: There is no abdominal tenderness. Musculoskeletal: Normal range of motion. necrotic skin both LE with surrounding erythema, superficial ulcers extending from few cm below the knee to ankle, mottled appearance both feet with blackish debris between the toes, able to palpate dorsalis pedis pulsation bilaterally  Lymphadenopathy:      Cervical: No cervical adenopathy. Skin:     General: Skin is warm and dry.    Neurological:      Mental Status: He is alert.            Labs: Results:   Chemistry Recent Labs     05/10/21  0240 21  0221 21  0302   * 119* 89   NA 130* 131* 130*   K 4.6 4.3 4.2   CL 99* 100 98*   CO2 25 24 26   BUN 21* 24* 19*   CREA 1.08 1.02 0.91   CA 7.7* 7.5* 8.2*   AGAP 6 7 6   BUCR 19 24* 21*      CBC w/Diff Recent Labs     05/10/21  0240 05/09/21  0221 05/08/21  0302   WBC 12.0 11.5 13.1   RBC 3.60* 3.47* 3.73*   HGB 10.9* 10.8* 11.4*   HCT 33.6* 32.2* 33.6*    381 347   GRANS 85* 76* 81*   LYMPH 5* 10* 7*   EOS 1 0 1      Microbiology Recent Labs     05/07/21  1319   CULT LIGHT STREPTOCOCCI, BETA HEMOLYTIC GROUP B Penicillin and ampicillin are drugs of choice for treatment of beta-hemolytic streptococcal infections. Susceptibility testing of penicillins and beta-lactams approved by the FDA for treatment of beta-hemolytic streptococcal infections need not be performed routinely, because nonsusceptible isolates are extremely rare. CLSI 2012*  LIGHT ENTEROCOCCUS FAECALIS SENSITIVITY TO FOLLOW*  SCANT GRAM NEGATIVE RODS*          RADIOLOGY:    All available imaging studies/reports in [facility] for this admission were reviewed    High complexity decision making was performed during the evaluation of this patient at high risk for decompensation with multiple organ involvement       Disclaimer: Sections of this note are dictated utilizing voice recognition software, which may have resulted in some phonetic based errors in grammar and contents. Even though attempts were made to correct all the mistakes, some may have been missed, and remained in the body of the document. If questions arise, please contact our department.     Dr. Shantanu Benitez, Infectious Disease Specialist  [phone]  May 10, 2021  9:27 AM

## 2021-05-10 NOTE — PROGRESS NOTES
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 - respiratory - stable  - hemodynamics - stable, no pressrs  - UOP-ok  - Nutrition -poor    Objective:     Visit Vitals  BP (!) 177/88   Pulse 93   Temp 97.5 °F (36.4 °C)   Resp 20   Ht 5' 11\" (1.803 m)   Wt 122.9 kg (271 lb)   SpO2 95%   BMI 37.80 kg/m²         Intake/Output Summary (Last 24 hours) at 5/10/2021 1545  Last data filed at 5/10/2021 1131  Gross per 24 hour   Intake 360 ml   Output 2800 ml   Net -2440 ml       Physical Exam:     Patient is in no apparent distress. HEENT: Head is normocephalic and atraumatic. Neck: no cervical lymphadenopathy or thyromegaly. Lungs: good air entry, clear to auscultation bilaterally. Cardiovascular system: S1, S2, regular rate and rhythm. Abdomen: soft, non tender, non distended.    Extremities:meche lower ext erythema + , swelling , tenderness,  dark areas, discharge      Data Review:    Recent Labs     05/10/21  0240   WBC 12.0   RBC 3.60*   HCT 33.6*   MCV 93.3   MCH 30.3   MCHC 32.4   RDW 13.4     Recent Labs     05/10/21  0240 05/09/21  0221 05/08/21  0302   BUN 21* 24* 19*   CREA 1.08 1.02 0.91   CA 7.7* 7.5* 8.2*   K 4.6 4.3 4.2   * 131* 130*   CL 99* 100 98*   CO2 25 24 26   PHOS 3.5 3.8  --    * 119* 89       Estuardo Rojo MD

## 2021-05-10 NOTE — PROGRESS NOTES
CM spoke to patient about [address], patient said he would like to discuss it with his wife.         Jared Morgan RN  Case Management [phone]

## 2021-05-11 LAB
ANION GAP SERPL CALC-SCNC: 9 MMOL/L (ref 3–18)
BACTERIA SPEC CULT: ABNORMAL
BACTERIA SPEC CULT: NORMAL
BACTERIA SPEC CULT: NORMAL
BASOPHILS # BLD: 0 K/UL (ref 0–0.1)
BASOPHILS NFR BLD: 0 % (ref 0–2)
BUN SERPL-MCNC: 20 MG/DL (ref 7–18)
BUN/CREAT SERPL: 18 (ref 12–20)
CALCIUM SERPL-MCNC: 8 MG/DL (ref 8.5–10.1)
CHLORIDE SERPL-SCNC: 99 MMOL/L (ref 100–111)
CO2 SERPL-SCNC: 22 MMOL/L (ref 21–32)
CREAT SERPL-MCNC: 1.14 MG/DL (ref 0.6–1.3)
DIFFERENTIAL METHOD BLD: ABNORMAL
EOSINOPHIL # BLD: 0 K/UL (ref 0–0.4)
EOSINOPHIL NFR BLD: 0 % (ref 0–5)
ERYTHROCYTE [DISTWIDTH] IN BLOOD BY AUTOMATED COUNT: 13.3 % (ref 11.6–14.5)
GLUCOSE SERPL-MCNC: 103 MG/DL (ref 74–99)
GRAM STN SPEC: ABNORMAL
GRAM STN SPEC: ABNORMAL
HCT VFR BLD AUTO: 33.1 % (ref 36–48)
HGB BLD-MCNC: 10.9 G/DL (ref 13–16)
LYMPHOCYTES # BLD: 1 K/UL (ref 0.9–3.6)
LYMPHOCYTES NFR BLD: 8 % (ref 21–52)
MAGNESIUM SERPL-MCNC: 2 MG/DL (ref 1.6–2.6)
MCH RBC QN AUTO: 30.4 PG (ref 24–34)
MCHC RBC AUTO-ENTMCNC: 32.9 G/DL (ref 31–37)
MCV RBC AUTO: 92.2 FL (ref 74–97)
METAMYELOCYTES NFR BLD MANUAL: 2 %
MONOCYTES # BLD: 0.9 K/UL (ref 0.05–1.2)
MONOCYTES NFR BLD: 7 % (ref 3–10)
NEUTS BAND NFR BLD MANUAL: 5 % (ref 0–5)
NEUTS SEG # BLD: 10.5 K/UL (ref 1.8–8)
NEUTS SEG NFR BLD: 78 % (ref 40–73)
PLATELET # BLD AUTO: 344 K/UL (ref 135–420)
PLATELET COMMENTS,PCOM: ABNORMAL
PMV BLD AUTO: 8.8 FL (ref 9.2–11.8)
POTASSIUM SERPL-SCNC: 4 MMOL/L (ref 3.5–5.5)
RBC # BLD AUTO: 3.59 M/UL (ref 4.35–5.65)
RBC MORPH BLD: ABNORMAL
SERVICE CMNT-IMP: ABNORMAL
SERVICE CMNT-IMP: NORMAL
SERVICE CMNT-IMP: NORMAL
SODIUM SERPL-SCNC: 130 MMOL/L (ref 136–145)
WBC # BLD AUTO: 12.6 K/UL (ref 4.6–13.2)

## 2021-05-11 PROCEDURE — 74011250637 HC RX REV CODE- 250/637: Performed by: FAMILY MEDICINE

## 2021-05-11 PROCEDURE — 74011250637 HC RX REV CODE- 250/637: Performed by: PHYSICIAN ASSISTANT

## 2021-05-11 PROCEDURE — 83520 IMMUNOASSAY QUANT NOS NONAB: CPT

## 2021-05-11 PROCEDURE — 74011250636 HC RX REV CODE- 250/636: Performed by: INTERNAL MEDICINE

## 2021-05-11 PROCEDURE — 83735 ASSAY OF MAGNESIUM: CPT

## 2021-05-11 PROCEDURE — 65270000029 HC RM PRIVATE

## 2021-05-11 PROCEDURE — 97535 SELF CARE MNGMENT TRAINING: CPT

## 2021-05-11 PROCEDURE — 74011250636 HC RX REV CODE- 250/636: Performed by: FAMILY MEDICINE

## 2021-05-11 PROCEDURE — 36415 COLL VENOUS BLD VENIPUNCTURE: CPT

## 2021-05-11 PROCEDURE — 85025 COMPLETE CBC W/AUTO DIFF WBC: CPT

## 2021-05-11 PROCEDURE — 86200 CCP ANTIBODY: CPT

## 2021-05-11 PROCEDURE — 74011250637 HC RX REV CODE- 250/637: Performed by: HOSPITALIST

## 2021-05-11 PROCEDURE — 2709999900 HC NON-CHARGEABLE SUPPLY

## 2021-05-11 PROCEDURE — 80048 BASIC METABOLIC PNL TOTAL CA: CPT

## 2021-05-11 PROCEDURE — 99232 SBSQ HOSP IP/OBS MODERATE 35: CPT | Performed by: HOSPITALIST

## 2021-05-11 PROCEDURE — 86038 ANTINUCLEAR ANTIBODIES: CPT

## 2021-05-11 PROCEDURE — 86235 NUCLEAR ANTIGEN ANTIBODY: CPT

## 2021-05-11 PROCEDURE — 74011000258 HC RX REV CODE- 258: Performed by: INTERNAL MEDICINE

## 2021-05-11 RX ORDER — AMLODIPINE BESYLATE 10 MG/1
10 TABLET ORAL DAILY
Status: DISCONTINUED | OUTPATIENT
Start: 2021-05-12 | End: 2021-05-13 | Stop reason: HOSPADM

## 2021-05-11 RX ORDER — AMLODIPINE BESYLATE 5 MG/1
5 TABLET ORAL
Status: COMPLETED | OUTPATIENT
Start: 2021-05-11 | End: 2021-05-11

## 2021-05-11 RX ADMIN — METOPROLOL TARTRATE 100 MG: 50 TABLET, FILM COATED ORAL at 09:52

## 2021-05-11 RX ADMIN — HYDRALAZINE HYDROCHLORIDE 10 MG: 20 INJECTION, SOLUTION INTRAMUSCULAR; INTRAVENOUS at 16:29

## 2021-05-11 RX ADMIN — ENOXAPARIN SODIUM 40 MG: 40 INJECTION SUBCUTANEOUS at 09:54

## 2021-05-11 RX ADMIN — Medication 10 ML: at 05:34

## 2021-05-11 RX ADMIN — ASPIRIN 81 MG: 81 TABLET, COATED ORAL at 09:52

## 2021-05-11 RX ADMIN — Medication 10 ML: at 21:29

## 2021-05-11 RX ADMIN — HYDRALAZINE HYDROCHLORIDE 10 MG: 20 INJECTION, SOLUTION INTRAMUSCULAR; INTRAVENOUS at 06:32

## 2021-05-11 RX ADMIN — AMLODIPINE BESYLATE 5 MG: 5 TABLET ORAL at 09:52

## 2021-05-11 RX ADMIN — PIPERACILLIN SODIUM AND TAZOBACTAM SODIUM 4.5 G: 4; .5 INJECTION, POWDER, LYOPHILIZED, FOR SOLUTION INTRAVENOUS at 11:57

## 2021-05-11 RX ADMIN — PIPERACILLIN SODIUM AND TAZOBACTAM SODIUM 4.5 G: 4; .5 INJECTION, POWDER, LYOPHILIZED, FOR SOLUTION INTRAVENOUS at 04:02

## 2021-05-11 RX ADMIN — FOLIC ACID 1 MG: 1 TABLET ORAL at 09:52

## 2021-05-11 RX ADMIN — HYDROCODONE BITARTRATE AND ACETAMINOPHEN 2 TABLET: 5; 325 TABLET ORAL at 04:39

## 2021-05-11 RX ADMIN — Medication 10 ML: at 14:41

## 2021-05-11 RX ADMIN — HYDROCODONE BITARTRATE AND ACETAMINOPHEN 2 TABLET: 5; 325 TABLET ORAL at 19:05

## 2021-05-11 RX ADMIN — METOPROLOL TARTRATE 100 MG: 50 TABLET, FILM COATED ORAL at 20:19

## 2021-05-11 RX ADMIN — Medication 10 ML: at 14:42

## 2021-05-11 RX ADMIN — PIPERACILLIN SODIUM AND TAZOBACTAM SODIUM 4.5 G: 4; .5 INJECTION, POWDER, LYOPHILIZED, FOR SOLUTION INTRAVENOUS at 23:20

## 2021-05-11 RX ADMIN — AMLODIPINE BESYLATE 5 MG: 5 TABLET ORAL at 14:35

## 2021-05-11 RX ADMIN — HYDRALAZINE HYDROCHLORIDE 10 MG: 20 INJECTION, SOLUTION INTRAMUSCULAR; INTRAVENOUS at 00:23

## 2021-05-11 RX ADMIN — PIPERACILLIN SODIUM AND TAZOBACTAM SODIUM 4.5 G: 4; .5 INJECTION, POWDER, LYOPHILIZED, FOR SOLUTION INTRAVENOUS at 19:01

## 2021-05-11 RX ADMIN — Medication 100 MG: at 09:52

## 2021-05-11 RX ADMIN — Medication 10 ML: at 22:00

## 2021-05-11 RX ADMIN — THERA TABS 1 TABLET: TAB at 09:52

## 2021-05-11 NOTE — ROUTINE PROCESS
Bedside and Verbal shift change report given to WILLIAM Vicente (oncoming nurse) by Sabrina Nichole RN (offgoing nurse). Report included the following information SBAR, Kardex, MAR and Recent Results.

## 2021-05-11 NOTE — PROGRESS NOTES
Problem: Falls - Risk of  Goal: *Absence of Falls  Description: Document [address] Fall Risk and appropriate interventions in the flowsheet. Outcome: Progressing Towards Goal  Note: Fall Risk Interventions:  Mobility Interventions: Assess mobility with egress test, Bed/chair exit alarm         Medication Interventions: Bed/chair exit alarm         History of Falls Interventions: Bed/chair exit alarm         Problem: Patient Education: Go to Patient Education Activity  Goal: Patient/Family Education  Outcome: Progressing Towards Goal     Problem: Pressure Injury - Risk of  Goal: *Prevention of pressure injury  Description: Document Xavier Scale and appropriate interventions in the flowsheet. Outcome: Progressing Towards Goal  Note: Pressure Injury Interventions:       Moisture Interventions: Absorbent underpads    Activity Interventions: Assess need for specialty bed    Mobility Interventions: Assess need for specialty bed    Nutrition Interventions: Document food/fluid/supplement intake    Friction and Shear Interventions: HOB 30 degrees or less                Problem: Patient Education: Go to Patient Education Activity  Goal: Patient/Family Education  Outcome: Progressing Towards Goal     Problem: Pain  Goal: *Control of Pain  Outcome: Progressing Towards Goal  Goal: *PALLIATIVE CARE:  Alleviation of Pain  Outcome: Progressing Towards Goal     Problem: Patient Education: Go to Patient Education Activity  Goal: Patient/Family Education  Outcome: Progressing Towards Goal     Problem: Impaired Skin Integrity/Pressure Injury Treatment  Goal: *Improvement of Existing Pressure Injury  Outcome: Progressing Towards Goal  Goal: *Prevention of pressure injury  Description: Document Xavier Scale and appropriate interventions in the flowsheet.   Outcome: Progressing Towards Goal  Note: Pressure Injury Interventions:       Moisture Interventions: Absorbent underpads    Activity Interventions: Assess need for specialty bed    Mobility Interventions: Assess need for specialty bed    Nutrition Interventions: Document food/fluid/supplement intake    Friction and Shear Interventions: HOB 30 degrees or less                Problem: Patient Education: Go to Patient Education Activity  Goal: Patient/Family Education  Outcome: Progressing Towards Goal     Problem: Hypertension  Goal: *Blood pressure within specified parameters  Outcome: Progressing Towards Goal  Goal: *Fluid volume balance  Outcome: Progressing Towards Goal  Goal: *Labs within defined limits  Outcome: Progressing Towards Goal     Problem: Patient Education: Go to Patient Education Activity  Goal: Patient/Family Education  Outcome: Progressing Towards Goal     Problem: Nutrition Deficit  Goal: *Optimize nutritional status  Outcome: Progressing Towards Goal     Problem: Patient Education: Go to Patient Education Activity  Goal: Patient/Family Education  Outcome: Progressing Towards Goal     Problem: Patient Education: Go to Patient Education Activity  Goal: Patient/Family Education  Outcome: Progressing Towards Goal     Problem: General Infection Care Plan (Adult and Pediatric)  Goal: Improvement in signs and symptoms of infection  Outcome: Progressing Towards Goal  Goal: *Optimize nutritional status  Outcome: Progressing Towards Goal     Problem: Patient Education: Go to Patient Education Activity  Goal: Patient/Family Education  Outcome: Progressing Towards Goal

## 2021-05-11 NOTE — PROGRESS NOTES
Problem: Self Care Deficits Care Plan (Adult)  Goal: *Acute Goals and Plan of Care (Insert Text)  Description: Occupational Therapy Goals  Initiated 5/8/2021 within 7 day(s). 1.  Patient will perform LB dressing with modified independence  2. Patient will maneuver on and off BSC with modified independence (once medically cleared for standing)  3. Patient will perform standing during LB clothes management with modified independence (once medically cleared for standing)  Outcome: Progressing Towards Goal   OCCUPATIONAL THERAPY TREATMENT    Patient: Valery Schlatter (88 y.o. male)  Date: 5/11/2021  Diagnosis: Sepsis ([facility] [address] [A41.9] <principal problem not specified>  Procedure(s) (LRB):  SKIN BIOPSY OF RIGHT LOWER EXTREMITY (Right) 4 Days Post-Op  Precautions: Aspiration    Chart, occupational therapy assessment, plan of care, and goals were reviewed. ASSESSMENT:  Pt requires encouragement for EOB activity 2/2 c/o fatigue. Pt edematous throughout. Educated on benefits EOB/OOB activity. Pt tolerates EOB activity ~ 15 minutes, performing ADL grooming tasks. Educated on energy conservation techniques w/ADLs and benefits of shower chair. Pt requires hand held assist w/functional transfer to standing. Pt w/LOB requiring return to sitting. Pt educated on safety w/RW and lateral steps to [facility] in prep for functional transfer training to BSC/toilet. Pt declines OOB to chair. Educated on importance OOB and encouraged OOB for all meals. Pt returned to supine and left w/all needs within reach. Progression toward goals:  []          Improving appropriately and progressing toward goals  [x]          Improving slowly and progressing toward goals  []          Not making progress toward goals and plan of care will be adjusted     PLAN:  Patient continues to benefit from skilled intervention to address the above impairments. Continue treatment per established plan of care.   Discharge Recommendations:  Rehab  Further Equipment Recommendations for Discharge:  shower chair and rolling walker     SUBJECTIVE:   Patient stated So now, I found out that I got fired from my job.     OBJECTIVE DATA SUMMARY:   Cognitive/Behavioral Status:  Neurologic State: Alert  Orientation Level: Oriented X4  Cognition: Follows commands    Functional Mobility and Transfers for ADLs:   Bed Mobility:  Supine to Sit: Modified independent(w/HOB raised and SR)  Sit to Supine: Modified independent; Additional time   Transfers:  Sit to Stand: Contact guard assistance(w/RW)  Balance:  Sitting: Intact  Standing: Impaired; With support    ADL Intervention:  Grooming  Position Performed: Seated edge of bed  Washing Face: Set-up  Washing Hands: Set-up  Brushing Teeth: Set-up  Brushing/Combing Hair: Minimum assistance(assist for increase tangles)    Lower Body Dressing Assistance  Socks: Total assistance (dependent)    Pain:  Pain level pre-treatment: 0/10   Pain level post-treatment: 0/10    Activity Tolerance:    Fair    Please refer to the flowsheet for vital signs taken during this treatment. After treatment:   []  Patient left in no apparent distress sitting up in chair  [x]  Patient left in no apparent distress in bed  [x]  Call bell left within reach  []  Nursing notified  []  Caregiver present  []  Bed alarm activated    COMMUNICATION/EDUCATION:   [] Role of Occupational Therapy in the acute care setting  [] Home safety education was provided and the patient/caregiver indicated understanding. [] Patient/family have participated as able in working towards goals and plan of care. [x] Patient/family agree to work toward stated goals and plan of care. [] Patient understands intent and goals of therapy, but is neutral about his/her participation. [] Patient is unable to participate in goal setting and plan of care.       Thank you for this referral.  HOLLI Calhoun  Time Calculation: 27 mins

## 2021-05-11 NOTE — PROGRESS NOTES
Problem: Falls - Risk of  Goal: *Absence of Falls  Description: Document Tata Valentin Fall Risk and appropriate interventions in the flowsheet. Outcome: Progressing Towards Goal  Note: Fall Risk Interventions:  Mobility Interventions: Assess mobility with egress test         Medication Interventions: Bed/chair exit alarm         History of Falls Interventions: Bed/chair exit alarm         Problem: Patient Education: Go to Patient Education Activity  Goal: Patient/Family Education  Outcome: Progressing Towards Goal     Problem: Pressure Injury - Risk of  Goal: *Prevention of pressure injury  Description: Document Xavier Scale and appropriate interventions in the flowsheet. Outcome: Progressing Towards Goal  Note: Pressure Injury Interventions:       Moisture Interventions: Absorbent underpads, Minimize layers    Activity Interventions: Assess need for specialty bed, PT/OT evaluation    Mobility Interventions: Assess need for specialty bed, PT/OT evaluation, HOB 30 degrees or less    Nutrition Interventions: Document food/fluid/supplement intake    Friction and Shear Interventions: HOB 30 degrees or less                Problem: Patient Education: Go to Patient Education Activity  Goal: Patient/Family Education  Outcome: Progressing Towards Goal     Problem: Pain  Goal: *Control of Pain  Outcome: Progressing Towards Goal  Goal: *PALLIATIVE CARE:  Alleviation of Pain  Outcome: Progressing Towards Goal     Problem: Patient Education: Go to Patient Education Activity  Goal: Patient/Family Education  Outcome: Progressing Towards Goal     Problem: Impaired Skin Integrity/Pressure Injury Treatment  Goal: *Improvement of Existing Pressure Injury  Outcome: Progressing Towards Goal  Goal: *Prevention of pressure injury  Description: Document Xavier Scale and appropriate interventions in the flowsheet.   Outcome: Progressing Towards Goal  Note: Pressure Injury Interventions:       Moisture Interventions: Absorbent underpads, Minimize layers    Activity Interventions: Assess need for specialty bed, PT/OT evaluation    Mobility Interventions: Assess need for specialty bed, PT/OT evaluation, HOB 30 degrees or less    Nutrition Interventions: Document food/fluid/supplement intake    Friction and Shear Interventions: HOB 30 degrees or less                Problem: Patient Education: Go to Patient Education Activity  Goal: Patient/Family Education  Outcome: Progressing Towards Goal     Problem: Hypertension  Goal: *Blood pressure within specified parameters  Outcome: Progressing Towards Goal  Goal: *Fluid volume balance  Outcome: Progressing Towards Goal  Goal: *Labs within defined limits  Outcome: Progressing Towards Goal     Problem: Patient Education: Go to Patient Education Activity  Goal: Patient/Family Education  Outcome: Progressing Towards Goal     Problem: Nutrition Deficit  Goal: *Optimize nutritional status  Outcome: Progressing Towards Goal     Problem: Patient Education: Go to Patient Education Activity  Goal: Patient/Family Education  Outcome: Progressing Towards Goal     Problem: Patient Education: Go to Patient Education Activity  Goal: Patient/Family Education  Outcome: Progressing Towards Goal     Problem: General Infection Care Plan (Adult and Pediatric)  Goal: Improvement in signs and symptoms of infection  Outcome: Progressing Towards Goal  Goal: *Optimize nutritional status  Outcome: Progressing Towards Goal     Problem: Patient Education: Go to Patient Education Activity  Goal: Patient/Family Education  Outcome: Progressing Towards Goal

## 2021-05-11 NOTE — PROGRESS NOTES
[facility] Hospitalist Group  Progress Note    Patient: Briseida Raya Age: 62 y.o. : 1960 MR#: [id number] SSN: [id number]  Date/Time: 2021    Subjective: Patient feels fine, no complaints. Pain worse during the dressing change and movement, otherwise okay. No palpitations, no chest pain. Assessment/Plan:           1. Bilateral lower extremity cellulitis, status post biopsy  2. Neutrophilic vasculitis, confirmed with biopsy. 3. A Flutter RVR, converted to sinus rhythm now. 4. SEPSIS with tachy and leukocytosis due to # 2, POA  5. Hypertension, BP elevated  6. Hyponatremia  7. Arthritis,? Rheumatoid per patient  8. Alcoholism  9. Tobacco abuse    Plan  1. Continue metoprolol, will increase amlodipine and monitor blood pressure. 2. Biopsy positive for vasculitis, will send ANCA, OPAL, anti-Garrett antibodies. Discussed with ID, no need for steroids for now. Patient will need rheumatology outpatient. 3. Cardiology input noted, no need for anticoagulation. 4. Continue antibiotics with Zosyn per ID.  5. Will reeval wounds tomorrow to see if patient is dependent. 6. Currently not showing signs of alcohol withdrawal  7. Continue thiamine  8. Continue pain medicines  9. Nephrology input noted, monitor sodium. 10. Discussed with vascular surgery, they did not do debridement and requesting for plastic surgery. 11. Continue PT OT  Discussed with RN    Discussed with the patient and also with his wife over the phone explained about my above plan of care. Both understood and agreed with my plan.     Case discussed with:  [x]Patient  [x]Family  [x]Nursing  []Case Management  DVT Prophylaxis:  [x]Lovenox  []Hep SQ  []SCDs  []Coumadin   []On Heparin gtt    Objective:   VS:   Visit Vitals  BP (!) 175/89 Comment: WILLIAM Sarmiento Notified    Pulse 85   Temp 98.3 °F (36.8 °C)   Resp 20   Ht 5' 11\" (1.803 m)   Wt 122.9 kg (271 lb)   SpO2 95%   BMI 37.80 kg/m²      Tmax/24hrs: Temp (24hrs), Av.3 °F (36.8 °C), Min:98.2 °F (36.8 °C), Max:98.5 °F (36.9 °C)    Input/Output:     Intake/Output Summary (Last 24 hours) at 2021 1545  Last data filed at 2021 6264  Gross per 24 hour   Intake 360 ml   Output 2050 ml   Net -1690 ml       General: Alert awake, no acute distress. Cardiovascular: regular rate and rhythm  Pulmonary:  CTA b/l  GI:  Soft, BS+, NT, ND  Extremities: Bilateral lower extremity dressing clean, patient did not let me open his dressing due to pain. Redness erythema slightly better, swelling improving, good ROM in ankle and knees. Alert awake on x3, moves all extremities. Labs:    Recent Results (from the past 24 hour(s))   METABOLIC PANEL, BASIC    Collection Time: 21  3:56 AM   Result Value Ref Range    Sodium 130 (L) 136 - 145 mmol/L    Potassium 4.0 3.5 - 5.5 mmol/L    Chloride 99 (L) 100 - 111 mmol/L    CO2 22 21 - 32 mmol/L    Anion gap 9 3.0 - 18 mmol/L    Glucose 103 (H) 74 - 99 mg/dL    BUN 20 (H) 7.0 - 18 MG/DL    Creatinine 1.14 0.6 - 1.3 MG/DL    BUN/Creatinine ratio 18 12 - 20      GFR est AA >60 >60 ml/min/1.73m2    GFR est non-AA >60 >60 ml/min/1.73m2    Calcium 8.0 (L) 8.5 - 10.1 MG/DL   CBC WITH AUTOMATED DIFF    Collection Time: 21  3:56 AM   Result Value Ref Range    WBC 12.6 4.6 - 13.2 K/uL    RBC 3.59 (L) 4.35 - 5.65 M/uL    HGB 10.9 (L) 13.0 - 16.0 g/dL    HCT 33.1 (L) 36.0 - 48.0 %    MCV 92.2 74.0 - 97.0 FL    MCH 30.4 24.0 - 34.0 PG    MCHC 32.9 31.0 - 37.0 g/dL    RDW 13.3 11.6 - 14.5 %    PLATELET 222 311 - 997 K/uL    MPV 8.8 (L) 9.2 - 11.8 FL    NEUTROPHILS 78 (H) 40 - 73 %    BAND NEUTROPHILS 5 0 - 5 %    LYMPHOCYTES 8 (L) 21 - 52 %    MONOCYTES 7 3 - 10 %    EOSINOPHILS 0 0 - 5 %    BASOPHILS 0 0 - 2 %    METAMYELOCYTES 2 (H) 0 %    ABS. NEUTROPHILS 10.5 (H) 1.8 - 8.0 K/UL    ABS. LYMPHOCYTES 1.0 0.9 - 3.6 K/UL    ABS. MONOCYTES 0.9 0.05 - 1.2 K/UL    ABS. EOSINOPHILS 0.0 0.0 - 0.4 K/UL    ABS.  BASOPHILS 0.0 0.0 - 0.1 K/UL    DF MANUAL      PLATELET COMMENTS ADEQUATE PLATELETS      RBC COMMENTS NORMOCYTIC, NORMOCHROMIC     MAGNESIUM    Collection Time: 05/11/21  3:56 AM   Result Value Ref Range    Magnesium 2.0 1.6 - 2.6 mg/dL     Additional Data Reviewed:      Signed By: Alexa Null MD     May 11, 2021

## 2021-05-11 NOTE — PROGRESS NOTES
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 - respiratory - stable  - hemodynamics - stable, no pressrs  - UOP-ok  - Nutrition -poor    Objective:     Visit Vitals  BP (!) 175/89 Comment: RN Guillermina Notified    Pulse 85   Temp 98.3 °F (36.8 °C)   Resp 20   Ht 5' 11\" (1.803 m)   Wt 122.9 kg (271 lb)   SpO2 95%   BMI 37.80 kg/m²         Intake/Output Summary (Last 24 hours) at 5/11/2021 1642  Last data filed at 5/11/2021 1604  Gross per 24 hour   Intake 1320 ml   Output 2050 ml   Net -730 ml       Physical Exam:     Patient is in no apparent distress. HEENT: Head is normocephalic and atraumatic. Neck: no cervical lymphadenopathy or thyromegaly. Lungs: good air entry, clear to auscultation bilaterally. Cardiovascular system: S1, S2, regular rate and rhythm. Abdomen: soft, non tender, non distended.    Extremities:meche lower ext erythema + ,meche bandages      Data Review:    Recent Labs     05/11/21  0356   WBC 12.6   RBC 3.59*   HCT 33.1*   MCV 92.2   MCH 30.4   MCHC 32.9   RDW 13.3     Recent Labs 05/11/21  0356 05/10/21  0240 05/09/21  0221   BUN 20* 21* 24*   CREA 1.14 1.08 1.02   CA 8.0* 7.7* 7.5*   K 4.0 4.6 4.3   * 130* 131*   CL 99* 99* 100   CO2 22 25 24   PHOS  --  3.5 3.8   * 106* 119*       Liss Rodriguez MD

## 2021-05-11 NOTE — PROGRESS NOTES
Problem: Falls - Risk of  Goal: *Absence of Falls  Description: Document Richard [place] Fall Risk and appropriate interventions in the flowsheet. Outcome: Progressing Towards Goal  Note: Fall Risk Interventions:  Mobility Interventions: Assess mobility with egress test         Medication Interventions: Bed/chair exit alarm         History of Falls Interventions: Bed/chair exit alarm         Problem: Patient Education: Go to Patient Education Activity  Goal: Patient/Family Education  Outcome: Progressing Towards Goal     Problem: Pressure Injury - Risk of  Goal: *Prevention of pressure injury  Description: Document Xavier Scale and appropriate interventions in the flowsheet. Outcome: Progressing Towards Goal  Note: Pressure Injury Interventions:       Moisture Interventions: Absorbent underpads, Minimize layers    Activity Interventions: Assess need for specialty bed, PT/OT evaluation    Mobility Interventions: Assess need for specialty bed, PT/OT evaluation, HOB 30 degrees or less    Nutrition Interventions: Document food/fluid/supplement intake    Friction and Shear Interventions: HOB 30 degrees or less                Problem: Patient Education: Go to Patient Education Activity  Goal: Patient/Family Education  Outcome: Progressing Towards Goal     Problem: Pain  Goal: *Control of Pain  Outcome: Progressing Towards Goal  Goal: *PALLIATIVE CARE:  Alleviation of Pain  Outcome: Progressing Towards Goal     Problem: Patient Education: Go to Patient Education Activity  Goal: Patient/Family Education  Outcome: Progressing Towards Goal     Problem: Impaired Skin Integrity/Pressure Injury Treatment  Goal: *Improvement of Existing Pressure Injury  Outcome: Progressing Towards Goal  Goal: *Prevention of pressure injury  Description: Document Xavier Scale and appropriate interventions in the flowsheet.   Outcome: Progressing Towards Goal  Note: Pressure Injury Interventions:       Moisture Interventions: Absorbent underpads, Minimize layers    Activity Interventions: Assess need for specialty bed, PT/OT evaluation    Mobility Interventions: Assess need for specialty bed, PT/OT evaluation, HOB 30 degrees or less    Nutrition Interventions: Document food/fluid/supplement intake    Friction and Shear Interventions: HOB 30 degrees or less                Problem: Patient Education: Go to Patient Education Activity  Goal: Patient/Family Education  Outcome: Progressing Towards Goal     Problem: Hypertension  Goal: *Blood pressure within specified parameters  Outcome: Progressing Towards Goal  Goal: *Fluid volume balance  Outcome: Progressing Towards Goal  Goal: *Labs within defined limits  Outcome: Progressing Towards Goal     Problem: Patient Education: Go to Patient Education Activity  Goal: Patient/Family Education  Outcome: Progressing Towards Goal     Problem: Nutrition Deficit  Goal: *Optimize nutritional status  Outcome: Progressing Towards Goal     Problem: Patient Education: Go to Patient Education Activity  Goal: Patient/Family Education  Outcome: Progressing Towards Goal     Problem: Patient Education: Go to Patient Education Activity  Goal: Patient/Family Education  Outcome: Progressing Towards Goal     Problem: General Infection Care Plan (Adult and Pediatric)  Goal: Improvement in signs and symptoms of infection  Outcome: Progressing Towards Goal  Goal: *Optimize nutritional status  Outcome: Progressing Towards Goal     Problem: Patient Education: Go to Patient Education Activity  Goal: Patient/Family Education  Outcome: Progressing Towards Goal

## 2021-05-11 NOTE — PROGRESS NOTES
CNA informed this nurse of patient's elevated BP of 175/89. Notified Dr Deyanira Sosa that I will administer PRN Hydralazine. No new verbal orders.

## 2021-05-11 NOTE — PROGRESS NOTES
Bedside shift change report given to Josselyn Johnston (oncoming nurse) by Zarina Zuniga RN (offgoing nurse). Report included the following information SBAR, Kardex, Intake/Output, MAR and Recent Results.

## 2021-05-11 NOTE — PROGRESS NOTES
Interdisciplinary Round Note   Patient Information: Patient Information: Cayla Ruiz                                      762/21   Reason for Admission: Sepsis [facility]) [A41.9]   Attending Provider:   Charolett Brunner   Past Medical History: History reviewed. No pertinent past medical history. Hospital day: 6  Estimated discharge date: TBD  RRAT Score: Low Risk            5       Total Score        2 . Living with Significant Other. Assisted Living. LTAC. SNF. or   Rehab    3 Patient Length of Stay (>5 days = 3)        Criteria that do not apply:    Has Seen PCP in Last 6 Months (Yes=3, No=0)    IP Visits Last 12 Months (1-3=4, 4=9, >4=11)    Pt.  Coverage (Medicare=5 , Medicaid, or Self-Pay=4)    Charlson Comorbidity Score (Age + Comorbid Conditions)       Goals for Today:            VITAL SIGNS  Vitals:    05/11/21 0632 05/11/21 0746 05/11/21 1118 05/11/21 1539   BP: (!) 162/92 (!) 173/84 (!) 171/86 (!) 175/89   Pulse:  91 83 85   Resp:  20 20 20   Temp:  98.3 °F (36.8 °C) 98.3 °F (36.8 °C) 98.3 °F (36.8 °C)   SpO2:  97% 97% 95%   Weight:       Height:              Lines, Drains, & Airways    Peripheral IV 05/10/21 Posterior;Right Hand-Site Assessment: Clean, dry, & intact  [REMOVED] Peripheral IV 05/05/21 Left Antecubital-Site Assessment: Clean, dry, & intact  [REMOVED] Peripheral IV 05/07/21 Anterior;Left;Proximal Forearm-Site Assessment: Clean, dry, & intact  [REMOVED] Peripheral IV 05/05/21 Right Wrist-Site Assessment: Clean, dry, & intact       VTE Prophylaxis               Sequential/Intermittent Compression Device: Bilateral               Patient Refused VTE Prophylaxis: Yes   Intake and Output:   05/09 1901 - 05/11 0700  In: 1200 [P.O.:1200]  Out: 4850 [Urine:4850]  05/11 0701 - 05/11 1900  In: 960 [P.O.:960]  Out: -  Current Diet: DIET NUTRITIONAL SUPPLEMENTS Breakfast, Lunch; Glucerna Shake  DIET REGULAR FR 1200ML       Abdominal   Last Bowel Movement Date: 05/06/21  Abdominal Assessment: Obese  Appetite: Good     Recent Glucose Results:   Lab Results   Component Value Date/Time     (H) 05/11/2021 03:56 AM          IV Antibiotics? Zosyn         Activity Level: Activity Level: Up with Assistance  Needs assistance with ADLs: no  PT Consult Status: YES Current Immunizations: There is no immunization history on file for this patient.    Recommendations:   Discharge Disposition: Home    COVID status: Unknown    Will the patient require COVID testing prior to discharge for placement?: NO    Medication Reconciliation Completed: YES    Cardiac/Pulmonary Rehab Ordered:  NO    Needs for Discharge: TBD Recommendations from IDR team: No new needs at this time

## 2021-05-11 NOTE — PROGRESS NOTES
Infectious Disease  progress Note        Reason: Bilateral leg cellulitis    Current abx Prior abx   Ceftriaxone, vancomycin since 5/5/2021      Lines:       Assessment :    80-year-old man with past medical history significant for obesity, upper extremity cellulitis 4 years ago, arthritis admitted to [facility] on 5/5/2021 with increasing erythema/pain bilateral legs for several weeks. status post steroid injection in both knees on 3/18/21  H/o tick bite on abdomen around 4/18/21    Now with 5 week h/o increasing bilateral leg swelling, 3 week h/o increasing redness/pain bilateral legs with blackish discoloration of legs, cyanotic appearance of feet    Patient presents with a highly complex clinical picture. Clinical presentation c/w acute gangrenous cellulitis of both legs- infection superimposed on underlying vasculitis. Wound culture 5/7- pseudomonas putida, enterococcus, group B streptococcus, Staphylococcus intermedius-susceptibilities reviewed    Status post skin biopsy of lower extremity ulcer on 5/7/2021- afb stain negative. Fungal cultures, histology suggestive of neutrophilic vasculitis. Negative fungal/AFB stain, cultures 5/7/2021    Decreased erythema and warmth noted on today's exam    Recommendations:    1.  continue piperacillin/tazobactam  2. Will need rheumatology evaluation for vasculitis. Recommend plastic surgery evaluation to determine need for debridement of necrotic tissue/graft closure  3. Will reexamine bilateral lower extremity wounds in a.m. to determine if safe for patient to follow-up with plastic surgery as outpatient-primary team discussed case with vascular surgery who has recommended plastic surgery evaluation for debridement. Above plan was discussed in details with patient, RN and dr Subhash Burgess. Please call me if any further questions or concerns. Will continue to participate in the care of this patient. HPI:    Feels better.   Improved lower extremity pain/itching        home Medication List    Details   ibuprofen (MOTRIN IB) 200 mg tablet Take  by mouth.      meloxicam (MOBIC) 15 mg tablet Take 1 Tab by mouth daily (with breakfast). Qty: 30 Tab, Refills: 1    Associated Diagnoses: Cellulitis of right hand      naproxen (NAPROSYN) 375 mg tablet Take 1 Tab by mouth two (2) times daily (with meals). Qty: 20 Tab, Refills: 0      traMADol (ULTRAM) 50 mg tablet Take 1 Tab by mouth every six (6) hours as needed for Pain. Max Daily Amount: 200 mg.   Qty: 12 Tab, Refills: 0    Associated Diagnoses: Arthritis of right hand             Current Facility-Administered Medications   Medication Dose Route Frequency    metoprolol tartrate (LOPRESSOR) tablet 100 mg  100 mg Oral Q12H    piperacillin-tazobactam (ZOSYN) 4.5 g in 0.9% sodium chloride (MBP/ADV) 100 mL MBP  4.5 g IntraVENous Q6H    amLODIPine (NORVASC) tablet 5 mg  5 mg Oral DAILY    morphine injection 2 mg  2 mg IntraVENous Q4H PRN    HYDROcodone-acetaminophen (NORCO) 5-325 mg per tablet 2 Tab  2 Tab Oral Q6H PRN    aspirin delayed-release tablet 81 mg  81 mg Oral DAILY    sodium chloride (NS) flush 5-40 mL  5-40 mL IntraVENous Q8H    sodium chloride (NS) flush 5-40 mL  5-40 mL IntraVENous PRN    acetaminophen (TYLENOL) tablet 650 mg  650 mg Oral Q6H PRN    Or    acetaminophen (TYLENOL) suppository 650 mg  650 mg Rectal Q6H PRN    polyethylene glycol (MIRALAX) packet 17 g  17 g Oral DAILY PRN    promethazine (PHENERGAN) tablet 12.5 mg  12.5 mg Oral Q6H PRN    Or    ondansetron (ZOFRAN) injection 4 mg  4 mg IntraVENous Q6H PRN    enoxaparin (LOVENOX) injection 40 mg  40 mg SubCUTAneous DAILY    diphenhydrAMINE (BENADRYL) capsule 50 mg  50 mg Oral Q6H PRN    hydrALAZINE (APRESOLINE) 20 mg/mL injection 10 mg  10 mg IntraVENous Q6H PRN    sodium chloride (NS) flush 5-40 mL  5-40 mL IntraVENous Q8H    sodium chloride (NS) flush 5-40 mL  5-40 mL IntraVENous PRN    LORazepam (ATIVAN) tablet 1 mg  1 mg Oral Q1H PRN    Or    LORazepam (ATIVAN) injection 1 mg  1 mg IntraVENous Q1H PRN    LORazepam (ATIVAN) tablet 2 mg  2 mg Oral Q1H PRN    Or    LORazepam (ATIVAN) injection 2 mg  2 mg IntraVENous Q1H PRN    LORazepam (ATIVAN) injection 3 mg  3 mg IntraVENous Q15MIN PRN    thiamine HCL (B-1) tablet 100 mg  100 mg Oral DAILY    folic acid (FOLVITE) tablet 1 mg  1 mg Oral DAILY    therapeutic multivitamin (THERAGRAN) tablet 1 Tab  1 Tab Oral DAILY       Allergies: Patient has no known allergies. Temp (24hrs), Av.2 °F (36.8 °C), Min:97.5 °F (36.4 °C), Max:98.5 °F (36.9 °C)    Visit Vitals  BP (!) 173/84   Pulse 91   Temp 98.3 °F (36.8 °C)   Resp 20   Ht 5' 11\" (1.803 m)   Wt 122.9 kg (271 lb)   SpO2 97%   BMI 37.80 kg/m²       ROS: 12 point ROS obtained in details. Pertinent positives as mentioned in HPI,   otherwise negative    Physical Exam:    Vitals signs and nursing note reviewed. Constitutional:          Appearance: He is well-developed. obese. Laying on the bed, appears more comfortable than prior exam   HENT:      Head: Normocephalic and atraumatic. Eyes:      General: No scleral icterus. Conjunctiva/sclera: Conjunctivae normal.   Neck:      Musculoskeletal: Normal range of motion and neck supple. Vascular: No JVD. Cardiovascular:      Rate and Rhythm: Normal rate and regular rhythm. Heart sounds: Normal heart sounds. Pulmonary:      Effort: Pulmonary effort is normal.      Breath sounds: Normal breath sounds. Abdominal:      Palpations: Abdomen is soft. There is no mass. Tenderness: There is no abdominal tenderness. Musculoskeletal: Normal range of motion. Bilateral leg ulcers wrapped in dressing-not opened per patient's request  Lymphadenopathy:      Cervical: No cervical adenopathy. Skin:     General: Skin is warm and dry.    Neurological:      Mental Status: He is alert.            Labs: Results:   Chemistry Recent Labs     21  0356 05/10/21  0243 05/09/21 0221   * 106* 119*   * 130* 131*   K 4.0 4.6 4.3   CL 99* 99* 100   CO2 22 25 24   BUN 20* 21* 24*   CREA 1.14 1.08 1.02   CA 8.0* 7.7* 7.5*   AGAP 9 6 7   BUCR 18 19 24*      CBC w/Diff Recent Labs     05/11/21  0356 05/10/21  0240 05/09/21 0221   WBC 12.6 12.0 11.5   RBC 3.59* 3.60* 3.47*   HGB 10.9* 10.9* 10.8*   HCT 33.1* 33.6* 32.2*    406 381   GRANS 78* 85* 76*   LYMPH 8* 5* 10*   EOS 0 1 0      Microbiology No results for input(s): CULT in the last 72 hours. RADIOLOGY:    All available imaging studies/reports in [facility] care for this admission were reviewed      Disclaimer: Sections of this note are dictated utilizing voice recognition software, which may have resulted in some phonetic based errors in grammar and contents. Even though attempts were made to correct all the mistakes, some may have been missed, and remained in the body of the document. If questions arise, please contact our department.     Dr. Madie Ling, Infectious Disease Specialist  [phone]  May 11, 2021  9:27 AM

## 2021-05-12 LAB
ANION GAP SERPL CALC-SCNC: 7 MMOL/L (ref 3–18)
BASOPHILS # BLD: 0.1 K/UL (ref 0–0.1)
BASOPHILS NFR BLD: 1 % (ref 0–2)
BUN SERPL-MCNC: 26 MG/DL (ref 7–18)
BUN/CREAT SERPL: 20 (ref 12–20)
CALCIUM SERPL-MCNC: 8.9 MG/DL (ref 8.5–10.1)
CHLORIDE SERPL-SCNC: 98 MMOL/L (ref 100–111)
CO2 SERPL-SCNC: 29 MMOL/L (ref 21–32)
CREAT SERPL-MCNC: 1.28 MG/DL (ref 0.6–1.3)
DIFFERENTIAL METHOD BLD: ABNORMAL
EOSINOPHIL # BLD: 0.4 K/UL (ref 0–0.4)
EOSINOPHIL NFR BLD: 4 % (ref 0–5)
ERYTHROCYTE [DISTWIDTH] IN BLOOD BY AUTOMATED COUNT: 13.3 % (ref 11.6–14.5)
GLUCOSE SERPL-MCNC: 101 MG/DL (ref 74–99)
HCT VFR BLD AUTO: 35.1 % (ref 36–48)
HGB BLD-MCNC: 11.5 G/DL (ref 13–16)
LYMPHOCYTES # BLD: 1.1 K/UL (ref 0.9–3.6)
LYMPHOCYTES NFR BLD: 12 % (ref 21–52)
MCH RBC QN AUTO: 30.4 PG (ref 24–34)
MCHC RBC AUTO-ENTMCNC: 32.8 G/DL (ref 31–37)
MCV RBC AUTO: 92.9 FL (ref 74–97)
MONOCYTES # BLD: 0.9 K/UL (ref 0.05–1.2)
MONOCYTES NFR BLD: 10 % (ref 3–10)
NEUTS SEG # BLD: 6.5 K/UL (ref 1.8–8)
NEUTS SEG NFR BLD: 69 % (ref 40–73)
PLATELET # BLD AUTO: 412 K/UL (ref 135–420)
PMV BLD AUTO: 9.1 FL (ref 9.2–11.8)
POTASSIUM SERPL-SCNC: 3.9 MMOL/L (ref 3.5–5.5)
RBC # BLD AUTO: 3.78 M/UL (ref 4.35–5.65)
SODIUM SERPL-SCNC: 134 MMOL/L (ref 136–145)
WBC # BLD AUTO: 9.3 K/UL (ref 4.6–13.2)

## 2021-05-12 PROCEDURE — 99232 SBSQ HOSP IP/OBS MODERATE 35: CPT | Performed by: HOSPITALIST

## 2021-05-12 PROCEDURE — 85025 COMPLETE CBC W/AUTO DIFF WBC: CPT

## 2021-05-12 PROCEDURE — 74011250637 HC RX REV CODE- 250/637: Performed by: INTERNAL MEDICINE

## 2021-05-12 PROCEDURE — 74011250637 HC RX REV CODE- 250/637: Performed by: FAMILY MEDICINE

## 2021-05-12 PROCEDURE — 74011250637 HC RX REV CODE- 250/637: Performed by: HOSPITALIST

## 2021-05-12 PROCEDURE — 77030041076 HC DRSG AG OPTICELL MDII -A

## 2021-05-12 PROCEDURE — 74011250637 HC RX REV CODE- 250/637: Performed by: PHYSICIAN ASSISTANT

## 2021-05-12 PROCEDURE — 2709999900 HC NON-CHARGEABLE SUPPLY

## 2021-05-12 PROCEDURE — 74011000258 HC RX REV CODE- 258: Performed by: INTERNAL MEDICINE

## 2021-05-12 PROCEDURE — 36415 COLL VENOUS BLD VENIPUNCTURE: CPT

## 2021-05-12 PROCEDURE — 74011250636 HC RX REV CODE- 250/636: Performed by: HOSPITALIST

## 2021-05-12 PROCEDURE — 65270000029 HC RM PRIVATE

## 2021-05-12 PROCEDURE — 80048 BASIC METABOLIC PNL TOTAL CA: CPT

## 2021-05-12 PROCEDURE — 74011250636 HC RX REV CODE- 250/636: Performed by: FAMILY MEDICINE

## 2021-05-12 PROCEDURE — 74011250636 HC RX REV CODE- 250/636: Performed by: INTERNAL MEDICINE

## 2021-05-12 RX ORDER — HYDRALAZINE HYDROCHLORIDE 25 MG/1
25 TABLET, FILM COATED ORAL EVERY 8 HOURS
Status: DISCONTINUED | OUTPATIENT
Start: 2021-05-12 | End: 2021-05-13

## 2021-05-12 RX ORDER — AMOXICILLIN AND CLAVULANATE POTASSIUM 875; 125 MG/1; MG/1
1 TABLET, FILM COATED ORAL EVERY 12 HOURS
Status: DISCONTINUED | OUTPATIENT
Start: 2021-05-12 | End: 2021-05-13 | Stop reason: HOSPADM

## 2021-05-12 RX ORDER — HYDRALAZINE HYDROCHLORIDE 25 MG/1
25 TABLET, FILM COATED ORAL 3 TIMES DAILY
Status: DISCONTINUED | OUTPATIENT
Start: 2021-05-12 | End: 2021-05-12

## 2021-05-12 RX ORDER — CIPROFLOXACIN 500 MG/1
750 TABLET ORAL EVERY 12 HOURS
Status: DISCONTINUED | OUTPATIENT
Start: 2021-05-12 | End: 2021-05-13 | Stop reason: HOSPADM

## 2021-05-12 RX ADMIN — Medication 10 ML: at 19:00

## 2021-05-12 RX ADMIN — PIPERACILLIN SODIUM AND TAZOBACTAM SODIUM 4.5 G: 4; .5 INJECTION, POWDER, LYOPHILIZED, FOR SOLUTION INTRAVENOUS at 05:39

## 2021-05-12 RX ADMIN — ENOXAPARIN SODIUM 40 MG: 40 INJECTION SUBCUTANEOUS at 09:45

## 2021-05-12 RX ADMIN — AMOXICILLIN AND CLAVULANATE POTASSIUM 1 TABLET: 875; 125 TABLET, FILM COATED ORAL at 21:39

## 2021-05-12 RX ADMIN — MORPHINE SULFATE 2 MG: 2 INJECTION, SOLUTION INTRAMUSCULAR; INTRAVENOUS at 11:04

## 2021-05-12 RX ADMIN — HYDRALAZINE HYDROCHLORIDE 25 MG: 25 TABLET, FILM COATED ORAL at 21:39

## 2021-05-12 RX ADMIN — METOPROLOL TARTRATE 100 MG: 50 TABLET, FILM COATED ORAL at 21:39

## 2021-05-12 RX ADMIN — ASPIRIN 81 MG: 81 TABLET, COATED ORAL at 09:43

## 2021-05-12 RX ADMIN — Medication 10 ML: at 21:40

## 2021-05-12 RX ADMIN — Medication 10 ML: at 05:39

## 2021-05-12 RX ADMIN — DIPHENHYDRAMINE HYDROCHLORIDE 50 MG: 25 CAPSULE ORAL at 21:40

## 2021-05-12 RX ADMIN — THERA TABS 1 TABLET: TAB at 09:42

## 2021-05-12 RX ADMIN — Medication 100 MG: at 09:43

## 2021-05-12 RX ADMIN — Medication 10 ML: at 06:00

## 2021-05-12 RX ADMIN — HYDRALAZINE HYDROCHLORIDE 25 MG: 25 TABLET, FILM COATED ORAL at 14:46

## 2021-05-12 RX ADMIN — AMLODIPINE BESYLATE 10 MG: 10 TABLET ORAL at 09:42

## 2021-05-12 RX ADMIN — Medication 10 ML: at 22:00

## 2021-05-12 RX ADMIN — DIPHENHYDRAMINE HYDROCHLORIDE 50 MG: 25 CAPSULE ORAL at 05:39

## 2021-05-12 RX ADMIN — PIPERACILLIN SODIUM AND TAZOBACTAM SODIUM 4.5 G: 4; .5 INJECTION, POWDER, LYOPHILIZED, FOR SOLUTION INTRAVENOUS at 11:09

## 2021-05-12 RX ADMIN — HYDROCODONE BITARTRATE AND ACETAMINOPHEN 2 TABLET: 5; 325 TABLET ORAL at 09:42

## 2021-05-12 RX ADMIN — CIPROFLOXACIN 750 MG: 500 TABLET, FILM COATED ORAL at 18:58

## 2021-05-12 RX ADMIN — METOPROLOL TARTRATE 100 MG: 50 TABLET, FILM COATED ORAL at 09:42

## 2021-05-12 RX ADMIN — FOLIC ACID 1 MG: 1 TABLET ORAL at 09:43

## 2021-05-12 NOTE — PROGRESS NOTES
Per Maite Cabral called Dr. Delmar Dee office at [phone] and spoke with Horace Meredith in order to schedule a new patient appointment, patient must have a referral along with faxing all information to [phone] once received the requested information their office will call patient and schedule an appointment. Informed Maite Cabral of conversation with Horace Meredith in Dr. Monserrat Monroe office.

## 2021-05-12 NOTE — PROGRESS NOTES
[facility]  Progress Note    Patient: Boone Alamo Age: 62 y.o. : 1960 MR#: [id number] SSN: [id number]  Date/Time: 2021    Subjective: Patient feels fine, Pain worse during the dressing change and movement, otherwise okay. No palpitations, no chest pain. Assessment/Plan:           1. Bilateral lower extremity cellulitis with polymicrobial infection, status post biopsy  2. Neutrophilic vasculitis, confirmed with biopsy. 3. A Flutter RVR, converted to sinus rhythm now. 4. SEPSIS with tachy and leukocytosis due to # 2, POA  5. Hypertension, BP elevated  6. Hyponatremia improved  7. Arthritis,? Rheumatoid per patient  8. Alcoholism  9. Tobacco abuse    Plan  1. Continue metoprolol, amlodipine and hydralazine, monitor blood pressure. 2. Biopsy positive for vasculitis, pending ANCA, OPAL, anti-Garrett antibodies. Discussed with ID, will hold off steroids due to cellulitis for now. Patient will need rheumatology outpatient. Discussed with case management, working with the patient to get an appointment with rheumatology. 3. Discussed with ID, no need for debridement. 4. Cardiology input noted, no need for anticoagulation. 5. Continue antibiotics with Zosyn per ID. Will transition to p.o. at discharge. 6. Currently not showing signs of alcohol withdrawal  7. Continue thiamine  8. Continue pain medicines  9. Nephrology input noted, monitor sodium. 10. Continue PT OT  Discussed with RN  Discussed with case management to set up an appointment with dermatology and PCP. Home health care will be set up. Will plan for discharge home with home health care tomorrow if remains stable. Discussed with the patient and also with his wife over the phone explained about my above plan of care including discharge plan to home with home health care, follow-up with PCP and rheumatology. Both understood and agreed with my plan.     Case discussed with:  [x]Patient [x]Family  [x]Nursing  []Case Management  DVT Prophylaxis:  [x]Lovenox  []Hep SQ  []SCDs  []Coumadin   []On Heparin gtt    Objective:   VS:   Visit Vitals  BP (!) 163/75   Pulse 87   Temp 97.6 °F (36.4 °C)   Resp 20   Ht 5' 11\" (1.803 m)   Wt 122.9 kg (271 lb)   SpO2 96%   BMI 37.80 kg/m²      Tmax/24hrs: Temp (24hrs), Av.2 °F (36.8 °C), Min:97.6 °F (36.4 °C), Max:98.7 °F (37.1 °C)    Input/Output:     Intake/Output Summary (Last 24 hours) at 2021 1309  Last data filed at 2021 1249  Gross per 24 hour   Intake 990 ml   Output 2500 ml   Net -1510 ml       General: Alert awake, no acute distress. Cardiovascular: regular rate and rhythm  Pulmonary:  CTA b/l  GI:  Soft, BS+, NT, ND  Extremities: Bilateral lower extremity diffuse wound with granulation tissue, peeled off skin with some edges of skin necrosis, redness and erythema significantly better. Alert awake on x3, moves all extremities.     Labs:    Recent Results (from the past 24 hour(s))   METABOLIC PANEL, BASIC    Collection Time: 21  2:52 AM   Result Value Ref Range    Sodium 134 (L) 136 - 145 mmol/L    Potassium 3.9 3.5 - 5.5 mmol/L    Chloride 98 (L) 100 - 111 mmol/L    CO2 29 21 - 32 mmol/L    Anion gap 7 3.0 - 18 mmol/L    Glucose 101 (H) 74 - 99 mg/dL    BUN 26 (H) 7.0 - 18 MG/DL    Creatinine 1.28 0.6 - 1.3 MG/DL    BUN/Creatinine ratio 20 12 - 20      GFR est AA >60 >60 ml/min/1.73m2    GFR est non-AA 58 (L) >60 ml/min/1.73m2    Calcium 8.9 8.5 - 10.1 MG/DL   CBC WITH AUTOMATED DIFF    Collection Time: 21  2:52 AM   Result Value Ref Range    WBC 9.3 4.6 - 13.2 K/uL    RBC 3.78 (L) 4.35 - 5.65 M/uL    HGB 11.5 (L) 13.0 - 16.0 g/dL    HCT 35.1 (L) 36.0 - 48.0 %    MCV 92.9 74.0 - 97.0 FL    MCH 30.4 24.0 - 34.0 PG    MCHC 32.8 31.0 - 37.0 g/dL    RDW 13.3 11.6 - 14.5 %    PLATELET 268 015 - 446 K/uL    MPV 9.1 (L) 9.2 - 11.8 FL    NEUTROPHILS 69 40 - 73 %    LYMPHOCYTES 12 (L) 21 - 52 %    MONOCYTES 10 3 - 10 %    EOSINOPHILS 4 0 - 5 %    BASOPHILS 1 0 - 2 %    ABS. NEUTROPHILS 6.5 1.8 - 8.0 K/UL    ABS. LYMPHOCYTES 1.1 0.9 - 3.6 K/UL    ABS. MONOCYTES 0.9 0.05 - 1.2 K/UL    ABS. EOSINOPHILS 0.4 0.0 - 0.4 K/UL    ABS.  BASOPHILS 0.1 0.0 - 0.1 K/UL    DF AUTOMATED       Additional Data Reviewed:      Signed By: Vanessa Beard MD     May 12, 2021

## 2021-05-12 NOTE — PROGRESS NOTES
Problem: Falls - Risk of  Goal: *Absence of Falls  Description: Document Carlos Duane Fall Risk and appropriate interventions in the flowsheet. Outcome: Progressing Towards Goal  Note: Fall Risk Interventions:  Mobility Interventions: Bed/chair exit alarm         Medication Interventions: Bed/chair exit alarm         History of Falls Interventions: Bed/chair exit alarm         Problem: Patient Education: Go to Patient Education Activity  Goal: Patient/Family Education  Outcome: Progressing Towards Goal     Problem: Pressure Injury - Risk of  Goal: *Prevention of pressure injury  Description: Document Xavier Scale and appropriate interventions in the flowsheet. Outcome: Progressing Towards Goal  Note: Pressure Injury Interventions:  Sensory Interventions: Assess changes in LOC    Moisture Interventions: Maintain skin hydration (lotion/cream)    Activity Interventions: Increase time out of bed    Mobility Interventions: Assess need for specialty bed    Nutrition Interventions: Document food/fluid/supplement intake    Friction and Shear Interventions: HOB 30 degrees or less                Problem: Patient Education: Go to Patient Education Activity  Goal: Patient/Family Education  Outcome: Progressing Towards Goal     Problem: Pain  Goal: *Control of Pain  Outcome: Progressing Towards Goal  Goal: *PALLIATIVE CARE:  Alleviation of Pain  Outcome: Progressing Towards Goal     Problem: Patient Education: Go to Patient Education Activity  Goal: Patient/Family Education  Outcome: Progressing Towards Goal     Problem: Impaired Skin Integrity/Pressure Injury Treatment  Goal: *Improvement of Existing Pressure Injury  Outcome: Progressing Towards Goal  Goal: *Prevention of pressure injury  Description: Document Xavier Scale and appropriate interventions in the flowsheet.   Outcome: Progressing Towards Goal  Note: Pressure Injury Interventions:  Sensory Interventions: Assess changes in LOC    Moisture Interventions: Maintain skin hydration (lotion/cream)    Activity Interventions: Increase time out of bed    Mobility Interventions: Assess need for specialty bed    Nutrition Interventions: Document food/fluid/supplement intake    Friction and Shear Interventions: HOB 30 degrees or less                Problem: Patient Education: Go to Patient Education Activity  Goal: Patient/Family Education  Outcome: Progressing Towards Goal     Problem: Hypertension  Goal: *Blood pressure within specified parameters  Outcome: Progressing Towards Goal  Goal: *Fluid volume balance  Outcome: Progressing Towards Goal  Goal: *Labs within defined limits  Outcome: Progressing Towards Goal     Problem: Patient Education: Go to Patient Education Activity  Goal: Patient/Family Education  Outcome: Progressing Towards Goal     Problem: Nutrition Deficit  Goal: *Optimize nutritional status  Outcome: Progressing Towards Goal     Problem: Patient Education: Go to Patient Education Activity  Goal: Patient/Family Education  Outcome: Progressing Towards Goal     Problem: Patient Education: Go to Patient Education Activity  Goal: Patient/Family Education  Outcome: Progressing Towards Goal     Problem: General Infection Care Plan (Adult and Pediatric)  Goal: Improvement in signs and symptoms of infection  Outcome: Progressing Towards Goal  Goal: *Optimize nutritional status  Outcome: Progressing Towards Goal     Problem: Patient Education: Go to Patient Education Activity  Goal: Patient/Family Education  Outcome: Progressing Towards Goal

## 2021-05-12 NOTE — PROGRESS NOTES
Infectious Disease  progress Note        Reason: Bilateral leg cellulitis    Current abx Prior abx     Zosyn since 5/10 Ceftriaxone 5/5-5/6  Cefepime 5/7-5/10   vancomycin 5/5/2021-5/10     Lines:       Assessment :    18-year-old man with past medical history significant for obesity, upper extremity cellulitis 4 years ago, arthritis admitted to [facility] on 5/5/2021 with increasing erythema/pain bilateral legs for several weeks. status post steroid injection in both knees on 3/18/21  H/o tick bite on abdomen around 4/18/21    Now with 5 week h/o increasing bilateral leg swelling, 3 week h/o increasing redness/pain bilateral legs with blackish discoloration of legs, cyanotic appearance of feet    Patient presents with a highly complex clinical picture. Clinical presentation c/w acute gangrenous cellulitis of both legs- infection superimposed on underlying vasculitis. Wound culture 5/7- pseudomonas putida, enterococcus, group B streptococcus, Staphylococcus intermedius-susceptibilities reviewed    Status post skin biopsy of lower extremity ulcer on 5/7/2021- afb stain negative. Fungal cultures, histology suggestive of neutrophilic vasculitis. Negative fungal/AFB stain, cultures 5/7/2021    Decreased erythema and warmth noted on today's exam.  Most of the necrotic tissue has now sloughed off. Wounds seem to be healing    Recommendations:    1. D/c piperacillin/tazobactam. Start po ciprofloxacin, augmentin till 5/20/21  2. Will need rheumatology evaluation for vasculitis. 3.  Continue local wound care     Above plan was discussed in details with patient, RN and dr Darinel Salazar. Please call me if any further questions or concerns. Will continue to participate in the care of this patient. HPI:    Feels better.   Improved lower extremity pain/itching        home Medication List    Details   ibuprofen (MOTRIN IB) 200 mg tablet Take  by mouth.      meloxicam (MOBIC) 15 mg tablet Take 1 Tab by mouth daily (with breakfast). Qty: 30 Tab, Refills: 1    Associated Diagnoses: Cellulitis of right hand      naproxen (NAPROSYN) 375 mg tablet Take 1 Tab by mouth two (2) times daily (with meals). Qty: 20 Tab, Refills: 0      traMADol (ULTRAM) 50 mg tablet Take 1 Tab by mouth every six (6) hours as needed for Pain. Max Daily Amount: 200 mg.   Qty: 12 Tab, Refills: 0    Associated Diagnoses: Arthritis of right hand             Current Facility-Administered Medications   Medication Dose Route Frequency    amLODIPine (NORVASC) tablet 10 mg  10 mg Oral DAILY    metoprolol tartrate (LOPRESSOR) tablet 100 mg  100 mg Oral Q12H    piperacillin-tazobactam (ZOSYN) 4.5 g in 0.9% sodium chloride (MBP/ADV) 100 mL MBP  4.5 g IntraVENous Q6H    morphine injection 2 mg  2 mg IntraVENous Q4H PRN    HYDROcodone-acetaminophen (NORCO) 5-325 mg per tablet 2 Tab  2 Tab Oral Q6H PRN    aspirin delayed-release tablet 81 mg  81 mg Oral DAILY    sodium chloride (NS) flush 5-40 mL  5-40 mL IntraVENous Q8H    sodium chloride (NS) flush 5-40 mL  5-40 mL IntraVENous PRN    acetaminophen (TYLENOL) tablet 650 mg  650 mg Oral Q6H PRN    Or    acetaminophen (TYLENOL) suppository 650 mg  650 mg Rectal Q6H PRN    polyethylene glycol (MIRALAX) packet 17 g  17 g Oral DAILY PRN    promethazine (PHENERGAN) tablet 12.5 mg  12.5 mg Oral Q6H PRN    Or    ondansetron (ZOFRAN) injection 4 mg  4 mg IntraVENous Q6H PRN    enoxaparin (LOVENOX) injection 40 mg  40 mg SubCUTAneous DAILY    diphenhydrAMINE (BENADRYL) capsule 50 mg  50 mg Oral Q6H PRN    hydrALAZINE (APRESOLINE) 20 mg/mL injection 10 mg  10 mg IntraVENous Q6H PRN    sodium chloride (NS) flush 5-40 mL  5-40 mL IntraVENous Q8H    sodium chloride (NS) flush 5-40 mL  5-40 mL IntraVENous PRN    LORazepam (ATIVAN) tablet 1 mg  1 mg Oral Q1H PRN    Or    LORazepam (ATIVAN) injection 1 mg  1 mg IntraVENous Q1H PRN    LORazepam (ATIVAN) tablet 2 mg  2 mg Oral Q1H PRN    Or    LORazepam (ATIVAN) injection 2 mg  2 mg IntraVENous Q1H PRN    LORazepam (ATIVAN) injection 3 mg  3 mg IntraVENous Q15MIN PRN    thiamine HCL (B-1) tablet 100 mg  100 mg Oral DAILY    folic acid (FOLVITE) tablet 1 mg  1 mg Oral DAILY    therapeutic multivitamin (THERAGRAN) tablet 1 Tab  1 Tab Oral DAILY       Allergies: Patient has no known allergies. Temp (24hrs), Av.3 °F (36.8 °C), Min:98.1 °F (36.7 °C), Max:98.7 °F (37.1 °C)    Visit Vitals  BP (!) 191/93 (BP 1 Location: Right lower arm, BP Patient Position: At rest)   Pulse 90   Temp 98.3 °F (36.8 °C)   Resp 20   Ht 5' 11\" (1.803 m)   Wt 122.9 kg (271 lb)   SpO2 100%   BMI 37.80 kg/m²       ROS: 12 point ROS obtained in details. Pertinent positives as mentioned in HPI,   otherwise negative    Physical Exam:    Vitals signs and nursing note reviewed. Constitutional:          Appearance: He is well-developed. obese. Laying on the bed, appears more comfortable than prior exam   HENT:      Head: Normocephalic and atraumatic. Eyes:      General: No scleral icterus. Conjunctiva/sclera: Conjunctivae normal.   Neck:      Musculoskeletal: Normal range of motion and neck supple. Vascular: No JVD. Cardiovascular:      Rate and Rhythm: Normal rate and regular rhythm. Heart sounds: Normal heart sounds. Pulmonary:      Effort: Pulmonary effort is normal.      Breath sounds: Normal breath sounds. Abdominal:      Palpations: Abdomen is soft. There is no mass. Tenderness: There is no abdominal tenderness. Musculoskeletal: Normal range of motion. Bilateral leg ulcers with some necrotic tissue right leg. Significantly improved erythema bilateral lower extremity. Superficial ulcers bilateral legs with no purulence  Lymphadenopathy:      Cervical: No cervical adenopathy. Skin:     General: Skin is warm and dry.    Neurological:      Mental Status: He is alert.            Labs: Results:   Chemistry Recent Labs     21  0252 21  0356 05/10/21  0240 * 103* 106*   * 130* 130*   K 3.9 4.0 4.6   CL 98* 99* 99*   CO2 29 22 25   BUN 26* 20* 21*   CREA 1.28 1.14 1.08   CA 8.9 8.0* 7.7*   AGAP 7 9 6   BUCR 20 18 19      CBC w/Diff Recent Labs     05/12/21  0252 05/11/21  0356 05/10/21  0240   WBC 9.3 12.6 12.0   RBC 3.78* 3.59* 3.60*   HGB 11.5* 10.9* 10.9*   HCT 35.1* 33.1* 33.6*    344 406   GRANS 69 78* 85*   LYMPH 12* 8* 5*   EOS 4 0 1      Microbiology No results for input(s): CULT in the last 72 hours. RADIOLOGY:    All available imaging studies/reports in [facility] for this admission were reviewed      Disclaimer: Sections of this note are dictated utilizing voice recognition software, which may have resulted in some phonetic based errors in grammar and contents. Even though attempts were made to correct all the mistakes, some may have been missed, and remained in the body of the document. If questions arise, please contact our department.     Dr. Hawa Parker, Infectious Disease Specialist  [phone]  May 12, 2021  9:27 AM

## 2021-05-12 NOTE — PROGRESS NOTES
Chart reviewed. Patient with bilateral lower extremity vasculitis with superimposed infection. Noninvasive vascular studies negative. Agree with rheumatology follow up on outpatient basis. No vascular intervention indicated at this time. Will sign off for now. Please call if any further questions or concerns arise.        Ruth Hall MD PhD  Vascular Surgery

## 2021-05-12 NOTE — PROGRESS NOTES
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 - respiratory - stable  - hemodynamics - stable, no pressrs  - UOP-ok  - Nutrition -poor    Objective:     Visit Vitals  BP (!) 166/85 (BP 1 Location: Left lower arm, BP Patient Position: At rest)   Pulse 82   Temp 97.7 °F (36.5 °C)   Resp 20   Ht 5' 11\" (1.803 m)   Wt 122.9 kg (271 lb)   SpO2 97%   BMI 37.80 kg/m²         Intake/Output Summary (Last 24 hours) at 5/12/2021 1643  Last data filed at 5/12/2021 1249  Gross per 24 hour   Intake 510 ml   Output 2500 ml   Net -1990 ml       Physical Exam:     Patient is in no apparent distress. HEENT: Head is normocephalic and atraumatic. Neck: no cervical lymphadenopathy or thyromegaly. Lungs: good air entry, clear to auscultation bilaterally. Cardiovascular system: S1, S2, regular rate and rhythm. Abdomen: soft, non tender, non distended.    Extremities:meche lower ext erythema + ,meche bandages    Data Review:    Recent Labs     05/12/21  0252   WBC 9.3   RBC 3.78*   HCT 35.1*   MCV 92.9   MCH 30.4   MCHC 32.8   RDW 13.3     Recent Labs     05/12/21  0252 05/11/21  0356 05/10/21  0240   BUN 26* 20* 21*   CREA 1.28 1.14 1.08   CA 8.9 8.0* 7.7*   K 3.9 4.0 4.6   * 130* 130*   CL 98* 99* 99*   CO2 29 22 25   PHOS  --   --  3.5   * 103* 106*       Hema Ward MD

## 2021-05-12 NOTE — ROUTINE PROCESS
Bedside shift change report given to Courtney Paiz (oncoming nurse) by  Kasey Gonzalez RN (offgoing nurse). Report included the following information SBAR, Kardex, ED Summary and Recent Results.

## 2021-05-12 NOTE — PROGRESS NOTES
Problem: Falls - Risk of  Goal: *Absence of Falls  Description: Document Barb Jo Fall Risk and appropriate interventions in the flowsheet.   Outcome: Progressing Towards Goal  Note: Fall Risk Interventions:  Mobility Interventions: Bed/chair exit alarm         Medication Interventions: Bed/chair exit alarm         History of Falls Interventions: Bed/chair exit alarm         Problem: Patient Education: Go to Patient Education Activity  Goal: Patient/Family Education  Outcome: Progressing Towards Goal

## 2021-05-12 NOTE — PROGRESS NOTES
Dr. Pallavi Michael came to see CM, patient needs Rheumatologist Dr. Janay Butcher, if available, 7-10 days or earlier, if not any accepting Rheumatologist.     CM asked Marily Lopez Specialist to please assist with appointment for patient.          Jared Morgan RN  Case Management [phone]

## 2021-05-13 VITALS
DIASTOLIC BLOOD PRESSURE: 72 MMHG | RESPIRATION RATE: 20 BRPM | OXYGEN SATURATION: 96 % | BODY MASS INDEX: 37.94 KG/M2 | HEIGHT: 71 IN | HEART RATE: 82 BPM | SYSTOLIC BLOOD PRESSURE: 158 MMHG | TEMPERATURE: 98.6 F | WEIGHT: 271 LBS

## 2021-05-13 LAB
ANA TITR SER IF: NEGATIVE {TITER}
ENA SM AB SER-ACNC: <0.2 AI (ref 0–0.9)

## 2021-05-13 PROCEDURE — 97116 GAIT TRAINING THERAPY: CPT

## 2021-05-13 PROCEDURE — 74011250636 HC RX REV CODE- 250/636: Performed by: FAMILY MEDICINE

## 2021-05-13 PROCEDURE — 74011250637 HC RX REV CODE- 250/637: Performed by: FAMILY MEDICINE

## 2021-05-13 PROCEDURE — 97535 SELF CARE MNGMENT TRAINING: CPT

## 2021-05-13 PROCEDURE — 99239 HOSP IP/OBS DSCHRG MGMT >30: CPT | Performed by: HOSPITALIST

## 2021-05-13 PROCEDURE — 77030041076 HC DRSG AG OPTICELL MDII -A

## 2021-05-13 PROCEDURE — 74011250637 HC RX REV CODE- 250/637: Performed by: INTERNAL MEDICINE

## 2021-05-13 PROCEDURE — 74011250637 HC RX REV CODE- 250/637: Performed by: PHYSICIAN ASSISTANT

## 2021-05-13 PROCEDURE — 77030027138 HC INCENT SPIROMETER -A

## 2021-05-13 PROCEDURE — 77010033678 HC OXYGEN DAILY

## 2021-05-13 PROCEDURE — 74011250637 HC RX REV CODE- 250/637: Performed by: HOSPITALIST

## 2021-05-13 PROCEDURE — 74011636637 HC RX REV CODE- 636/637: Performed by: HOSPITALIST

## 2021-05-13 PROCEDURE — 2709999900 HC NON-CHARGEABLE SUPPLY

## 2021-05-13 PROCEDURE — 97530 THERAPEUTIC ACTIVITIES: CPT

## 2021-05-13 RX ORDER — HYDROCODONE BITARTRATE AND ACETAMINOPHEN 5; 325 MG/1; MG/1
2 TABLET ORAL
Qty: 20 TAB | Refills: 0 | Status: SHIPPED | OUTPATIENT
Start: 2021-05-13 | End: 2021-05-13 | Stop reason: SDUPTHER

## 2021-05-13 RX ORDER — ASPIRIN 81 MG/1
81 TABLET ORAL DAILY
Qty: 30 TAB | Refills: 0 | Status: SHIPPED | OUTPATIENT
Start: 2021-05-13 | End: 2021-05-13 | Stop reason: SDUPTHER

## 2021-05-13 RX ORDER — AMOXICILLIN AND CLAVULANATE POTASSIUM 875; 125 MG/1; MG/1
1 TABLET, FILM COATED ORAL EVERY 12 HOURS
Qty: 14 TAB | Refills: 0 | Status: SHIPPED | OUTPATIENT
Start: 2021-05-13 | End: 2021-05-20

## 2021-05-13 RX ORDER — CIPROFLOXACIN 750 MG/1
750 TABLET, FILM COATED ORAL EVERY 12 HOURS
Qty: 14 TAB | Refills: 0 | Status: SHIPPED | OUTPATIENT
Start: 2021-05-13 | End: 2021-05-13 | Stop reason: SDUPTHER

## 2021-05-13 RX ORDER — CYCLOPHOSPHAMIDE 25 MG/1
25 CAPSULE ORAL EVERY 12 HOURS
Qty: 60 CAP | Refills: 0 | Status: SHIPPED | OUTPATIENT
Start: 2021-05-13 | End: 2021-05-13 | Stop reason: SDUPTHER

## 2021-05-13 RX ORDER — HYDRALAZINE HYDROCHLORIDE 50 MG/1
50 TABLET, FILM COATED ORAL EVERY 8 HOURS
Qty: 90 TAB | Refills: 0 | Status: SHIPPED | OUTPATIENT
Start: 2021-05-13 | End: 2022-01-27 | Stop reason: SDUPTHER

## 2021-05-13 RX ORDER — HYDRALAZINE HYDROCHLORIDE 50 MG/1
50 TABLET, FILM COATED ORAL EVERY 8 HOURS
Qty: 90 TAB | Refills: 0 | Status: SHIPPED | OUTPATIENT
Start: 2021-05-13 | End: 2021-05-13 | Stop reason: SDUPTHER

## 2021-05-13 RX ORDER — AMLODIPINE BESYLATE 10 MG/1
10 TABLET ORAL DAILY
Qty: 30 TAB | Refills: 0 | Status: SHIPPED | OUTPATIENT
Start: 2021-05-13 | End: 2021-05-21 | Stop reason: SDUPTHER

## 2021-05-13 RX ORDER — ASPIRIN 81 MG/1
81 TABLET ORAL DAILY
Qty: 30 TAB | Refills: 0 | Status: SHIPPED | OUTPATIENT
Start: 2021-05-13

## 2021-05-13 RX ORDER — METOPROLOL TARTRATE 100 MG/1
100 TABLET ORAL EVERY 12 HOURS
Qty: 60 TAB | Refills: 0 | Status: SHIPPED | OUTPATIENT
Start: 2021-05-13 | End: 2021-05-21 | Stop reason: SDUPTHER

## 2021-05-13 RX ORDER — METOPROLOL TARTRATE 100 MG/1
100 TABLET ORAL EVERY 12 HOURS
Qty: 60 TAB | Refills: 0 | Status: SHIPPED | OUTPATIENT
Start: 2021-05-13 | End: 2021-05-13 | Stop reason: SDUPTHER

## 2021-05-13 RX ORDER — PREDNISONE 5 MG/1
15 TABLET ORAL DAILY
Qty: 90 TAB | Refills: 0 | Status: SHIPPED | OUTPATIENT
Start: 2021-05-13 | End: 2021-06-12

## 2021-05-13 RX ORDER — HYDROCODONE BITARTRATE AND ACETAMINOPHEN 5; 325 MG/1; MG/1
2 TABLET ORAL
Qty: 20 TAB | Refills: 0 | Status: SHIPPED | OUTPATIENT
Start: 2021-05-13 | End: 2021-05-16

## 2021-05-13 RX ORDER — THERA TABS 400 MCG
1 TAB ORAL DAILY
Qty: 30 TAB | Refills: 0 | Status: SHIPPED | OUTPATIENT
Start: 2021-05-13 | End: 2022-01-27 | Stop reason: ALTCHOICE

## 2021-05-13 RX ORDER — CIPROFLOXACIN 750 MG/1
750 TABLET, FILM COATED ORAL EVERY 12 HOURS
Qty: 14 TAB | Refills: 0 | Status: SHIPPED | OUTPATIENT
Start: 2021-05-13 | End: 2021-05-20

## 2021-05-13 RX ORDER — CYCLOPHOSPHAMIDE 25 MG/1
25 CAPSULE ORAL EVERY 12 HOURS
Qty: 60 CAP | Refills: 0 | Status: SHIPPED | OUTPATIENT
Start: 2021-05-13 | End: 2022-01-27 | Stop reason: ALTCHOICE

## 2021-05-13 RX ORDER — AMLODIPINE BESYLATE 10 MG/1
10 TABLET ORAL DAILY
Qty: 30 TAB | Refills: 0 | Status: SHIPPED | OUTPATIENT
Start: 2021-05-13 | End: 2021-05-13 | Stop reason: SDUPTHER

## 2021-05-13 RX ORDER — FAMOTIDINE 40 MG/1
40 TABLET, FILM COATED ORAL DAILY
Qty: 30 TAB | Refills: 0 | Status: SHIPPED | OUTPATIENT
Start: 2021-05-13 | End: 2022-01-27 | Stop reason: ALTCHOICE

## 2021-05-13 RX ORDER — HYDRALAZINE HYDROCHLORIDE 50 MG/1
50 TABLET, FILM COATED ORAL EVERY 8 HOURS
Status: DISCONTINUED | OUTPATIENT
Start: 2021-05-13 | End: 2021-05-13 | Stop reason: HOSPADM

## 2021-05-13 RX ORDER — PREDNISONE 20 MG/1
40 TABLET ORAL
Status: COMPLETED | OUTPATIENT
Start: 2021-05-13 | End: 2021-05-13

## 2021-05-13 RX ORDER — HYDRALAZINE HYDROCHLORIDE 25 MG/1
25 TABLET, FILM COATED ORAL EVERY 8 HOURS
Qty: 90 TAB | Refills: 0 | Status: SHIPPED | OUTPATIENT
Start: 2021-05-13 | End: 2021-05-13

## 2021-05-13 RX ORDER — PREDNISONE 5 MG/1
15 TABLET ORAL DAILY
Qty: 90 TAB | Refills: 0 | Status: SHIPPED | OUTPATIENT
Start: 2021-05-13 | End: 2021-05-13 | Stop reason: SDUPTHER

## 2021-05-13 RX ORDER — CYCLOPHOSPHAMIDE 25 MG/1
25 CAPSULE ORAL EVERY 12 HOURS
Status: DISCONTINUED | OUTPATIENT
Start: 2021-05-13 | End: 2021-05-13 | Stop reason: HOSPADM

## 2021-05-13 RX ORDER — THERA TABS 400 MCG
1 TAB ORAL DAILY
Qty: 30 TAB | Refills: 0 | Status: SHIPPED | OUTPATIENT
Start: 2021-05-13 | End: 2021-05-13 | Stop reason: SDUPTHER

## 2021-05-13 RX ORDER — AMOXICILLIN AND CLAVULANATE POTASSIUM 875; 125 MG/1; MG/1
1 TABLET, FILM COATED ORAL EVERY 12 HOURS
Qty: 14 TAB | Refills: 0 | Status: SHIPPED | OUTPATIENT
Start: 2021-05-13 | End: 2021-05-13 | Stop reason: SDUPTHER

## 2021-05-13 RX ADMIN — Medication 10 ML: at 17:11

## 2021-05-13 RX ADMIN — Medication 100 MG: at 10:37

## 2021-05-13 RX ADMIN — HYDRALAZINE HYDROCHLORIDE 10 MG: 20 INJECTION, SOLUTION INTRAMUSCULAR; INTRAVENOUS at 12:44

## 2021-05-13 RX ADMIN — AMOXICILLIN AND CLAVULANATE POTASSIUM 1 TABLET: 875; 125 TABLET, FILM COATED ORAL at 10:47

## 2021-05-13 RX ADMIN — HYDRALAZINE HYDROCHLORIDE 25 MG: 25 TABLET, FILM COATED ORAL at 06:40

## 2021-05-13 RX ADMIN — Medication 10 ML: at 05:49

## 2021-05-13 RX ADMIN — ENOXAPARIN SODIUM 40 MG: 40 INJECTION SUBCUTANEOUS at 10:38

## 2021-05-13 RX ADMIN — ASPIRIN 81 MG: 81 TABLET, COATED ORAL at 10:37

## 2021-05-13 RX ADMIN — AMLODIPINE BESYLATE 10 MG: 10 TABLET ORAL at 10:37

## 2021-05-13 RX ADMIN — HYDROCODONE BITARTRATE AND ACETAMINOPHEN 2 TABLET: 5; 325 TABLET ORAL at 04:02

## 2021-05-13 RX ADMIN — PREDNISONE 40 MG: 20 TABLET ORAL at 17:14

## 2021-05-13 RX ADMIN — CIPROFLOXACIN 750 MG: 500 TABLET, FILM COATED ORAL at 10:37

## 2021-05-13 RX ADMIN — METOPROLOL TARTRATE 100 MG: 50 TABLET, FILM COATED ORAL at 10:37

## 2021-05-13 RX ADMIN — HYDROCODONE BITARTRATE AND ACETAMINOPHEN 2 TABLET: 5; 325 TABLET ORAL at 17:14

## 2021-05-13 RX ADMIN — HYDRALAZINE HYDROCHLORIDE 50 MG: 50 TABLET, FILM COATED ORAL at 17:14

## 2021-05-13 RX ADMIN — HYDROCODONE BITARTRATE AND ACETAMINOPHEN 2 TABLET: 5; 325 TABLET ORAL at 10:47

## 2021-05-13 RX ADMIN — FOLIC ACID 1 MG: 1 TABLET ORAL at 10:37

## 2021-05-13 RX ADMIN — THERA TABS 1 TABLET: TAB at 09:00

## 2021-05-13 NOTE — PROGRESS NOTES
CM went to see patient, Rolling Walker and Bedside Commode given to patient from [employer], forms signed, copies given to patient, and faxed to [facility] Ronna Petit Specialist to [phone].         Mac Masters RN  Case Management [phone]

## 2021-05-13 NOTE — PROGRESS NOTES
Problem: Self Care Deficits Care Plan (Adult)  Goal: *Acute Goals and Plan of Care (Insert Text)  Description: Occupational Therapy Goals  Initiated 5/8/2021 within 7 day(s). 1.  Patient will perform LB dressing with modified independence  2. Patient will maneuver on and off BSC with modified independence (once medically cleared for standing)  3. Patient will perform standing during LB clothes management with modified independence (once medically cleared for standing)  Outcome: Progressing Towards Goal     OCCUPATIONAL THERAPY TREATMENT    Patient: Lahoma Merlin (42 y.o. male)  Date: 5/13/2021  Diagnosis: Sepsis ([facility] [address] [A41.9] <principal problem not specified>  Procedure(s) (LRB):  SKIN BIOPSY OF RIGHT LOWER EXTREMITY (Right) 6 Days Post-Op  Precautions: Fall, Aspiration    Chart, occupational therapy assessment, plan of care, and goals were reviewed. ASSESSMENT:  Pt cleared by RN to participate in OT session. PT/OT co-treat to maximize pt safety and functional mobility for self-care skills. Pt received semi-reclined in bed and agreeable to OT session with encouragement. Pt able to complete bed mobility with supv with HOB elevated and use of SR. While sitting EOB, pt c/o increased LE pain. Pt able to stand with min A from bed with RW and walk short distance to recliner, reporting inc in pain and declining further functional transfer practice. While sitting in chair, pt performed grooming tasks with set-up A followed by mod I. Pt declined other washing up at this time. Educated pt on importance of maintain UE ROM and provided edu on activities to keep ROM while in bed. Pt left seated up in chair and nursing notified. All needs met. Pt stated he is going to live at his parents house with 4 MILO with B handrails, 1 story and his wife will be there to assist him. Pt stated he has an equipped handicap bathroom. Unsure of distance pt will have to travel to bathroom through.  Pt would benefit from [facility] for ease at home. Will recommend [facility] with 24/7 assist.    Progression toward goals:  []          Improving appropriately and progressing toward goals  [x]          Improving slowly and progressing toward goals  []          Not making progress toward goals and plan of care will be adjusted     PLAN:  Patient continues to benefit from skilled intervention to address the above impairments. Continue treatment per established plan of care. Discharge Recommendations:  Home Health with 24/7 assistance  Further Equipment Recommendations for Discharge:  bedside commode; pt stated he has shower chair/accessible bathroom     SUBJECTIVE:   Patient stated  My legs are hurting so bad. I need some more pain medication.     OBJECTIVE DATA SUMMARY:   Cognitive/Behavioral Status:  Neurologic State: Alert  Orientation Level: Oriented X4  Cognition: Follows commands     Functional Mobility and Transfers for ADLs:   Bed Mobility:     Supine to Sit: Supervision  Sit to Supine: Supervision  Scooting: Supervision   Transfers:  Sit to Stand: Minimum assistance  Stand to Sit: Contact guard assistance     Balance:  Sitting: Intact  Standing: Impaired; With support  Standing - Static: Good  Standing - Dynamic : Fair    ADL Intervention:    Grooming  Grooming Assistance: Set-up; Modified independent  Position Performed: Seated in chair  Washing Face: Set-up; Modified independent  Brushing Teeth: Set-up; Modified independent    D/t pt pain level, deferred LB dressing interventions. UE Therapeutic Exercises:   Provided education regarding maintenance of UE ROM. Educated pt on performing shoulder shrugs,     Pain:  Pain level pre-treatment: 8/10   Pain level post-treatment: 8/10  Pain Intervention(s): Medication (see MAR); Rest, Ice, Repositioning   Response to intervention: Nurse notified, See doc flow    Activity Tolerance:    Fair -; pt c/o pain   Please refer to the flowsheet for vital signs taken during this treatment.   After treatment:   [x]  Patient left in no apparent distress sitting up in chair  []  Patient left in no apparent distress in bed  [x]  Call bell left within reach  [x]  Nursing notified  []  Caregiver present  []  Bed alarm activated    COMMUNICATION/EDUCATION:   [x] Role of Occupational Therapy in the acute care setting  [x] Home safety education was provided and the patient/caregiver indicated understanding. [] Patient/family have participated as able in working towards goals and plan of care. [] Patient/family agree to work toward stated goals and plan of care. [] Patient understands intent and goals of therapy, but is neutral about his/her participation. [] Patient is unable to participate in goal setting and plan of care.       Thank you for this referral.  LAMONT Hall, OTR/L  Time Calculation: 23 mins

## 2021-05-13 NOTE — PROGRESS NOTES
Patient's wife Aisha Roth [phone] called CM back, she has not received a call from Dr. Millie Diane's office Rheumatologist.   CM discussed wound care with patient's wife, she said they have no income at all. Patient's wife is agreeable to learning wound care for patient, and will come to the hospital at time of discharge to learn wound care. Dr. Jaquelin Duke updated, and aware that patient will not be receiving home health, and not receiving PT/OT due to no income, and no insurance coverage.       Maggi Blake, RN  Case Management [phone]

## 2021-05-13 NOTE — PROGRESS NOTES
Problem: Mobility Impaired (Adult and Pediatric)  Goal: *Acute Goals and Plan of Care (Insert Text)  Description: Physical Therapy Goals  Initiated 5/9/2021 and to be accomplished within 7 day(s)  1. Patient will move from supine to sit and sit to supine in bed with modified independence. 2.  Patient will transfer from bed to chair and chair to bed with modified independence using the least restrictive device. 3.  Patient will perform sit to stand with modified independence. 4.  Patient will ambulate with modified independence for 50 feet with the least restrictive device. PLOF: Independent      Outcome: Progressing Towards Goal    PHYSICAL THERAPY TREATMENT    Patient: Coy Gustafson (63 y.o. male)  Date: 5/13/2021  [facility] [A41.9] <principal problem not specified>  Procedure(s) (LRB):  SKIN BIOPSY OF RIGHT LOWER EXTREMITY (Right) 6 Days Post-Op  Precautions: Fall, Aspiration  PLOF: see above     ASSESSMENT:  Pt cleared to participate in PT session, pt received semi-reclined in bed and agreeable to therapy session with encouragement. Completing with OT to maximize safety and mobility. Pt completing supine to sit with supervision, HOB elevated and use of bedrail to L side of bed. Pt sitting EOB with good sitting balance, reports of increasing LE pain due to dependent position of LEs in short sitting. Pt standing with Brooke from bed to RW. Pt then ambulating x3 feet to recliner, reporting significant pain, declining to ambulate in room and becoming tearful. Sitting with poor eccentric control, LEs immediately elevated in recliner with some relief of pain. Pt educated on LE exercises to complete throughout the day, written on whiteboard for improved compliance. Pt performing oral care in recliner, bed linens changed due to weeping of LEs. Pt positioned for comfort and educated to call for assist before getting up, pt verbalized understanding.  Pt left with all needs met and call bell in reach. RN notified of position and participation. Pt reporting he is going to his parents house with 4 MILO with B handrails, 1 story and wife will be there to assist him. Pt has not been able to ambulate with therapy >3 feet and has not completed stair training. Unsure if he will be able to get into his house upon discharge due to pain and limited mobility. Pt would benefit from  and [facility] for ease at home, may need medical transport or ramp/wheelchair to enter home. Will recommend [facility] with 24/7 assist.     Progression toward goals:   []      Improving appropriately and progressing toward goals  [x]      Improving slowly and progressing toward goals  []      Not making progress toward goals and plan of care will be adjusted     PLAN:  Patient continues to benefit from skilled intervention to address the above impairments. Continue treatment per established plan of care. Discharge Recommendations:  Home Health with 24/7 assist if unable to provide, [facility]  Further Equipment Recommendations for Discharge:  rolling walker and BSC     SUBJECTIVE:   Patient stated My legs just hurt so bad, I need better pain medications.     OBJECTIVE DATA SUMMARY:   Critical Behavior:  Neurologic State: Alert  Orientation Level: Oriented X4  Cognition: Follows commands     Functional Mobility Training:  Bed Mobility:     Supine to Sit: Supervision  Sit to Supine: Supervision  Scooting: Supervision    Transfers:  Sit to Stand: Minimum assistance  Stand to Sit: Contact guard assistance    Balance:  Sitting: Intact  Standing: Impaired; With support  Standing - Static: Good  Standing - Dynamic : Fair      Posture:   Posture (WDL): Exceptions to WDL   Posture Assessment: Trunk flexion(hip hinge )    Ambulation/Gait Training:  Distance (ft): 3 Feet (ft)  Assistive Device: Walker, rolling  Ambulation - Level of Assistance: Contact guard assistance     Gait Description (WDL): Exceptions to WDL  Gait Abnormalities: Decreased step clearance    Base of Support: Center of gravity altered; Widened     Speed/Francia: Slow;Shuffled  Step Length: Right shortened;Left shortened    Therapeutic Exercises:   Completed the following      EXERCISE   Sets   Reps   Active Active Assist   Passive Self ROM   Comments   Ankle Pumps 1 10  [x] [] [] []    Quad Sets/Glut Sets    [] [] [] [] Hold for 5 secs   Hamstring Sets   [] [] [] []    Short Arc Quads   [] [] [] []    Heel Slides   [] [] [] []    Straight Leg Raises 1 10 [x] [] [] []    Hip Add   [] [] [] [] Hold for 5 secs, w/ pillow squeeze   Long Arc Quads 1 10 [x] [] [] []    Seated Marching   [] [] [] []    Standing Marching   [] [] [] []       [] [] [] []        Pain:  Pain level pre-treatment: 10/10  Pain level post-treatment: 10/10   Pain Intervention(s): Rest,  Repositioning  Response to intervention: Nurse notified    Activity Tolerance:   Fair activity tolerance, limited due to pain. Please refer to the flowsheet for vital signs taken during this treatment. After treatment:   [x] Patient left in no apparent distress sitting up in chair  [] Patient left in no apparent distress in bed  [x] Call bell left within reach  [x] Nursing notified  [] Caregiver present  [] Bed alarm activated  [] SCDs applied      COMMUNICATION/EDUCATION:   [x]         Role of Physical Therapy in the acute care setting. [x]         Fall prevention education was provided and the patient/caregiver indicated understanding. [x]         Patient/family have participated as able in working toward goals and plan of care. [x]         Patient/family agree to work toward stated goals and plan of care. []         Patient understands intent and goals of therapy, but is neutral about his/her participation.   []         Patient is unable to participate in stated goals/plan of care: ongoing with therapy staff.  []         Other:        Farnaz Vazquez, PT   Time Calculation: 25 mins

## 2021-05-13 NOTE — DISCHARGE SUMMARY
Discharge Summary    Patient: Coy Gustafson MRN: [id number]  CSN: [id number]    YOB: 1960  Age: 62 y.o. Sex: male    DOA: 5/5/2021 LOS:  LOS: 8 days        Disposition: Home with Kei Ryan    Discharge Date: 5/13/2021    Admission Diagnosis: Sepsis ([address] [A41.9]    Discharge Diagnosis:    1. Bilateral lower extremity cellulitis with polymicrobial infection, status post biopsy  2. Neutrophilic vasculitis, confirmed with biopsy. 3. A Flutter RVR, converted to sinus rhythm now. 4. SEPSIS with tachy and leukocytosis due to # 2, POA  5. Hypertension, BP elevated  6. Hyponatremia improved  7. Arthritis,? Rheumatoid per patient  8. Alcoholism  9. Tobacco abuse    Discharge Condition: Stable      PHYSICAL EXAM  Visit Vitals  BP (!) 177/95   Pulse 96   Temp 98.3 °F (36.8 °C)   Resp 18   Ht 5' 11\" (1.803 m)   Wt 122.9 kg (271 lb)   SpO2 97%   BMI 37.80 kg/m²       General: Alert, cooperative, no acute distress    HEENT: PERRLA, EOMI. Anicteric sclerae. Lungs:  CTA Bilaterally. No Wheezing/Rales. Heart:             Regular rate and Rhythm. Abdomen: Soft, Non distended, Non tender. + Bowel sounds. Extremities: No edema. Redness erythema improved, swelling improved. Bilateral leg dressing clean. Psych:   Good insight. Not anxious or agitated. Neurologic:  AA, oriented X 3. Moves all ext                                 Hospital Course:   Christiano Gibbons is a 62 y.o.  male who presented to [facility] emergency department for evaluation of fever and lower extremity redness. Patient in the emergency room was noted to have bilateral lower extremity cellulitis. Patient was started on IV antibiotics and was admitted to the hospital.  Patient underwent lower extremity duplex, negative for DVT. Patient also had a mild history of alcohol abuse, patient was started on CIWA protocol. ID was consulted. ID saw the patient and recommended to continue empiric antibiotics.   Given bilateral cellulitis there was a concerned about vasculitis. ID recommended skin biopsy. General surgery was consulted and patient underwent skin biopsy. Patient was continued on empiric antibiotics. Vascular surgery was also consulted on the patient. Vascular surgery saw the patient, since patient has good blood flow recommended no intervention from the standpoint. Patient had uncontrolled hypertension in the hospital was started on hypertensive medications. Patient blood pressure slightly elevated due to his pain. Patient has significant amount of pain with the dressing change and movement. Patient's biopsy result came back vasculitis. Vasculitis work-up has been sent. Rheumatology was consulted, Dr. Chet Oakley saw the patient today recommended to start patient on p.o. steroids and Cytoxan. Rheumatology cleared the patient for discharge and follow-up with them in the office next week. Discussed with ID, ID recommend to change antibiotics to p.o. ID cleared the patient for discharge. Nephrology was consulted due to hyponatremia, patient's hyponatremia improved with fluid restriction. Nephrology recommended continue fluid suctioned at home and okay for discharge from the standpoint. Patient in the hospital was seen and evaluated by PT/OT recommend home health care versus SNF. Patient wants to go home with home health care. Other than slightly elevated blood pressure due to pain, patient hemodynamically medically stable for discharge home. Discussed with the patient and also with his wife at bedside about his discharge plan, follow-up appointments, new medications and side effects of the medication. Discussed with him about importance of dressing changes and follow-up appointments. Both of them understood and agreed with the plan. Patient is not able to afford home health care since his insurance does not cover so his wife will be doing the dressings at home. Patient insurance does not have much coverage.   Patient not able to afford home health care, follow-ups. Of discussed with the case management will be working with the patient to make sure patient gets medications, follow-up appointments and also material for wound care. Procedures:   Skin biopsy by Dr. Rosanne Miranda    Consults:   Dr. Rebekah Hernandes, general surgery  Dr. Sher Gallego, infectious disease  Dr. Marlen Chawla, rheumatology    Imaging studies:       Current Discharge Medication List      START taking these medications    Details   amLODIPine (NORVASC) 10 mg tablet Take 1 Tab by mouth daily. Qty: 30 Tab, Refills: 0  Start date: 5/13/2021      amoxicillin-clavulanate (AUGMENTIN) 875-125 mg per tablet Take 1 Tab by mouth every twelve (12) hours for 7 days. Qty: 14 Tab, Refills: 0  Start date: 5/13/2021, End date: 5/20/2021      aspirin delayed-release 81 mg tablet Take 1 Tab by mouth daily. Qty: 30 Tab, Refills: 0  Start date: 5/13/2021      ciprofloxacin HCl (CIPRO) 750 mg tablet Take 1 Tab by mouth every twelve (12) hours for 7 days. Qty: 14 Tab, Refills: 0  Start date: 5/13/2021, End date: 5/20/2021      cyclophosphamide (CYTOXAN) 25 mg capsule Take 1 Cap by mouth every twelve (12) hours. Qty: 60 Cap, Refills: 0  Start date: 5/13/2021      hydrALAZINE (APRESOLINE) 50 mg tablet Take 1 Tab by mouth every eight (8) hours. Qty: 90 Tab, Refills: 0  Start date: 5/13/2021      HYDROcodone-acetaminophen (NORCO) 5-325 mg per tablet Take 2 Tabs by mouth every six (6) hours as needed for Pain for up to 3 days. Max Daily Amount: 8 Tabs. Qty: 20 Tab, Refills: 0  Start date: 5/13/2021, End date: 5/16/2021    Associated Diagnoses: Cellulitis of lower extremity, unspecified laterality      metoprolol tartrate (LOPRESSOR) 100 mg IR tablet Take 1 Tab by mouth every twelve (12) hours. Qty: 60 Tab, Refills: 0  Start date: 5/13/2021      predniSONE (DELTASONE) 5 mg tablet Take 3 Tabs by mouth daily for 30 days.   Qty: 90 Tab, Refills: 0  Start date: 5/13/2021, End date: 6/12/2021      therapeutic multivitamin (THERAGRAN) tablet Take 1 Tab by mouth daily. Qty: 30 Tab, Refills: 0  Start date: 5/13/2021      famotidine (Pepcid) 40 mg tablet Take 1 Tab by mouth daily. Qty: 30 Tab, Refills: 0  Start date: 5/13/2021         STOP taking these medications       ibuprofen (MOTRIN IB) 200 mg tablet Comments:   Reason for Stopping:         meloxicam (MOBIC) 15 mg tablet Comments:   Reason for Stopping:         naproxen (NAPROSYN) 375 mg tablet Comments:   Reason for Stopping:         traMADol (ULTRAM) 50 mg tablet Comments:   Reason for Stopping:             · It is important that you take the medication exactly as they are prescribed. · Keep your medication in the bottles provided by the pharmacist and keep a list of the medication names, dosages, and times to be taken in your wallet. · Do not take other medications without consulting your doctor. DIET:  Cardiac Diet, fluid restriction 2000 ml per day     ACTIVITY: Activity as tolerated  Home health care for Skilled care for wound care, Hypertension and medication management    ·    PT/OT consult      ADDITIONAL INFORMATION: If you experience any of the following symptoms but not limited to Fever, chills, nausea, vomiting, diarrhea, change in mentation, falling, bleeding, shortness of breath, chest pain, please call your primary care physician or return to the emergency room if you cannot get hold of your doctor:     FOLLOW UP CARE:  Eulalio Blake NP 5-7 days. Please call and set up an appointment. Dr. Jaylene Runner, Rheumatology in 1 week  [address]  Dermatology in 2 weeks       Minutes spent on discharge: 40 minutes spent coordinating this discharge (review instructions/follow-up, prescriptions, preparing report for sign off)    Adrian Venegas MD  5/13/2021 9:55 AM    Disclaimer: Sections of this note are dictated using utilizing voice recognition software. Minor typographical errors may be present.  If questions arise, please do not hesitate to contact me or call our department.

## 2021-05-13 NOTE — INTERDISCIPLINARY ROUNDS
Interdisciplinary team rounds were held on May 13, 2021 with the following team membersCare Management, Nursing, Occupational Therapy and Physician  Plan of care discussed. See clinical pathway and/or care plan for interventions and desired outcomes.     Estimated date of discharge May 13, 2021    Caridad Wiggins MSN, RN, [address] [facility]  [phone]

## 2021-05-13 NOTE — PROGRESS NOTES
Infectious Disease  progress Note        Reason: Bilateral leg cellulitis    Current abx Prior abx     Zosyn since 5/10 Ceftriaxone 5/5-5/6  Cefepime 5/7-5/10   vancomycin 5/5/2021-5/10     Lines:       Assessment :    52-year-old man with past medical history significant for obesity, upper extremity cellulitis 4 years ago, arthritis admitted to [facility] on 5/5/2021 with increasing erythema/pain bilateral legs for several weeks. status post steroid injection in both knees on 3/18/21  H/o tick bite on abdomen around 4/18/21    Now with 5 week h/o increasing bilateral leg swelling, 3 week h/o increasing redness/pain bilateral legs with blackish discoloration of legs, cyanotic appearance of feet    Patient presents with a highly complex clinical picture. Clinical presentation c/w acute gangrenous cellulitis of both legs- infection superimposed on underlying vasculitis. Wound culture 5/7- pseudomonas putida, enterococcus, group B streptococcus, Staphylococcus intermedius-susceptibilities reviewed    Status post skin biopsy of lower extremity ulcer on 5/7/2021- afb stain negative. Fungal cultures, histology suggestive of neutrophilic vasculitis. Negative fungal/AFB stain, cultures 5/7/2021    Decreased erythema and warmth noted on today's exam.  Most of the necrotic tissue has now sloughed off. Wounds seem to be healing    Recommendations:    1.  continue po ciprofloxacin, augmentin till 5/20/21  2. Will need rheumatology evaluation for vasculitis. 3.  Continue local wound care     Above plan was discussed in details with patient, RN and dr Navjot Garvey. Please call me if any further questions or concerns. Will continue to participate in the care of this patient. HPI:    Feels better. Improved lower extremity pain/itching        home Medication List    Details   ibuprofen (MOTRIN IB) 200 mg tablet Take  by mouth.      meloxicam (MOBIC) 15 mg tablet Take 1 Tab by mouth daily (with breakfast).   Qty: 30 Tab, Refills: 1    Associated Diagnoses: Cellulitis of right hand      naproxen (NAPROSYN) 375 mg tablet Take 1 Tab by mouth two (2) times daily (with meals). Qty: 20 Tab, Refills: 0      traMADol (ULTRAM) 50 mg tablet Take 1 Tab by mouth every six (6) hours as needed for Pain. Max Daily Amount: 200 mg.   Qty: 12 Tab, Refills: 0    Associated Diagnoses: Arthritis of right hand             Current Facility-Administered Medications   Medication Dose Route Frequency    hydrALAZINE (APRESOLINE) tablet 25 mg  25 mg Oral Q8H    ciprofloxacin HCl (CIPRO) tablet 750 mg  750 mg Oral Q12H    amoxicillin-clavulanate (AUGMENTIN) 875-125 mg per tablet 1 Tab  1 Tab Oral Q12H    amLODIPine (NORVASC) tablet 10 mg  10 mg Oral DAILY    metoprolol tartrate (LOPRESSOR) tablet 100 mg  100 mg Oral Q12H    morphine injection 2 mg  2 mg IntraVENous Q4H PRN    HYDROcodone-acetaminophen (NORCO) 5-325 mg per tablet 2 Tab  2 Tab Oral Q6H PRN    aspirin delayed-release tablet 81 mg  81 mg Oral DAILY    sodium chloride (NS) flush 5-40 mL  5-40 mL IntraVENous Q8H    sodium chloride (NS) flush 5-40 mL  5-40 mL IntraVENous PRN    acetaminophen (TYLENOL) tablet 650 mg  650 mg Oral Q6H PRN    Or    acetaminophen (TYLENOL) suppository 650 mg  650 mg Rectal Q6H PRN    polyethylene glycol (MIRALAX) packet 17 g  17 g Oral DAILY PRN    promethazine (PHENERGAN) tablet 12.5 mg  12.5 mg Oral Q6H PRN    Or    ondansetron (ZOFRAN) injection 4 mg  4 mg IntraVENous Q6H PRN    enoxaparin (LOVENOX) injection 40 mg  40 mg SubCUTAneous DAILY    diphenhydrAMINE (BENADRYL) capsule 50 mg  50 mg Oral Q6H PRN    hydrALAZINE (APRESOLINE) 20 mg/mL injection 10 mg  10 mg IntraVENous Q6H PRN    sodium chloride (NS) flush 5-40 mL  5-40 mL IntraVENous Q8H    sodium chloride (NS) flush 5-40 mL  5-40 mL IntraVENous PRN    LORazepam (ATIVAN) tablet 1 mg  1 mg Oral Q1H PRN    Or    LORazepam (ATIVAN) injection 1 mg  1 mg IntraVENous Q1H PRN    LORazepam (ATIVAN) tablet 2 mg  2 mg Oral Q1H PRN    Or    LORazepam (ATIVAN) injection 2 mg  2 mg IntraVENous Q1H PRN    LORazepam (ATIVAN) injection 3 mg  3 mg IntraVENous Q15MIN PRN    thiamine HCL (B-1) tablet 100 mg  100 mg Oral DAILY    folic acid (FOLVITE) tablet 1 mg  1 mg Oral DAILY    therapeutic multivitamin (THERAGRAN) tablet 1 Tab  1 Tab Oral DAILY       Allergies: Patient has no known allergies. Temp (24hrs), Av.2 °F (36.8 °C), Min:97.6 °F (36.4 °C), Max:98.8 °F (37.1 °C)    Visit Vitals  BP (!) 177/95   Pulse 96   Temp 98.3 °F (36.8 °C)   Resp 18   Ht 5' 11\" (1.803 m)   Wt 122.9 kg (271 lb)   SpO2 97%   BMI 37.80 kg/m²       ROS: 12 point ROS obtained in details. Pertinent positives as mentioned in HPI,   otherwise negative    Physical Exam:    Vitals signs and nursing note reviewed. Constitutional:          Appearance: He is well-developed. obese. Laying on the bed, appears more comfortable than prior exam   HENT:      Head: Normocephalic and atraumatic. Eyes:      General: No scleral icterus. Conjunctiva/sclera: Conjunctivae normal.   Neck:      Musculoskeletal: Normal range of motion and neck supple. Vascular: No JVD. Cardiovascular:      Rate and Rhythm: Normal rate and regular rhythm. Heart sounds: Normal heart sounds. Pulmonary:      Effort: Pulmonary effort is normal.      Breath sounds: Normal breath sounds. Abdominal:      Palpations: Abdomen is soft. There is no mass. Tenderness: There is no abdominal tenderness. Musculoskeletal: Normal range of motion. Bilateral leg ulcers with some necrotic tissue right leg. Significantly improved erythema bilateral lower extremity. Superficial ulcers bilateral legs with no purulence  Lymphadenopathy:      Cervical: No cervical adenopathy. Skin:     General: Skin is warm and dry.    Neurological:      Mental Status: He is alert.            Labs: Results:   Chemistry Recent Labs     21  0252 21  0356   * 103*   NA 134* 130*   K 3.9 4.0   CL 98* 99*   CO2 29 22   BUN 26* 20*   CREA 1.28 1.14   CA 8.9 8.0*   AGAP 7 9   BUCR 20 18      CBC w/Diff Recent Labs     05/12/21  0252 05/11/21  0356   WBC 9.3 12.6   RBC 3.78* 3.59*   HGB 11.5* 10.9*   HCT 35.1* 33.1*    344   GRANS 69 78*   LYMPH 12* 8*   EOS 4 0      Microbiology No results for input(s): CULT in the last 72 hours. RADIOLOGY:    All available imaging studies/reports in [facility] for this admission were reviewed      Disclaimer: Sections of this note are dictated utilizing voice recognition software, which may have resulted in some phonetic based errors in grammar and contents. Even though attempts were made to correct all the mistakes, some may have been missed, and remained in the body of the document. If questions arise, please contact our department.     Dr. Elaine Roman, Infectious Disease Specialist  [phone]  May 13, 2021  9:27 AM

## 2021-05-13 NOTE — HOME CARE
Received referrral. Spoke with patient. He does not have a home care benefit, so I explained costs for private pay. Initial visit per disclipine $400, each additional visit per disclipline $200. States he cannot afford. CM made aware and discussed other options for wound care including wife being taught and outpatient wound clinic or MD office.      Macie Gallegos RN, BSN  [employer]

## 2021-05-13 NOTE — PROGRESS NOTES
Progress Note Date:2021       Room:[facility] Patient Name:Randal Colby     YOB: 1960     Age:60 y.o. Subjective Subjective Review of Systems Objective Vitals Last 24 Hours: TEMPERATURE:  Temp  Av.3 °F (36.8 °C)  Min: 97.7 °F (36.5 °C)  Max: 98.8 °F (37.1 °C) RESPIRATIONS RANGE: Resp  Av.7  Min: 18  Max: 20 
PULSE OXIMETRY RANGE: SpO2  Av.2 %  Min: 95 %  Max: 97 % PULSE RANGE: Pulse  Av.7  Min: 76  Max: 96 
BLOOD PRESSURE RANGE: Systolic (91MFN), EB , Min:161 , OO  
; Diastolic (64AEU), FWH:89, Min:79, Max:95 I/O (24Hr): Intake/Output Summary (Last 24 hours) at 2021 1250 Last data filed at 2021 1102 Gross per 24 hour Intake 750 ml Output 3200 ml Net -2450 ml Objective Labs/Imaging/Diagnostics Labs: CBC: 
Recent Labs 21 
0573 WBC 9.3 12.6 RBC 3.78* 3.59* HGB 11.5* 10.9* HCT 35.1* 33.1*  
MCV 92.9 92.2 RDW 13.3 13.3  344 CHEMISTRIES: 
Recent Labs 21 
1340 21 
5148 * 130*  
K 3.9 4.0  
CL 98* 99* CO2 29 22 BUN 26* 20* CA 8.9 8.0*  
MG  --  2.0 PT/INR:No results for input(s): INR, INREXT in the last 72 hours. No lab exists for component: PROTIME APTT:No results for input(s): APTT in the last 72 hours. LIVER PROFILE:No results for input(s): AST, ALT in the last 72 hours. No lab exists for component: BILIDIR, BILITOT, ALKPHOS Lab Results Component Value Date/Time ALT (SGPT) 31 2021 08:10 PM  
 AST (SGOT) 27 2021 08:10 PM  
 Alk. phosphatase 117 2021 08:10 PM  
 Bilirubin, total 0.6 2021 08:10 PM  
 
 
Imaging Last 24 Hours: 
No results found. Assessment//Plan Active Problems: 
  Sepsis ([facility] [address] (2021) Assessment: 
(Patient seen, examined and consult dictated. He has skin biopsy of Neutrophilic vasculitis of lower extremities.   
RECOMMEND: prednisone 15 mg po daily and cytoxan 25 mg po q 12 hrs. Office eval in one week. Yazmin Warren M.D. F.A.C.R..). Electronically signed by Maddie Summers MD on 5/13/2021 at 12:50 PM

## 2021-05-13 NOTE — PROGRESS NOTES
D/c instructions given to pt with verbal understanding of instructions. D/cd off floor via wheelchair.

## 2021-05-13 NOTE — PROGRESS NOTES
CM faxed Indigent Med Forms to [facility] [facility].           Eri Castañeda, RN  Case Management [phone]

## 2021-05-13 NOTE — PROGRESS NOTES
Problem: Falls - Risk of  Goal: *Absence of Falls  Description: Document [facility] Fall Risk and appropriate interventions in the flowsheet. Outcome: Progressing Towards Goal  Note: Fall Risk Interventions:  Mobility Interventions: Bed/chair exit alarm         Medication Interventions: Bed/chair exit alarm         History of Falls Interventions: Bed/chair exit alarm         Problem: Patient Education: Go to Patient Education Activity  Goal: Patient/Family Education  Outcome: Progressing Towards Goal     Problem: Pressure Injury - Risk of  Goal: *Prevention of pressure injury  Description: Document Xavier Scale and appropriate interventions in the flowsheet.   Outcome: Progressing Towards Goal  Note: Pressure Injury Interventions:  Sensory Interventions: Assess changes in LOC    Moisture Interventions: Maintain skin hydration (lotion/cream)    Activity Interventions: Increase time out of bed    Mobility Interventions: PT/OT evaluation    Nutrition Interventions: Document food/fluid/supplement intake    Friction and Shear Interventions: HOB 30 degrees or less

## 2021-05-13 NOTE — ROUTINE PROCESS
Bedside shift change report given to [facility] (oncoming nurse) by  Declan Peck (offgoing nurse). Report included the following information SBAR, Kardex and Intake/Output.

## 2021-05-13 NOTE — PROGRESS NOTES
CM updated Thompson Martin RN on discharge plan, patient's wife needs to be taught wound care to do at home, and wife will come to hospital at time of discharge to learn wound care.            Gucci Mckinnon RN  Case Management [phone]

## 2021-05-13 NOTE — PROGRESS NOTES
CM called and spoke to Nhi Frey with [facility], explained that patient has insurance, but it doesn't cover home health. Nhi Frey said she will find out how much out of pocket and call patient to see if they can pay, and call CM back.          Eldon Topete, RN  Case Management [phone]

## 2021-05-13 NOTE — PROGRESS NOTES
CM called and spoke to patient's wife Sg Mccoy [phone], and asked her if medications were covered under patient's insurance. CM called patient's insurance company [employer] [phone], and spoke to [facility]. Mon said patient's insurance only covers Wellness and Preventive Care, and outpatient office visits only. CM asked Mon if medications were covered. Mon replied the member would have to contact the Enrollment department for that information. CM called Kellen FRANCO CM, and updated her, that Outpatient therapy and [address] are not covered, and patient and wife do not have any income coming in.  CM cannot setup Outpatient therapy or [address] appointment with no insurance, and no income at this time. CHINYERE spoke with Dr. Marleny Quarles and updated him with above information. All appointments set up for patient by Ale Hubbard. Dr. Marleny Quarles sent prescriptions to [facility]. CM [facility] and spoke to [employer] [place], and she will run patient's insurance, at this time, it is showing not eligible. CM spoke to [facility] earlier and patient's coverage started 05/01/2021, and let [employer] [place] know. [employer] [place] said she will work on medications, and let  know if covered. [employer] [place] [facility] called back, medications are not covered. CM spoke to [facility] she will cover medications, but will not cover Norco, and OTC meds. CM emailed Aaliyah Sender with [employer] and asked her to screen patient for Medicaid. Dashawn Quarles and updated him with patient prescriptions.        Ewelina Crocker, RN  Case Management [phone]

## 2021-05-13 NOTE — PROGRESS NOTES
Bedside shift change report given to Stephanie Busch (oncoming nurse) by [address], RN (offgoing nurse). Report included the following information SBAR, Kardex, Intake/Output, MAR and Recent Results.

## 2021-05-13 NOTE — PROGRESS NOTES
D/C order noted for today. Orders reviewed. Danuta Rivas to teach patient's wife who is currently at the bedside, Wound Care and to give extra supplies, and document. Rolling Walker and Bedside Commode at the bedside by CM by [email or web address]. Patient's wife to transport patient home at time of discharge. No needs identified at this time. CM remains available if needed.        Leandra Garcia, [phone]

## 2021-05-13 NOTE — PROGRESS NOTES
CM called patient's wife Gilford Peck [phone], to see if Dr. Mack Murray office Rheumatologist had called her with an appt for patient. CM received voicemail, CM left phone number for a return call.          Starr Sigala RN  Case Management [phone]

## 2021-05-13 NOTE — PROGRESS NOTES
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 CKD after discharge     Please call with questions     Cartacho Sheriff MD [facility]  Cell [phone]  Pager: [phone]    Subjective:     - No acute over night events. - respiratory - stable  - hemodynamics - stable, no pressrs  - UOP-ok  - Nutrition -poor    Objective:     Visit Vitals  BP (!) 158/72 (BP 1 Location: Right lower arm, BP Patient Position: At rest)   Pulse 82   Temp 98.6 °F (37 °C)   Resp 20   Ht 5' 11\" (1.803 m)   Wt 122.9 kg (271 lb)   SpO2 96%   BMI 37.80 kg/m²         Intake/Output Summary (Last 24 hours) at 5/13/2021 1630  Last data filed at 5/13/2021 1306  Gross per 24 hour   Intake 1110 ml   Output 2600 ml   Net -1490 ml       Physical Exam:     Patient is in no apparent distress. HEENT: Head is normocephalic and atraumatic. Neck: no cervical lymphadenopathy or thyromegaly. Lungs: good air entry, clear to auscultation bilaterally. Cardiovascular system: S1, S2, regular rate and rhythm. Abdomen: soft, non tender, non distended.    Extremities:meche lower ext erythema + ,meche bandages    Data Review:    Recent Labs 05/12/21  0252   WBC 9.3   RBC 3.78*   HCT 35.1*   MCV 92.9   MCH 30.4   MCHC 32.8   RDW 13.3     Recent Labs     05/12/21 0252 05/11/21  0356   BUN 26* 20*   CREA 1.28 1.14   CA 8.9 8.0*   K 3.9 4.0   * 130*   CL 98* 99*   CO2 29 22   * 103*       Rosana Oakley MD

## 2021-05-13 NOTE — DISCHARGE INSTRUCTIONS
Discharge Instructions    Patient: Damon Crigler MRN: [id number]  CSN: [id number]    YOB: 1960  Age: 62 y.o. Sex: male    DOA: 5/5/2021 LOS:  LOS: 8 days   Discharge Date:      DIET:  Cardiac Diet, fluid restriction 2000 ml per day     ACTIVITY: Activity as tolerated  Home health care for Skilled care for wound care, Hypertension and medication management    ·    PT/OT consult      ADDITIONAL INFORMATION: If you experience any of the following symptoms but not limited to Fever, chills, nausea, vomiting, diarrhea, change in mentation, falling, bleeding, shortness of breath, chest pain, please call your primary care physician or return to the emergency room if you cannot get hold of your doctor:     FOLLOW UP CARE:  Ju Lopez NP 5-7 days. Please call and set up an appointment. Dr. Sohail Shelton, Rheumatology in 1 week  [facility] Dermatology in 2 weeks     Joni Toscano MD  5/13/2021 9:53 AM          DISCHARGE SUMMARY from Nurse    PATIENT INSTRUCTIONS:    After general anesthesia or intravenous sedation, for 24 hours or while taking prescription Narcotics:  · Limit your activities  · Do not drive and operate hazardous machinery  · Do not make important personal or business decisions  · Do  not drink alcoholic beverages  · If you have not urinated within 8 hours after discharge, please contact your surgeon on call.     Report the following to your surgeon:  · Excessive pain, swelling, redness or odor of or around the surgical area  · Temperature over 100.5  · Nausea and vomiting lasting longer than 4 hours or if unable to take medications  · Any signs of decreased circulation or nerve impairment to extremity: change in color, persistent  numbness, tingling, coldness or increase pain  · Any questions    What to do at Home:  Recommended activity: Activity as tolerated    If you experience any of the following symptoms chest pain, shortness of breath, nausea/vomiting, or fever, please follow up with Parth Lopes NP.    *  Please give a list of your current medications to your Primary Care Provider. *  Please update this list whenever your medications are discontinued, doses are      changed, or new medications (including over-the-counter products) are added. *  Please carry medication information at all times in case of emergency situations. These are general instructions for a healthy lifestyle:    No smoking/ No tobacco products/ Avoid exposure to second hand smoke  Surgeon General's Warning:  Quitting smoking now greatly reduces serious risk to your health. Obesity, smoking, and sedentary lifestyle greatly increases your risk for illness    A healthy diet, regular physical exercise & weight monitoring are important for maintaining a healthy lifestyle    You may be retaining fluid if you have a history of heart failure or if you experience any of the following symptoms:  Weight gain of 3 pounds or more overnight or 5 pounds in a week, increased swelling in our hands or feet or shortness of breath while lying flat in bed. Please call your doctor as soon as you notice any of these symptoms; do not wait until your next office visit. Patient armband removed and shredded      The discharge information has been reviewed with the patient and spouse. The patient and spouse verbalized understanding. Discharge medications reviewed with the patient and spouse and appropriate educational materials and side effects teaching were provided.   ___________________________________________________________________________________________________________________________________

## 2021-05-13 NOTE — PROGRESS NOTES
Attempted to do dressing changes to bilateral lower extrem. so pt's wife could be educated on what to do. Pt's states, 'I have seen it done so may times, I can just tell her (the wife) what to do. Pt refused for dressings to be changed and wife to be educated on dressing changes. Explained to pt and pt's wife the importance of changing dressing on pt legs and to teach her on what to do. I gave the pt and the pt's wife a verbal demonstration. Pt was sent home with supplies until they are able to purchase items to change dressing to lower extrem.

## 2021-05-14 LAB
C-ANCA TITR SER IF: NORMAL TITER
MYELOPEROXIDASE AB SER IA-ACNC: <9 U/ML (ref 0–9)
P-ANCA ATYPICAL TITR SER IF: NORMAL TITER
P-ANCA TITR SER IF: NORMAL TITER
PROTEINASE3 AB SER IA-ACNC: <3.5 U/ML (ref 0–3.5)

## 2021-05-14 NOTE — CONSULTS
[facility] Name:  Reynaldo Sterling 
MR#:   [id number] :  1960 ACCOUNT #:  [de-identified] DATE OF SERVICE:  2021 HISTORY OF CHIEF COMPLAINT:  This is a 64-year-old  male who is recently admitted to [facility] with swelling in his lower extremities. He carries a diagnosis of cellulitis. His skin was biopsied and he was found to have neutrophilic vasculitis as well as cellulitis. I was asked to see the patient to evaluate for his vasculitis situation. He notes that he has had cellulitis in his upper extremities previously, but never in his lower extremities. He notes a history of painful joints in multiple areas such as his shoulders, wrists, hands, knees, and occasionally his feet. 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 He said he was recently released because of inability to carry out his work duties and missing work. He notes morning stiffness lasts about 30 minutes. His pain level today is about 6/10. He has noticed some chills and fevers, but he is not exactly sure as the exact date. No other level of fever that he had. He denies a history of diabetes. MEDICATIONS:  His current medications include amlodipine 10 mg daily, Augmentin 875 mg every 12 hours, aspirin 81 mg daily, Cipro 750 mg every 12 hours, Lovenox injections 40 mg subcutaneous daily, folic acid 1 mg daily, hydralazine 50 mg every 8 hours, metoprolol 100 mg every 12 hours, therapeutic multivitamins one tablet a day, thiamine 100 mg tablets daily. He has also received ketorolac 15 mg IV during the course of his admission. Other medications that he has received during this admission were vancomycin. ALLERGIES:  NO KNOWN DRUG ALLERGIES. PAST MEDICAL HISTORY:  He has a history of degenerative arthritis. PAST SURGICAL HISTORY:  He had surgery as a child for hernia repair. FAMILY HISTORY:  His parents are alive. He has one sister, no brothers.   He is  and has three stepchildren. PHYSICAL EXAMINATION: 
GENERAL:  He is alert, in no acute distress. VITAL SIGNS:  He is afebrile and vital signs are stable. His height is 5 feet 11 inches, weight 86.2 kg. His BMI is elevated. His blood pressure is 161/91, pulse 112. HEENT:  Eyes are PERRLA. EOMI. Ear, nose, and throat clear. NECK:  Supple. LUNGS:  Clear to auscultation. HEART:  Regular rate and rhythm. ABDOMEN:  Soft, nontender. Bowel sounds are active. /RECTAL:  Examination deferred. MUSCULOSKELETAL:  Palpable tenderness of shoulders. Wrist minimally tender to palpation. He has palpable tenderness of his MTP, his PIP, and DIP joints. There are Heberden's and Osmin's nodes noted on his hands. Knees minimally tender to palpation. I removed the bandage from his right leg, and there are ecchymosis noted along with swelling, erythema, and some warmth. There is scattered redness over the lower part of the extremity. GAIT:  Not evaluated. NEUROLOGIC:  Cranial nerves II through XII are grossly intact. LABORATORY DATA:  Recent labs revealed the following:  Labs from 05/08/2021 shows WBC count of 13.1, hemoglobin 11.4, hematocrit 33.6. Serum sodium 130, potassium 4.2, creatinine 0.9, GFR greater than 60, calcium 8.2 with normal range 8.5 to 10.1. C4 is 26 which is within normal limits. Magnesium is 2.2 which is within normal limits. His ANCA is pending as well as his OPAL, anti-SM, and anti RNP. 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 His serum glucose has been 101. He is not known to be diabetic. 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 His ANCA profile currently is pending. However, at this point I think he wants aggressive treatment for his necrotizing vasculitis. He also has a history of osteoarthritis and clinically there is evidence of osteoarthritis of his hands at this time.  
 
RECOMMENDATIONS:  My recommendation at this time would be to add prednisone *** mg daily to his regime and to also start Cytoxan 25 mg every 12 hours. Follow his urine closely as well as his CBC and platelet count. I would like to see him in my office in one week. He is to call earlier if his situation gets worse. Case was discussed with his attending Dr. Bharat Hubbard. Thank you very much for allowing me an opportunity to participate in the care of this most interesting gentleman. Gilma Go MD [id number] 
D:  05/13/2021 16:04 
T:  05/13/2021 20:34 
JOB #:  [id number]

## 2021-05-18 LAB — CANNABINOIDS BLD CFM-MCNC: <7 IU (ref 0–15)

## 2021-05-18 NOTE — PROGRESS NOTES
Physician Progress Note      Chance Torres  CSN #:                  [id number]  :                       1960  ADMIT DATE:       2021 7:43 PM  [address] [place] DATE:        2021 6:23 PM  RESPONDING  PROVIDER #:        Melisa Kohler MD          QUERY TEXT:    Dear hospitalist,    Patient admitted with cellulitis of lower extremities. Noted documentation of sepsis not POA per Dr. Paola Dent in progress note on 2021 and sepsis POA per Dr. Paulina Fontaine in discharge summary on 2021. If possible, please document in progress notes and discharge summary if you are evaluating and /or treating any of the following: The medical record reflects the following:  Risk Factors: cellulitis of lower extremities, gangrene, JENN    Clinical Indicators: \"This patient has sepsis that was not present on admission. \"  per Dr. Paola Dent, progress note, 2021. \"SEPSIS with tachy and leukocytosis due to # 2, POA\"  per Dr. Paulina Fontaine, discharge summary, 2021.   WBC:  18.6 on 2021 ---> 17.9 on 2021 ---> 13.8 on 2021 ---> 9.3 on 2021  CRP:  >9.5 on 2021  Temperature:  100.1 on admission ---> 101.9 on 2021 @ 2330  ---> 98.6 on 2021 @ 1338 ---> 98.6 on 2021 @ discharge  heart rate:  121 on admission ---> range:  106 to 114 on 2021 to 2021 @ 0305 ---> 105 to 115 on 2021 @ 2033  to 2021 @ 0318 then 75 to 110 for rest of admission    Treatment: Augmentin po x2 doses on 2021 to 2021, cefepime 2g IV x9 doses on 2021 to 5/10/2021, ciprofloxacin 750mg po x2 doses on 2021 to 2021, vancomycin 1,000mg IV x1 dose on 2021, vancomycin 750mg IV x1 on 2021    Thank you,  BRENDA Reeder RN, [facility]  Office:  [phone]  Options provided:  -- sepsis not POA confirmed and sepsis POA ruled out  -- sepsis POA confirmed and sepsis not POA  ruled out  -- Other - I will add my own diagnosis  -- Disagree - Not applicable / Not valid  -- Disagree - Clinically unable to determine / Unknown  -- Refer to Clinical Documentation Reviewer    PROVIDER RESPONSE TEXT:    After study, sepsis POA confirmed and sepsis not POA ruled out. Query created by:  Ai Rivas on 5/17/2021 8:57 AM      Electronically signed by:  Mushtaq Herrera MD 5/18/2021 3:18 AM

## 2021-05-21 ENCOUNTER — VIRTUAL VISIT (OUTPATIENT)
Dept: FAMILY MEDICINE CLINIC | Age: 61
End: 2021-05-21
Payer: COMMERCIAL

## 2021-05-21 DIAGNOSIS — Z12.11 SCREEN FOR COLON CANCER: ICD-10-CM

## 2021-05-21 DIAGNOSIS — I10 ESSENTIAL HYPERTENSION: Primary | ICD-10-CM

## 2021-05-21 DIAGNOSIS — Z12.5 PROSTATE CANCER SCREENING: ICD-10-CM

## 2021-05-21 LAB
14.3.3 ETA, RHEUM. ARTHRITIS: <0.2 NG/ML
CCP IGA+IGG SERPL IA-ACNC: <20 UNITS
RHEUMATOID FACT SERPL-ACNC: 15.4 UNITS/ML

## 2021-05-21 PROCEDURE — 99204 OFFICE O/P NEW MOD 45 MIN: CPT | Performed by: NURSE PRACTITIONER

## 2021-05-21 RX ORDER — METOPROLOL TARTRATE 100 MG/1
100 TABLET ORAL EVERY 12 HOURS
Qty: 60 TABLET | Refills: 1 | Status: SHIPPED | OUTPATIENT
Start: 2021-05-21 | End: 2021-12-17 | Stop reason: SDUPTHER

## 2021-05-21 RX ORDER — AMLODIPINE BESYLATE 10 MG/1
10 TABLET ORAL DAILY
Qty: 30 TABLET | Refills: 1 | Status: SHIPPED | OUTPATIENT
Start: 2021-05-21 | End: 2021-11-09 | Stop reason: SDUPTHER

## 2021-05-21 NOTE — PROGRESS NOTES
Seven Gary presents today for   Chief Complaint   Patient presents with    New Patient     establish care   [facility] Follow Up       Is someone accompanying this pt? no    Is the patient using any DME equipment during OV? no    Depression Screening:  3 most recent PHQ Screens 5/21/2021   Little interest or pleasure in doing things Several days   Feeling down, depressed, irritable, or hopeless Several days   Total Score PHQ 2 2       Learning Assessment:  No flowsheet data found. Health Maintenance reviewed and discussed and ordered per Provider. Health Maintenance Due   Topic Date Due    COVID-19 Vaccine (1) Never done    DTaP/Tdap/Td series (1 - Tdap) Never done    Shingrix Vaccine Age 50> (1 of 2) Never done    Colorectal Cancer Screening Combo  Never done   . Coordination of Care:  1. Have you been to the ER, urgent care clinic since your last visit? Hospitalized since your last visit? no    2. Have you seen or consulted any other health care providers outside of the [address] [place] [address] since your last visit? Include any pap smears or colon screening.  no

## 2021-05-21 NOTE — PROGRESS NOTES
Hayley Martinez is a 62 y.o. male who was seen by synchronous (real-time) audio-video technology on 5/21/2021 for New Patient (establish care) and Hospital Follow Up    Assessment & Plan:   Diagnoses and all orders for this visit:    1. Essential hypertension  -     amLODIPine (NORVASC) 10 mg tablet; Take 1 Tablet by mouth daily. -     metoprolol tartrate (LOPRESSOR) 100 mg IR tablet; Take 1 Tablet by mouth every twelve (12) hours. -     LIPID PANEL; Future  -     METABOLIC PANEL, COMPREHENSIVE; Future  -     CBC WITH AUTOMATED DIFF; Future    2. Screen for colon cancer  -     COLOGUARD TEST (FECAL DNA COLORECTAL CANCER SCREENING)    3. Prostate cancer screening  -     PSA SCREENING (SCREENING); Future            Subjective: The patient presents for an Audio-visual teleconference appointment for hospital follow-up. Discharge on 5/13/2021. He was admitted for bilateral lower extremity cellulitis. Patient was started on IV antibiotics and was admitted to the hospital.  Patient underwent lower extremity duplex  And biopsy. The duplex was negative. Patient's biopsy result came back vasculitis. Rheumatology was consulted, Dr. April Young saw the patient in the hospital.   Rheumatology cleared the patient for discharge and follow-up as outpatient. Patient was discharged with p.o antibiotics. Patient feels okay and notes he has mild bilaterally lower extremity edema. He carries a medical diagnosis for HTN and denies any CP or palpitation. Scheduled to follow-up with Rheumatology and Dermatology after discharge. Prior to Admission medications    Medication Sig Start Date End Date Taking? Authorizing Provider   amLODIPine (NORVASC) 10 mg tablet Take 1 Tablet by mouth daily. 5/21/21  Yes Chaka Pena NP   metoprolol tartrate (LOPRESSOR) 100 mg IR tablet Take 1 Tablet by mouth every twelve (12) hours. 5/21/21  Yes Chaka Pena NP   aspirin delayed-release 81 mg tablet Take 1 Tab by mouth daily. 5/13/21  Yes Bety Lord MD   cyclophosphamide (CYTOXAN) 25 mg capsule Take 1 Cap by mouth every twelve (12) hours. 5/13/21  Yes Bety Lord MD   hydrALAZINE (APRESOLINE) 50 mg tablet Take 1 Tab by mouth every eight (8) hours. 5/13/21  Yes Bety Lord MD   predniSONE (DELTASONE) 5 mg tablet Take 3 Tabs by mouth daily for 30 days. 5/13/21 6/12/21 Yes Bety Lord MD   therapeutic multivitamin [facility]) tablet Take 1 Tab by mouth daily. 5/13/21  Yes Bety Lord MD   famotidine (Pepcid) 40 mg tablet Take 1 Tab by mouth daily. Patient not taking: Reported on 5/21/2021 5/13/21   Bety Lord MD     Patient Active Problem List   Diagnosis Code    Sepsis [facility]) A41.9     Patient Active Problem List    Diagnosis Date Noted    Sepsis ([facility] [address] 05/05/2021     Current Outpatient Medications   Medication Sig Dispense Refill    amLODIPine (NORVASC) 10 mg tablet Take 1 Tablet by mouth daily. 30 Tablet 1    metoprolol tartrate (LOPRESSOR) 100 mg IR tablet Take 1 Tablet by mouth every twelve (12) hours. 60 Tablet 1    aspirin delayed-release 81 mg tablet Take 1 Tab by mouth daily. 30 Tab 0    cyclophosphamide (CYTOXAN) 25 mg capsule Take 1 Cap by mouth every twelve (12) hours. 60 Cap 0    hydrALAZINE (APRESOLINE) 50 mg tablet Take 1 Tab by mouth every eight (8) hours. 90 Tab 0    predniSONE (DELTASONE) 5 mg tablet Take 3 Tabs by mouth daily for 30 days. 90 Tab 0    therapeutic multivitamin (THERAGRAN) tablet Take 1 Tab by mouth daily. 30 Tab 0    famotidine (Pepcid) 40 mg tablet Take 1 Tab by mouth daily.  (Patient not taking: Reported on 5/21/2021) 30 Tab 0     No Known Allergies    ROS    ROS:  History obtained from the patient intake forms which are reviewed with the patient  · General: negative for - chills, fever, weight changes or malaise  · HEENT: no sore throat, nasal congestion, vision problems or ear problems  · Respiratory: no cough, shortness of breath, or wheezing  · Cardiovascular: no chest pain, palpitations, or dyspnea on exertion  · Gastrointestinal: no abdominal pain, N/V, change in bowel habits, or black or bloody stools  · Musculoskeletal: lower extremity cellulitis  · Neurological: no numbness, tingling, headache or dizziness  · Endo:  No polyuria or polydipsia. · : no hematuria, dysuria, frequency, hesitancy, or nocturia. · Psychological: negative for - anxiety, depression, sleep disturbances, suicidal or homicidal ideations    Objective:   No flowsheet data found.      [INSTRUCTIONS:  \"[x]\" Indicates a positive item  \"[]\" Indicates a negative item  -- DELETE ALL ITEMS NOT EXAMINED]    Constitutional: [x] Appears well-developed and well-nourished [x] No apparent distress      [] Abnormal -     Mental status: [x] Alert and awake  [x] Oriented to person/place/time [x] Able to follow commands    [] Abnormal -     Eyes:   EOM    [x]  Normal    [] Abnormal -   Sclera  [x]  Normal    [] Abnormal -          Discharge [x]  None visible   [] Abnormal -     HENT: [x] Normocephalic, atraumatic  [] Abnormal -   [x] Mouth/Throat: Mucous membranes are moist    External Ears [x] Normal  [] Abnormal -    Neck: [x] No visualized mass [] Abnormal -     Pulmonary/Chest: [x] Respiratory effort normal   [x] No visualized signs of difficulty breathing or respiratory distress        [] Abnormal -      Musculoskeletal:   [x] Normal gait with no signs of ataxia         [x] Normal range of motion of neck        [] Abnormal -     Neurological:        [x] No Facial Asymmetry (Cranial nerve 7 motor function) (limited exam due to video visit)          [x] No gaze palsy        [] Abnormal -          Skin:        [x] No significant exanthematous lesions or discoloration noted on facial skin         [] Abnormal -            Psychiatric:       [x] Normal Affect [] Abnormal -        [x] No Hallucinations    Other pertinent observable physical exam findings:-        We discussed the expected course, resolution and complications of the diagnosis(es) in detail. Medication risks, benefits, costs, interactions, and alternatives were discussed as indicated. I advised him to contact the office if his condition worsens, changes or fails to improve as anticipated. He expressed understanding with the diagnosis(es) and plan. Valery Schlatter, was evaluated through a synchronous (real-time) audio-video encounter. The patient (or guardian if applicable) is aware that this is a billable service. Verbal consent to proceed has been obtained within the past 12 months. The visit was conducted pursuant to the emergency declaration under the [facility], [address] [place] [address] authority and the [employer] General Act. Patient identification was verified, and a caregiver was present when appropriate. The patient was located in a state where the provider was credentialed to provide care.       Lobo Berg NP

## 2021-06-07 LAB
BACTERIA SPEC CULT: NORMAL
SERVICE CMNT-IMP: NORMAL

## 2021-06-21 LAB
ACID FAST STN SPEC: NEGATIVE
MYCOBACTERIUM SPEC QL CULT: NEGATIVE
SPECIMEN PREPARATION: NORMAL
SPECIMEN SOURCE: NORMAL

## 2021-07-14 ENCOUNTER — OFFICE VISIT (OUTPATIENT)
Dept: FAMILY MEDICINE CLINIC | Age: 61
End: 2021-07-14
Payer: COMMERCIAL

## 2021-07-14 VITALS
SYSTOLIC BLOOD PRESSURE: 142 MMHG | OXYGEN SATURATION: 98 % | BODY MASS INDEX: 36.36 KG/M2 | HEIGHT: 70 IN | TEMPERATURE: 98.4 F | WEIGHT: 254 LBS | HEART RATE: 70 BPM | RESPIRATION RATE: 16 BRPM | DIASTOLIC BLOOD PRESSURE: 90 MMHG

## 2021-07-14 DIAGNOSIS — I10 ESSENTIAL HYPERTENSION: ICD-10-CM

## 2021-07-14 DIAGNOSIS — L03.116 CELLULITIS OF LEFT LOWER EXTREMITY: ICD-10-CM

## 2021-07-14 DIAGNOSIS — L95.8 NEUTROPHILIC VASCULITIS OF SKIN: Primary | ICD-10-CM

## 2021-07-14 PROCEDURE — 99213 OFFICE O/P EST LOW 20 MIN: CPT | Performed by: NURSE PRACTITIONER

## 2021-07-14 NOTE — PROGRESS NOTES
Raul Rangel presents today for   Chief Complaint   Patient presents with   [facility] Hypertension   [facility] Follow Up       Is someone accompanying this pt? no    Is the patient using any DME equipment during [address]? no    Depression Screening:  3 most recent PHQ Screens 7/14/2021   Little interest or pleasure in doing things Not at all   Feeling down, depressed, irritable, or hopeless Not at all   Total Score PHQ 2 0       Learning Assessment:  Learning Assessment 5/21/2021   PRIMARY LEARNER Patient   HIGHEST LEVEL OF EDUCATION - PRIMARY LEARNER  SOME COLLEGE   BARRIERS PRIMARY LEARNER NONE   PRIMARY LANGUAGE ENGLISH   LEARNER PREFERENCE PRIMARY LISTENING   ANSWERED BY patient   RELATIONSHIP SELF       Health Maintenance reviewed and discussed and ordered per Provider. Health Maintenance Due   Topic Date Due    DTaP/Tdap/Td series (1 - Tdap) Never done    Colorectal Cancer Screening Combo  Never done    Shingrix Vaccine Age 50> (1 of 2) Never done   . Coordination of Care:  1. Have you been to the ER, urgent care clinic since your last visit? Hospitalized since your last visit? no    2. Have you seen or consulted any other health care providers outside of the [address] [place] [address] since your last visit? Include any pap smears or colon screening.  no

## 2021-07-21 NOTE — PROGRESS NOTES
Ramirez Monterroso is a 62 y.o. male presenting today for Hypertension and Hospital Follow Up  . Chief Complaint   Patient presents with   [facility] Hypertension   [facility] Follow Up       HPI:  Ramirez Monterroso presents to the office today for hypertension follow-up care. He feels well and denies any cough, fever, loss of smell or taste or GI upset. His BP today is 142/90. Prescribed Amlodipine and Metoprolol daily. Compliant with the medication treatment plan. Patient was admitted to [facility] on May 5, 2021 and discharged on May 13, 2021. He was admitted for bilateral lower extremity cellulitis with polymicrobial infection. Patient also was diagnosed with neutrophilic vasculitis. He also has a diagnosis of arthritis and patient questions whether he has rheumatoid. He notes he would like to see a rheumatologist and dermatologist.  He reports he is feeling better and denies any chest pain, palpitation, headache or dizziness. ROS  ROS:  History obtained from the patient intake forms which are reviewed with the patient  · General: negative for - chills, fever, weight changes or malaise  · HEENT: no sore throat, nasal congestion, vision problems or ear problems  · Respiratory: no cough, shortness of breath, or wheezing  · Cardiovascular: no chest pain, palpitations, or dyspnea on exertion  · Gastrointestinal: no abdominal pain, N/V, change in bowel habits, or black or bloody stools  · Musculoskeletal: no back pain, joint pain, joint stiffness, muscle pain or muscle weakness  · Neurological: no numbness, tingling, headache or dizziness  · Endo:  No polyuria or polydipsia. · : no hematuria, dysuria, frequency, hesitancy, or nocturia.     · Psychological: negative for - anxiety, depression, sleep disturbances, suicidal or homicidal ideations    No Known Allergies    PHQ Screening   3 most recent PHQ Screens 7/14/2021   Little interest or pleasure in doing things Not at all   Feeling down, depressed, irritable, or hopeless Not at all   Total Score PHQ 2 0       History  History reviewed. No pertinent past medical history. Past Surgical History:   Procedure Laterality Date    HX ORTHOPAEDIC         Social History     Socioeconomic History    Marital status:      Spouse name: Not on file    Number of children: Not on file    Years of education: Not on file    Highest education level: Not on file   Occupational History    Not on file   Tobacco Use    Smoking status: Former Smoker     Types: Cigarettes     Quit date: 3/26/1998     Years since quittin.3    Smokeless tobacco: Never Used    Tobacco comment: AROUND WIFE WHO SMOKES/2ND HAND SMOKE   Substance and Sexual Activity    Alcohol use: Yes     Alcohol/week: 18.0 standard drinks     Types: 18 Cans of beer per week    Drug use: No    Sexual activity: Not on file   Other Topics Concern    Not on file   Social History Narrative    Not on file     Social Determinants of Health     Financial Resource Strain:     Difficulty of Paying Living Expenses:    Food Insecurity:     Worried About Running Out of Food in the Last Year:     [address] in the Last Year:    Transportation Needs:     Lack of Transportation (Medical):      Lack of Transportation (Non-Medical):    Physical Activity:     Days of Exercise per Week:     Minutes of Exercise per Session:    Stress:     Feeling of Stress :    Social Connections:     Frequency of Communication with Friends and Family:     Frequency of Social Gatherings with Friends and Family:     Attends Mandaen Services:     Active Member of Clubs or Organizations:     Attends Club or Organization Meetings:     Marital Status:    Intimate Partner Violence:     Fear of Current or Ex-Partner:     Emotionally Abused:     Physically Abused:     Sexually Abused:        Current Outpatient Medications   Medication Sig Dispense Refill    amLODIPine (NORVASC) 10 mg tablet Take 1 Tablet by mouth daily. 30 Tablet 1    metoprolol tartrate (LOPRESSOR) 100 mg IR tablet Take 1 Tablet by mouth every twelve (12) hours. 60 Tablet 1    aspirin delayed-release 81 mg tablet Take 1 Tab by mouth daily. 30 Tab 0    hydrALAZINE (APRESOLINE) 50 mg tablet Take 1 Tab by mouth every eight (8) hours. 90 Tab 0    cyclophosphamide (CYTOXAN) 25 mg capsule Take 1 Cap by mouth every twelve (12) hours. (Patient not taking: Reported on 7/14/2021) 60 Cap 0    therapeutic multivitamin (THERAGRAN) tablet Take 1 Tab by mouth daily. (Patient not taking: Reported on 7/14/2021) 30 Tab 0    famotidine (Pepcid) 40 mg tablet Take 1 Tab by mouth daily. (Patient not taking: Reported on 5/21/2021) 30 Tab 0         Vitals:    07/14/21 1048 07/14/21 1124   BP: (!) 153/90 (!) 142/90   Pulse: 70    Resp: 16    Temp: 98.4 °F (36.9 °C)    TempSrc: Oral    SpO2: 98%    Weight: 254 lb (115.2 kg)    Height: 5' 10\" (1.778 m)    PainSc:   0 - No pain        Physical Exam  Vitals and nursing note reviewed. Constitutional:       Appearance: Normal appearance. HENT:      Head: Normocephalic. Cardiovascular:      Rate and Rhythm: Normal rate and regular rhythm. Pulses: Normal pulses. Heart sounds: Normal heart sounds. Pulmonary:      Effort: Pulmonary effort is normal.      Breath sounds: Normal breath sounds. Abdominal:      General: Bowel sounds are normal.      Palpations: Abdomen is soft. Musculoskeletal:      Right lower leg: Edema (mild) present. Left lower leg: Edema (mild) present. Skin:     General: Skin is warm and dry. Neurological:      General: No focal deficit present. Mental Status: He is alert. No results displayed because visit has over 200 results.       Admission on 04/20/2021, Discharged on 04/21/2021   Component Date Value Ref Range Status    Lactic Acid (POC) 04/20/2021 0.92  0.40 - 2.00 mmol/L Final    WBC 04/20/2021 7.4  4.6 - 13.2 K/uL Final    RBC 04/20/2021 4.20* 4.35 - 5.65 M/uL Final    HGB 04/20/2021 13.3  13.0 - 16.0 g/dL Final    HCT 04/20/2021 38.8  36.0 - 48.0 % Final    MCV 04/20/2021 92.4  74.0 - 97.0 FL Final    MCH 04/20/2021 31.7  24.0 - 34.0 PG Final    MCHC 04/20/2021 34.3  31.0 - 37.0 g/dL Final    RDW 04/20/2021 12.7  11.6 - 14.5 % Final    PLATELET 17/35/6712 694  135 - 420 K/uL Final    MPV 04/20/2021 9.4  9.2 - 11.8 FL Final    NEUTROPHILS 04/20/2021 67  40 - 73 % Final    LYMPHOCYTES 04/20/2021 16* 21 - 52 % Final    MONOCYTES 04/20/2021 12* 3 - 10 % Final    EOSINOPHILS 04/20/2021 3  0 - 5 % Final    BASOPHILS 04/20/2021 1  0 - 2 % Final    ABS. NEUTROPHILS 04/20/2021 5.0  1.8 - 8.0 K/UL Final    ABS. LYMPHOCYTES 04/20/2021 1.2  0.9 - 3.6 K/UL Final    ABS. MONOCYTES 04/20/2021 0.9  0.05 - 1.2 K/UL Final    ABS. EOSINOPHILS 04/20/2021 0.2  0.0 - 0.4 K/UL Final    ABS. BASOPHILS 04/20/2021 0.1  0.0 - 0.1 K/UL Final    DF 04/20/2021 AUTOMATED    Final    Sodium 04/20/2021 136  136 - 145 mmol/L Final    Potassium 04/20/2021 3.7  3.5 - 5.5 mmol/L Final    Chloride 04/20/2021 101  100 - 111 mmol/L Final    CO2 04/20/2021 29  21 - 32 mmol/L Final    Anion gap 04/20/2021 6  3.0 - 18 mmol/L Final    Glucose 04/20/2021 93  74 - 99 mg/dL Final    BUN 04/20/2021 20* 7.0 - 18 MG/DL Final    Creatinine 04/20/2021 0.86  0.6 - 1.3 MG/DL Final    BUN/Creatinine ratio 04/20/2021 23* 12 - 20   Final    GFR est AA 04/20/2021 >60  >60 ml/min/1.73m2 Final    GFR est non-AA 04/20/2021 >60  >60 ml/min/1.73m2 Final    Comment: (NOTE)  Estimated GFR is calculated using the Modification of Diet in Renal   Disease (MDRD) Study equation, reported for both  Americans   (GFRAA) and non- Americans (GFRNA), and normalized to 1.73m2   body surface area. The physician must decide which value applies to   the patient. The MDRD study equation should only be used in   individuals age 25 or older.  It has not been validated for the   following: pregnant women, patients with serious comorbid conditions,   or on certain medications, or persons with extremes of body size,   muscle mass, or nutritional status.  Calcium 04/20/2021 8.9  8.5 - 10.1 MG/DL Final    Special Requests: 04/20/2021 NO SPECIAL REQUESTS    Final    Culture result: 04/20/2021 NO GROWTH 6 DAYS    Final    Special Requests: 04/20/2021 NO SPECIAL REQUESTS    Final    Culture result: 04/20/2021 NO GROWTH 6 DAYS    Final    Lyme Ab, IgG/IgM 04/20/2021 <0.91  0.00 - 0.90 ISR Final    Comment: (NOTE)                                 Negative         <0.91                                 Equivocal  0.91 - 1.09                                 Positive         >1.09  Performed At: [facility]  [facility] [address] [id number]  Hong Becker MD OX:[id number]      Lyme Ab Interp. ,EIA 04/20/2021 <<DO NOT REPORT>>    Final    Lyme Ab Interp. ,EIA 04/20/2021 <<DO NOT REPORT>>    Final    R. rickettsii IgM 04/20/2021 0.21  0.00 - 0.89 index Final    Comment: (NOTE)                                  Negative        <0.90                                  Equivocal 0.90 - 1.10                                  Positive        >1.10  Performed At: [facility]  [facility] [address] [id number]  Hong Becker MD TS:[id number]      Color 04/20/2021 YELLOW    Final    Appearance 04/20/2021 CLEAR    Final    Specific gravity 04/20/2021 1.007  1.005 - 1.030   Final    pH (UA) 04/20/2021 5.5  5.0 - 8.0   Final    Protein 04/20/2021 Negative  NEG mg/dL Final    Glucose 04/20/2021 Negative  NEG mg/dL Final    Ketone 04/20/2021 Negative  NEG mg/dL Final    Bilirubin 04/20/2021 Negative  NEG   Final    Blood 04/20/2021 Negative  NEG   Final    Urobilinogen 04/20/2021 0.2  0.2 - 1.0 EU/dL Final    Nitrites 04/20/2021 Negative  NEG   Final    Leukocyte Esterase 04/20/2021 Negative  NEG   Final       No results found for any visits on 07/14/21.     Patient Care Team:  Patient Care Team:  Jaycob Velasco NP as PCP - General (Nurse Practitioner)  Jaycob Velasco NP as PCP - Aileen Lemus Provider      Assessment / Plan:      ICD-10-CM ICD-9-CM    1. Neutrophilic vasculitis of skin  L95.8 709.1 REFERRAL TO RHEUMATOLOGY      REFERRAL TO DERMATOLOGY   2. Cellulitis of left lower extremity  L03.116 682.6 REFERRAL TO DERMATOLOGY   3. Essential hypertension  I10 401.9              I asked the patient if he  had any questions and answered his  questions. The patient stated that he understands the treatment plan and agrees with the treatment plan    This document was created with a voice activated dictation system and may contain transcription errors.

## 2021-08-18 ENCOUNTER — OFFICE VISIT (OUTPATIENT)
Dept: FAMILY MEDICINE CLINIC | Age: 61
End: 2021-08-18
Payer: COMMERCIAL

## 2021-08-18 VITALS
HEART RATE: 70 BPM | BODY MASS INDEX: 38.65 KG/M2 | SYSTOLIC BLOOD PRESSURE: 143 MMHG | DIASTOLIC BLOOD PRESSURE: 86 MMHG | HEIGHT: 70 IN | TEMPERATURE: 98.6 F | OXYGEN SATURATION: 97 % | WEIGHT: 270 LBS | RESPIRATION RATE: 16 BRPM

## 2021-08-18 DIAGNOSIS — I10 ESSENTIAL HYPERTENSION: Primary | ICD-10-CM

## 2021-08-18 PROCEDURE — 99213 OFFICE O/P EST LOW 20 MIN: CPT | Performed by: NURSE PRACTITIONER

## 2021-08-18 RX ORDER — HYDRALAZINE HYDROCHLORIDE 50 MG/1
50 TABLET, FILM COATED ORAL EVERY 8 HOURS
Qty: 90 TABLET | Refills: 0 | Status: CANCELLED | OUTPATIENT
Start: 2021-08-18

## 2021-08-18 NOTE — PROGRESS NOTES
Ramirez Monterroso is a 62 y.o. male presenting today for Hypertension (follow-up)  . Chief Complaint   Patient presents with    Hypertension     follow-up       HPI:  Ramirez Monterroso presents to the office today for hypertension follow-up care. He feels well and denies any cough, fever, loss of smell or taste or GI upset. His BP today is 142/90. Prescribed Amlodipine and Metoprolol daily. Compliant with the medication treatment plan. He reports he is feeling better and denies any chest pain, palpitation, headache or dizziness. ROS  ROS:  History obtained from the patient intake forms which are reviewed with the patient  · General: negative for - chills, fever, weight changes or malaise  · HEENT: no sore throat, nasal congestion, vision problems or ear problems  · Respiratory: no cough, shortness of breath, or wheezing  · Cardiovascular: no chest pain, palpitations, or dyspnea on exertion  · Gastrointestinal: no abdominal pain, N/V, change in bowel habits, or black or bloody stools  · Musculoskeletal: no back pain, joint pain, joint stiffness, muscle pain or muscle weakness  · Neurological: no numbness, tingling, headache or dizziness  · Endo:  No polyuria or polydipsia. · : no hematuria, dysuria, frequency, hesitancy, or nocturia. · Psychological: negative for - anxiety, depression, sleep disturbances, suicidal or homicidal ideations    No Known Allergies    PHQ Screening   3 most recent PHQ Screens 8/18/2021   Little interest or pleasure in doing things Not at all   Feeling down, depressed, irritable, or hopeless Not at all   Total Score PHQ 2 0       History  History reviewed. No pertinent past medical history.     Past Surgical History:   Procedure Laterality Date    HX ORTHOPAEDIC         Social History     Socioeconomic History    Marital status:      Spouse name: Not on file    Number of children: Not on file    Years of education: Not on file    Highest education level: Not on file Occupational History    Not on file   Tobacco Use    Smoking status: Former Smoker     Types: Cigarettes     Quit date: 3/26/1998     Years since quittin.4    Smokeless tobacco: Never Used    Tobacco comment: AROUND WIFE WHO SMOKES/2ND HAND SMOKE   Substance and Sexual Activity    Alcohol use: Yes     Alcohol/week: 18.0 standard drinks     Types: 18 Cans of beer per week    Drug use: No    Sexual activity: Not on file   Other Topics Concern    Not on file   Social History Narrative    Not on file     Social Determinants of Health     Financial Resource Strain:     Difficulty of Paying Living Expenses:    Food Insecurity:     Worried About Running Out of Food in the Last Year:     [address] in the Last Year:    Transportation Needs:     Lack of Transportation (Medical):  Lack of Transportation (Non-Medical):    Physical Activity:     Days of Exercise per Week:     Minutes of Exercise per Session:    Stress:     Feeling of Stress :    Social Connections:     Frequency of Communication with Friends and Family:     Frequency of Social Gatherings with Friends and Family:     Attends [employer] Services:     Active Member of Clubs or Organizations:     Attends Club or Organization Meetings:     Marital Status:    Intimate Partner Violence:     Fear of Current or Ex-Partner:     Emotionally Abused:     Physically Abused:     Sexually Abused:        Current Outpatient Medications   Medication Sig Dispense Refill    amLODIPine (NORVASC) 10 mg tablet Take 1 Tablet by mouth daily. 30 Tablet 1    metoprolol tartrate (LOPRESSOR) 100 mg IR tablet Take 1 Tablet by mouth every twelve (12) hours. 60 Tablet 1    aspirin delayed-release 81 mg tablet Take 1 Tab by mouth daily. 30 Tab 0    hydrALAZINE (APRESOLINE) 50 mg tablet Take 1 Tab by mouth every eight (8) hours. 90 Tab 0    cyclophosphamide (CYTOXAN) 25 mg capsule Take 1 Cap by mouth every twelve (12) hours.  (Patient not taking: Reported on 7/14/2021) 60 Cap 0    therapeutic multivitamin (THERAGRAN) tablet Take 1 Tab by mouth daily. (Patient not taking: Reported on 7/14/2021) 30 Tab 0    famotidine (Pepcid) 40 mg tablet Take 1 Tab by mouth daily. (Patient not taking: Reported on 5/21/2021) 30 Tab 0         Vitals:    08/18/21 1046   BP: (!) 143/86   Pulse: 70   Resp: 16   Temp: 98.6 °F (37 °C)   TempSrc: Oral   SpO2: 97%   Weight: 270 lb (122.5 kg)   Height: 5' 10\" (1.778 m)   PainSc:   0 - No pain     Physical Exam  Vitals and nursing note reviewed. Constitutional:       Appearance: Normal appearance. HENT:      Head: Normocephalic. Cardiovascular:      Rate and Rhythm: Normal rate and regular rhythm. Pulses: Normal pulses. Heart sounds: Normal heart sounds. Pulmonary:      Effort: Pulmonary effort is normal.      Breath sounds: Normal breath sounds. Musculoskeletal:    Negative for lower extremity edema  Skin:     General: Skin is warm and dry. No visits with results within 3 Month(s) from this visit. Latest known visit with results is:   No results displayed because visit has over 200 results. No results found for any visits on 08/18/21. Patient Care Team:  Patient Care Team:  Deborah Vyas NP as PCP - General (Nurse Practitioner)  Deborah Vyas NP as PCP - [facility] Provider      Assessment / Plan:      ICD-10-CM ICD-9-CM    1. Essential hypertension  I10 401.9      Was also given hydralazine at discharge from the hospital.  Only not taking the prescription. Patient BP is controlled we will follow-up in 6 to 8 weeks. Hold hydralazine for now for now  Continue current treatment plan. Negative for side effects or adverse reactions. I asked the patient if he  had any questions and answered his  questions.   The patient stated that he understands the treatment plan and agrees with the treatment plan    This document was created with a voice activated dictation system and may contain transcription errors.

## 2021-08-18 NOTE — PROGRESS NOTES
Tisha Mcardle presents today for   Chief Complaint   Patient presents with    Hypertension     follow-up       Is someone accompanying this pt? [email or web address]    Is the patient using any DME equipment during OV? no    Depression Screening:  3 most recent PHQ Screens 8/18/2021   Little interest or pleasure in doing things Not at all   Feeling down, depressed, irritable, or hopeless Not at all   Total Score PHQ 2 0       Learning Assessment:  Learning Assessment 5/21/2021   PRIMARY LEARNER Patient   HIGHEST LEVEL OF EDUCATION - PRIMARY LEARNER  SOME COLLEGE   BARRIERS PRIMARY LEARNER NONE   PRIMARY LANGUAGE ENGLISH   LEARNER PREFERENCE PRIMARY LISTENING   ANSWERED BY patient   RELATIONSHIP SELF       Abuse Screening:  Abuse Screening Questionnaire 8/18/2021   Do you ever feel afraid of your partner? N   Are you in a relationship with someone who physically or mentally threatens you? N   Is it safe for you to go home? Y       Fall Risk  Fall Risk Assessment, last 12 mths 3/18/2021   Able to walk? Yes   Fall in past 12 months? 0       Health Maintenance reviewed and discussed and ordered per Provider. Health Maintenance Due   Topic Date Due    DTaP/Tdap/Td series (1 - Tdap) Never done    Colorectal Cancer Screening Combo  Never done    Shingrix Vaccine Age 50> (1 of 2) Never done   . Coordination of Care:  1. Have you been to the ER, urgent care clinic since your last visit? Hospitalized since your last visit? no    2. Have you seen or consulted any other health care providers outside of the [address] [place] [address] since your last visit? Include any pap smears or colon screening.  no

## 2021-11-09 DIAGNOSIS — I10 ESSENTIAL HYPERTENSION: ICD-10-CM

## 2021-11-10 ENCOUNTER — HOSPITAL ENCOUNTER (OUTPATIENT)
Dept: LAB | Age: 61
Discharge: HOME | End: 2021-11-10

## 2021-11-10 DIAGNOSIS — Z12.5 PROSTATE CANCER SCREENING: ICD-10-CM

## 2021-11-10 DIAGNOSIS — I10 ESSENTIAL HYPERTENSION: ICD-10-CM

## 2021-11-10 LAB
ALBUMIN SERPL-MCNC: 3.7 G/DL (ref 3.4–5)
ALBUMIN/GLOB SERPL: 1.2 {RATIO} (ref 0.8–1.7)
ALP SERPL-CCNC: 78 U/L (ref 45–117)
ALT SERPL-CCNC: 31 U/L (ref 16–61)
ANION GAP SERPL CALC-SCNC: 6 MMOL/L (ref 3–18)
AST SERPL-CCNC: 24 U/L (ref 10–38)
BASOPHILS # BLD: 0.1 K/UL (ref 0–0.1)
BASOPHILS NFR BLD: 1 % (ref 0–2)
BILIRUB SERPL-MCNC: 0.6 MG/DL (ref 0.2–1)
BUN SERPL-MCNC: 17 MG/DL (ref 7–18)
BUN/CREAT SERPL: 14 (ref 12–20)
CALCIUM SERPL-MCNC: 9.2 MG/DL (ref 8.5–10.1)
CHLORIDE SERPL-SCNC: 106 MMOL/L (ref 100–111)
CHOLEST SERPL-MCNC: 225 MG/DL
CO2 SERPL-SCNC: 26 MMOL/L (ref 21–32)
CREAT SERPL-MCNC: 1.18 MG/DL (ref 0.6–1.3)
DIFFERENTIAL METHOD BLD: NORMAL
EOSINOPHIL # BLD: 0.3 K/UL (ref 0–0.4)
EOSINOPHIL NFR BLD: 4 % (ref 0–5)
ERYTHROCYTE [DISTWIDTH] IN BLOOD BY AUTOMATED COUNT: 12.2 % (ref 11.6–14.5)
GLOBULIN SER CALC-MCNC: 3.2 G/DL (ref 2–4)
GLUCOSE SERPL-MCNC: 104 MG/DL (ref 74–99)
HCT VFR BLD AUTO: 44.7 % (ref 36–48)
HDLC SERPL-MCNC: 43 MG/DL (ref 40–60)
HDLC SERPL: 5.2 {RATIO} (ref 0–5)
HGB BLD-MCNC: 14.9 G/DL (ref 13–16)
LDLC SERPL CALC-MCNC: 138.4 MG/DL (ref 0–100)
LIPID PROFILE,FLP: ABNORMAL
LYMPHOCYTES # BLD: 1.9 K/UL (ref 0.9–3.6)
LYMPHOCYTES NFR BLD: 26 % (ref 21–52)
MCH RBC QN AUTO: 30.1 PG (ref 24–34)
MCHC RBC AUTO-ENTMCNC: 33.3 G/DL (ref 31–37)
MCV RBC AUTO: 90.3 FL (ref 78–100)
MONOCYTES # BLD: 0.7 K/UL (ref 0.05–1.2)
MONOCYTES NFR BLD: 9 % (ref 3–10)
NEUTS SEG # BLD: 4.4 K/UL (ref 1.8–8)
NEUTS SEG NFR BLD: 59 % (ref 40–73)
PLATELET # BLD AUTO: 313 K/UL (ref 135–420)
PMV BLD AUTO: 10.5 FL (ref 9.2–11.8)
POTASSIUM SERPL-SCNC: 4.1 MMOL/L (ref 3.5–5.5)
PROT SERPL-MCNC: 6.9 G/DL (ref 6.4–8.2)
PSA SERPL-MCNC: 0.2 NG/ML (ref 0–4)
RBC # BLD AUTO: 4.95 M/UL (ref 4.35–5.65)
SODIUM SERPL-SCNC: 138 MMOL/L (ref 136–145)
TRIGL SERPL-MCNC: 218 MG/DL (ref ?–150)
VLDLC SERPL CALC-MCNC: 43.6 MG/DL
WBC # BLD AUTO: 7.3 K/UL (ref 4.6–13.2)

## 2021-11-10 PROCEDURE — 36415 COLL VENOUS BLD VENIPUNCTURE: CPT

## 2021-11-10 PROCEDURE — 80053 COMPREHEN METABOLIC PANEL: CPT

## 2021-11-10 PROCEDURE — 80061 LIPID PANEL: CPT

## 2021-11-10 PROCEDURE — 84153 ASSAY OF PSA TOTAL: CPT

## 2021-11-10 PROCEDURE — 85025 COMPLETE CBC W/AUTO DIFF WBC: CPT

## 2021-11-11 RX ORDER — AMLODIPINE BESYLATE 10 MG/1
10 TABLET ORAL DAILY
Qty: 30 TABLET | Refills: 1 | Status: SHIPPED | OUTPATIENT
Start: 2021-11-11 | End: 2021-12-17 | Stop reason: SDUPTHER

## 2021-11-29 DIAGNOSIS — E78.2 MIXED HYPERLIPIDEMIA: Primary | ICD-10-CM

## 2021-11-29 RX ORDER — ATORVASTATIN CALCIUM 20 MG/1
20 TABLET, FILM COATED ORAL DAILY
Qty: 90 TABLET | Refills: 1 | Status: SHIPPED | OUTPATIENT
Start: 2021-11-29

## 2021-11-29 NOTE — PROGRESS NOTES
Please notify patient that lab results were reviewed and the results are normal.  Except cholesterol panel.  Rx called to pharmacist.

## 2021-12-17 DIAGNOSIS — I10 ESSENTIAL HYPERTENSION: ICD-10-CM

## 2021-12-19 RX ORDER — AMLODIPINE BESYLATE 10 MG/1
10 TABLET ORAL DAILY
Qty: 30 TABLET | Refills: 1 | Status: SHIPPED | OUTPATIENT
Start: 2021-12-19 | End: 2022-02-21 | Stop reason: SDUPTHER

## 2021-12-19 RX ORDER — METOPROLOL TARTRATE 100 MG/1
100 TABLET ORAL EVERY 12 HOURS
Qty: 60 TABLET | Refills: 1 | Status: SHIPPED | OUTPATIENT
Start: 2021-12-19 | End: 2022-03-29 | Stop reason: SDUPTHER

## 2022-01-27 ENCOUNTER — OFFICE VISIT (OUTPATIENT)
Dept: FAMILY MEDICINE CLINIC | Age: 62
End: 2022-01-27
Payer: COMMERCIAL

## 2022-01-27 VITALS
TEMPERATURE: 97 F | RESPIRATION RATE: 16 BRPM | HEIGHT: 70 IN | SYSTOLIC BLOOD PRESSURE: 150 MMHG | DIASTOLIC BLOOD PRESSURE: 90 MMHG | BODY MASS INDEX: 40.52 KG/M2 | HEART RATE: 69 BPM | WEIGHT: 283 LBS | OXYGEN SATURATION: 97 %

## 2022-01-27 DIAGNOSIS — I10 ESSENTIAL HYPERTENSION: ICD-10-CM

## 2022-01-27 DIAGNOSIS — R20.2 PARESTHESIA OF BOTH FEET: ICD-10-CM

## 2022-01-27 DIAGNOSIS — M17.0 OSTEOARTHRITIS OF BOTH KNEES, UNSPECIFIED OSTEOARTHRITIS TYPE: ICD-10-CM

## 2022-01-27 DIAGNOSIS — R20.2 PARESTHESIA OF BOTH HANDS: Primary | ICD-10-CM

## 2022-01-27 PROCEDURE — 99213 OFFICE O/P EST LOW 20 MIN: CPT | Performed by: NURSE PRACTITIONER

## 2022-01-27 RX ORDER — HYDRALAZINE HYDROCHLORIDE 50 MG/1
50 TABLET, FILM COATED ORAL EVERY 8 HOURS
Qty: 90 TABLET | Refills: 0 | Status: SHIPPED | OUTPATIENT
Start: 2022-01-27 | End: 2022-03-14 | Stop reason: SDUPTHER

## 2022-01-27 NOTE — PROGRESS NOTES
Randall Gonsales presents today for   Chief Complaint   Patient presents with    Hypertension     follow-up    Tingling     hands/legs       Is someone accompanying this pt? no    Is the patient using any DME equipment during OV? no    Depression Screening:  3 most recent PHQ Screens 1/27/2022   Little interest or pleasure in doing things Not at all   Feeling down, depressed, irritable, or hopeless Not at all   Total Score PHQ 2 0       Learning Assessment:  Learning Assessment 5/21/2021   PRIMARY LEARNER Patient   HIGHEST LEVEL OF EDUCATION - PRIMARY LEARNER  SOME COLLEGE   BARRIERS PRIMARY LEARNER NONE   PRIMARY LANGUAGE ENGLISH   LEARNER PREFERENCE PRIMARY LISTENING   ANSWERED BY patient   RELATIONSHIP SELF       Abuse Screening:  Abuse Screening Questionnaire 1/27/2022   Do you ever feel afraid of your partner? N   Are you in a relationship with someone who physically or mentally threatens you? N   Is it safe for you to go home? Y       Fall Risk  Fall Risk Assessment, last 12 mths 3/18/2021   Able to walk? Yes   Fall in past 12 months? 0       Health Maintenance reviewed and discussed and ordered per Provider. Health Maintenance Due   Topic Date Due    DTaP/Tdap/Td series (1 - Tdap) Never done    Shingrix Vaccine Age 50> (1 of 2) Never done   . Coordination of Care:  1. Have you been to the ER, urgent care clinic since your last visit? Hospitalized since your last visit? no    2. Have you seen or consulted any other health care providers outside of the [address] [place] [address] since your last visit? Include any pap smears or colon screening.  no

## 2022-01-27 NOTE — PROGRESS NOTES
Jasmine Kolb is a 62 y.o. male presenting today for Hypertension (follow-up) and Tingling (hands/legs)  . Chief Complaint   Patient presents with    Hypertension     follow-up    Tingling     hands/legs       HPI:  Jasmine Kolb presents to the office today for hypertension follow-up care  Complaints of paresthesia in bilateral hands. Tingling sensation started back in 2018 when he was diagnosed with cellutlits. He had difficulty holding things in his hands and will drop a pills and cups secondary to the numbness. \"I have nerve pain in my hands and feet\". They are so painful, I can stand on them. His pain is a 8/10 today. He has good days and bads days. He does believe the weather trigger his symptoms. His salud are weaker then prevous. Hypertension-her BP is elevated today her BP in office is 150/90. Currently prescribed amlodipine, negative for any side effects or adverse reaction to the medication. She denies any chest pain, palpitation, headache or dizziness. Patient takes her medication daily. Review of Systems   Constitutional: Negative for malaise/fatigue. Respiratory: Negative for cough and shortness of breath. Cardiovascular: Negative for chest pain, palpitations and leg swelling. Gastrointestinal: Negative for abdominal pain, nausea and vomiting. Genitourinary: Negative for dysuria. Musculoskeletal: Negative for back pain and myalgias. Neurological: Negative for dizziness and headaches.        No Known Allergies    PHQ Screening   3 most recent PHQ Screens 1/27/2022   Little interest or pleasure in doing things Not at all   Feeling down, depressed, irritable, or hopeless Not at all   Total Score PHQ 2 0       History  Past Medical History:   Diagnosis Date    Hypertension        Past Surgical History:   Procedure Laterality Date    HX ORTHOPAEDIC         Social History     Socioeconomic History    Marital status:      Spouse name: Not on file    Number of children: Not on file    Years of education: Not on file    Highest education level: Not on file   Occupational History    Not on file   Tobacco Use    Smoking status: Former Smoker     Types: Cigarettes     Quit date: 3/26/1998     Years since quittin.8    Smokeless tobacco: Never Used    Tobacco comment: AROUND WIFE WHO SMOKES/2ND HAND SMOKE   Substance and Sexual Activity    Alcohol use: Yes     Alcohol/week: 18.0 standard drinks     Types: 18 Cans of beer per week    Drug use: No    Sexual activity: Not on file   Other Topics Concern    Not on file   Social History Narrative    Not on file     Social Determinants of Health     Financial Resource Strain:     Difficulty of Paying Living Expenses: Not on file   Food Insecurity:     Worried About Running Out of Food in the Last Year: Not on file    Chelsea of Food in the Last Year: Not on file   Transportation Needs:     Lack of Transportation (Medical): Not on file    Lack of Transportation (Non-Medical):  Not on file   Physical Activity:     Days of Exercise per Week: Not on file    Minutes of Exercise per Session: Not on file   Stress:     Feeling of Stress : Not on file   Social Connections:     Frequency of Communication with Friends and Family: Not on file    Frequency of Social Gatherings with Friends and Family: Not on file    Attends Gnosticist Services: Not on file    Active Member of [address] [place] [address] or Organizations: Not on file    Attends Club or Organization Meetings: Not on file    Marital Status: Not on file   Intimate Partner Violence:     Fear of Current or Ex-Partner: Not on file    Emotionally Abused: Not on file    Physically Abused: Not on file    Sexually Abused: Not on file   Housing Stability:     Unable to Pay for Housing in the Last Year: Not on file    Number of [place] in the Last Year: Not on file    Unstable Housing in the Last Year: Not on file       Current Outpatient Medications   Medication Sig Dispense Refill    hydrALAZINE (APRESOLINE) 50 mg tablet Take 1 Tablet by mouth every eight (8) hours. 90 Tablet 0    metoprolol tartrate (LOPRESSOR) 100 mg IR tablet Take 1 Tablet by mouth every twelve (12) hours. 60 Tablet 1    amLODIPine (NORVASC) 10 mg tablet Take 1 Tablet by mouth daily. 30 Tablet 1    atorvastatin (LIPITOR) 20 mg tablet Take 1 Tablet by mouth daily. 90 Tablet 1    aspirin delayed-release 81 mg tablet Take 1 Tab by mouth daily. 30 Tab 0         Vitals:    01/27/22 1553 01/27/22 1614   BP: (!) 145/83 (!) 150/90   Pulse: 69    Resp: 16    Temp: 97 °F (36.1 °C)    TempSrc: Oral    SpO2: 97%    Weight: 283 lb (128.4 kg)    Height: 5' 10\" (1.778 m)    PainSc:   8        Physical Exam  Vitals and nursing note reviewed. Constitutional:       Appearance: Normal appearance. HENT:      Head: Normocephalic. Cardiovascular:      Rate and Rhythm: Normal rate and regular rhythm. Pulses: Normal pulses. Heart sounds: Normal heart sounds. Pulmonary:      Effort: Pulmonary effort is normal.      Breath sounds: Normal breath sounds. Abdominal:      General: Bowel sounds are normal.      Palpations: Abdomen is soft. Skin:     General: Skin is warm and dry. Neurological:      General: No focal deficit present. Mental Status: He is alert. Hospital Outpatient Visit on 11/10/2021   Component Date Value Ref Range Status    Prostate Specific Ag 11/10/2021 0.2  0.0 - 4.0 ng/mL Final    LIPID PROFILE 11/10/2021        Final    Cholesterol, total 11/10/2021 225* <200 MG/DL Final    Triglyceride 11/10/2021 218* <150 MG/DL Final    Comment: The drugs N-acetylcysteine (NAC) and  Metamiszole have been found to cause falsely  low results in this chemical assay. Please  be sure to submit blood samples obtained  BEFORE administration of either of these  drugs to assure correct results.       HDL Cholesterol 11/10/2021 43  40 - 60 MG/DL Final    LDL, calculated 11/10/2021 138.4* 0 - 100 MG/DL Final    VLDL, calculated 11/10/2021 43.6  MG/DL Final    CHOL/HDL Ratio 11/10/2021 5.2* 0 - 5.0   Final    Sodium 11/10/2021 138  136 - 145 mmol/L Final    Potassium 11/10/2021 4.1  3.5 - 5.5 mmol/L Final    Chloride 11/10/2021 106  100 - 111 mmol/L Final    CO2 11/10/2021 26  21 - 32 mmol/L Final    Anion gap 11/10/2021 6  3.0 - 18 mmol/L Final    Glucose 11/10/2021 104* 74 - 99 mg/dL Final    BUN 11/10/2021 17  7.0 - 18 MG/DL Final    Creatinine 11/10/2021 1.18  0.6 - 1.3 MG/DL Final    BUN/Creatinine ratio 11/10/2021 14  12 - 20   Final    GFR est AA 11/10/2021 >60  >60 ml/min/1.73m2 Final    GFR est non-AA 11/10/2021 >60  >60 ml/min/1.73m2 Final    Comment: (NOTE)  Estimated GFR is calculated using the Modification of Diet in Renal   Disease (MDRD) Study equation, reported for both  Americans   (GFRAA) and non- Americans (GFRNA), and normalized to 1.73m2   body surface area. The physician must decide which value applies to   the patient. The MDRD study equation should only be used in   individuals age 25 or older. It has not been validated for the   following: pregnant women, patients with serious comorbid conditions,   or on certain medications, or persons with extremes of body size,   muscle mass, or nutritional status.  Calcium 11/10/2021 9.2  8.5 - 10.1 MG/DL Final    Bilirubin, total 11/10/2021 0.6  0.2 - 1.0 MG/DL Final    ALT (SGPT) 11/10/2021 31  16 - 61 U/L Final    AST (SGOT) 11/10/2021 24  10 - 38 U/L Final    Alk.  phosphatase 11/10/2021 78  45 - 117 U/L Final    Protein, total 11/10/2021 6.9  6.4 - 8.2 g/dL Final    Albumin 11/10/2021 3.7  3.4 - 5.0 g/dL Final    Globulin 11/10/2021 3.2  2.0 - 4.0 g/dL Final    A-G Ratio 11/10/2021 1.2  0.8 - 1.7   Final    WBC 11/10/2021 7.3  4.6 - 13.2 K/uL Final    RBC 11/10/2021 4.95  4.35 - 5.65 M/uL Final    HGB 11/10/2021 14.9  13.0 - 16.0 g/dL Final    HCT 11/10/2021 44.7  36.0 - 48.0 % Final    MCV 11/10/2021 90.3  78.0 - 100.0 FL Final    MCH 11/10/2021 30.1  24.0 - 34.0 PG Final    MCHC 11/10/2021 33.3  31.0 - 37.0 g/dL Final    RDW 11/10/2021 12.2  11.6 - 14.5 % Final    PLATELET 40/82/8806 289  135 - 420 K/uL Final    MPV 11/10/2021 10.5  9.2 - 11.8 FL Final    NEUTROPHILS 11/10/2021 59  40 - 73 % Final    LYMPHOCYTES 11/10/2021 26  21 - 52 % Final    MONOCYTES 11/10/2021 9  3 - 10 % Final    EOSINOPHILS 11/10/2021 4  0 - 5 % Final    BASOPHILS 11/10/2021 1  0 - 2 % Final    ABS. NEUTROPHILS 11/10/2021 4.4  1.8 - 8.0 K/UL Final    ABS. LYMPHOCYTES 11/10/2021 1.9  0.9 - 3.6 K/UL Final    ABS. MONOCYTES 11/10/2021 0.7  0.05 - 1.2 K/UL Final    ABS. EOSINOPHILS 11/10/2021 0.3  0.0 - 0.4 K/UL Final    ABS. BASOPHILS 11/10/2021 0.1  0.0 - 0.1 K/UL Final    DF 11/10/2021 AUTOMATED    Final   She is currently prescribed amlodipine,    No results found for any visits on 01/27/22. Patient Care Team:  Patient Care Team:  Wilfred Wells NP as PCP - General (Nurse Practitioner)  Wilfred Wells NP as PCP - [facility] Provider      Assessment / Plan:      ICD-10-CM ICD-9-CM    1. Paresthesia of both hands  R20.2 782.0 REFERRAL TO VASCULAR SURGERY   2. Paresthesia of both feet  R20.2 782.0 REFERRAL TO VASCULAR SURGERY   3. Osteoarthritis of both knees, unspecified osteoarthritis type  M17.0 715.96    4. Essential hypertension  I10 401.9 hydrALAZINE (APRESOLINE) 50 mg tablet     Referral to vascular for paresthesia symptoms  Add hydralazine to the treatment plan  Follow-up in 6 weeks    Follow-up and Dispositions    · Return in about 3 months (around 4/27/2022). I asked the patient if he  had any questions and answered his  questions. The patient stated that he understands the treatment plan and agrees with the treatment plan    This document was created with a voice activated dictation system and may contain transcription errors.

## 2022-02-09 ENCOUNTER — OFFICE VISIT (OUTPATIENT)
Dept: VASCULAR SURGERY | Age: 62
End: 2022-02-09
Payer: COMMERCIAL

## 2022-02-09 VITALS
BODY MASS INDEX: 40.53 KG/M2 | DIASTOLIC BLOOD PRESSURE: 90 MMHG | HEART RATE: 77 BPM | HEIGHT: 70 IN | SYSTOLIC BLOOD PRESSURE: 162 MMHG | WEIGHT: 283.07 LBS | OXYGEN SATURATION: 98 %

## 2022-02-09 DIAGNOSIS — I87.2 VENOUS (PERIPHERAL) INSUFFICIENCY: Primary | ICD-10-CM

## 2022-02-09 PROCEDURE — 99203 OFFICE O/P NEW LOW 30 MIN: CPT | Performed by: STUDENT IN AN ORGANIZED HEALTH CARE EDUCATION/TRAINING PROGRAM

## 2022-02-09 NOTE — PROGRESS NOTES
1. Have you been to an emergency room or urgent care clinic since your last visit? No    Hospitalized since your last visit? If yes, where, when, and reason for visit? No    2. Have you seen or consulted any other health care providers outside of the [facility] since your last visit including any procedures, health maintenance items. If yes, where, when and reason for visit?  No

## 2022-02-09 NOTE — PROGRESS NOTES
Meghan Gan  Chief Complaint   Patient presents with   Kan New Patient    Tingling    Leg Pain       History and Physical    Mr Glenn Damon is a 66yo M is here for evaluation of intermittent bilateral hand tingling and paresthesias with intermittent lower extremity pruritis. Patient states that several years ago while he was working in the [employer] he developed bilateral hand cellulitis after which he has had these paresthesias. Review of system was otherwise negative. On my examination patient has strongly palpable radial and ulnar pulses bilaterally, strongly palpable pedal pulses bilaterally, patient has signs of scratches in bilateral lower leg which he states is secondary to itching. No evidence of edema however there is hyperpigmentation of both the ankle suggesting perhaps an underlying venous insufficiency causing the pruritis. 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 Past Medical History:   Diagnosis Date    Arthritis     Hypercholesterolemia     Hypertension      Patient Active Problem List   Diagnosis Code    Sepsis ([facility] [address] A41.9     Past Surgical History:   Procedure Laterality Date    HX ORTHOPAEDIC       Current Outpatient Medications   Medication Sig Dispense Refill    hydrALAZINE (APRESOLINE) 50 mg tablet Take 1 Tablet by mouth every eight (8) hours. 90 Tablet 0    metoprolol tartrate (LOPRESSOR) 100 mg IR tablet Take 1 Tablet by mouth every twelve (12) hours. 60 Tablet 1    amLODIPine (NORVASC) 10 mg tablet Take 1 Tablet by mouth daily. 30 Tablet 1    atorvastatin (LIPITOR) 20 mg tablet Take 1 Tablet by mouth daily. 90 Tablet 1    aspirin delayed-release 81 mg tablet Take 1 Tab by mouth daily.  30 Tab 0     No Known Allergies  Social History     Socioeconomic History    Marital status:      Spouse name: Not on file    Number of children: Not on file    Years of education: Not on file    Highest education level: Not on file   Occupational History    Not on file   Tobacco Use    Smoking status: Former Smoker     Types: Cigarettes     Quit date: 3/26/1998     Years since quittin.8    Smokeless tobacco: Never Used    Tobacco comment: AROUND WIFE WHO SMOKES/2ND HAND SMOKE   Substance and Sexual Activity    Alcohol use: Yes     Alcohol/week: 18.0 standard drinks     Types: 18 Cans of beer per week    Drug use: No    Sexual activity: Not on file   Other Topics Concern    Not on file   Social History Narrative    Not on file     Social Determinants of Health     Financial Resource Strain:     Difficulty of Paying Living Expenses: Not on file   Food Insecurity:     Worried About Running Out of Food in the Last Year: Not on file    Chelsea of Food in the Last Year: Not on file   Transportation Needs:     Lack of Transportation (Medical): Not on file    Lack of Transportation (Non-Medical):  Not on file   Physical Activity:     Days of Exercise per Week: Not on file    Minutes of Exercise per Session: Not on file   Stress:     Feeling of Stress : Not on file   Social Connections:     Frequency of Communication with Friends and Family: Not on file    Frequency of Social Gatherings with Friends and Family: Not on file    Attends Muslim Services: Not on file    Active Member of [address] [place] [address] [employer] or Organizations: Not on file    Attends Club or Organization Meetings: Not on file    Marital Status: Not on file   Intimate Partner Violence:     Fear of Current or Ex-Partner: Not on file    Emotionally Abused: Not on file    Physically Abused: Not on file    Sexually Abused: Not on file   Housing Stability:     Unable to Pay for Housing in the Last Year: Not on file    Number of [place] in the Last Year: Not on file    Unstable Housing in the Last Year: Not on file      Family History   Problem Relation Age of Onset    Diabetes Mother     No Known Problems Father     Diabetes Maternal Grandmother        Review of Systems    General: negative for fever   Eyes: negative for vision loss   HENT: negative for cold symptoms   Respiratory negative for shortness of breath   Cardiac: negative for chest pain   Vascular negative for foot pain at night    Gastrointestinal: negative for abdominal pain   Genitourinary: negative for dysuria    Endocrine: negative for excessive thirst   Skin: negative for rash   Neurological: negative for paralysis   Psychiatric: negative for depression        Physical Exam:    Visit Vitals  BP (!) 162/90 (BP 1 Location: Left upper arm, BP Patient Position: Sitting)   Pulse 77   Ht 5' 10\" (1.778 m)   Wt 283 lb 1.1 oz (128.4 kg)   SpO2 98%   BMI 40.62 kg/m²        Constitutional:  Patient is well developed, well nourished, and not distressed. HEENT: atraumatic, normocephalic, wearing a mask. Eyes:   Cunjunctivae clear, no scleral icterus  Neck:   No JVD present. There is no Carotid bruit. Cardiovascular:  Normal rate, regular rhythm, normal heart sounds. No murmur heard. Pulses:       Right:      Radial         2+    Dorsalis      2+    Post Tib      2+ Left:        Radial         2+    Dorsalis      2+    Post Tib      2+       Pulmonary/Chest: Effort normal and breath sounds normal.  he has no wheezes or no rales. Abdominal:  Bowel sounds are present. Soft. No tenderness to palpation. Extremities: Normal range of motion. No edema. Digits no cyanosis or clubbing  Neurological:  he  is alert and oriented x3 . Gait normal. Motor & sensory grossly intact in all 4 limbs. Psych: Appropriate mood and affect. Skin:  Skin is warm and dry. No rash noted. No erythema. No ulcers. The treatment plan was reviewed with the patient in detail. The patient voiced understanding of this plan and all questions and concerns were addressed. The patient agrees with this plan.   We discussed the signs and symptoms that would require earlier attention or intervention. I appreciate the opportunity to participate in the care of your patient. I will be sure to keep you informed of any subsequent changes in the treatment plan. If you have any questions or concerns, please feel free to contact me.       Babs Dickens MD

## 2022-02-21 DIAGNOSIS — I10 ESSENTIAL HYPERTENSION: ICD-10-CM

## 2022-02-27 RX ORDER — AMLODIPINE BESYLATE 10 MG/1
10 TABLET ORAL DAILY
Qty: 30 TABLET | Refills: 1 | Status: SHIPPED | OUTPATIENT
Start: 2022-02-27

## 2022-03-14 DIAGNOSIS — I10 ESSENTIAL HYPERTENSION: ICD-10-CM

## 2022-03-14 NOTE — TELEPHONE ENCOUNTER
Requested Prescriptions     Pending Prescriptions Disp Refills    hydrALAZINE (APRESOLINE) 50 mg tablet 90 Tablet 0     Sig: Take 1 Tablet by mouth every eight (8) hours.

## 2022-03-18 PROBLEM — A41.9 SEPSIS (HCC): Status: ACTIVE | Noted: 2021-05-05

## 2022-03-18 RX ORDER — HYDRALAZINE HYDROCHLORIDE 50 MG/1
50 TABLET, FILM COATED ORAL EVERY 8 HOURS
Qty: 90 TABLET | Refills: 1 | Status: SHIPPED | OUTPATIENT
Start: 2022-03-18

## 2022-03-18 NOTE — PROGRESS NOTES
Discharge/Transition Planning    Case Management following and chart reviewed. Pt has an insurance that is a high deductible, low cost plan that covers some preventive care and emergency care.  Will not cover home health service      [facility] RN BSN  Outcomes Manager    Pager # [phone] yes

## 2022-03-29 DIAGNOSIS — I10 ESSENTIAL HYPERTENSION: ICD-10-CM

## 2022-03-29 RX ORDER — METOPROLOL TARTRATE 100 MG/1
100 TABLET ORAL EVERY 12 HOURS
Qty: 60 TABLET | Refills: 1 | Status: SHIPPED | OUTPATIENT
Start: 2022-03-29

## 2022-03-29 NOTE — TELEPHONE ENCOUNTER
Requested Prescriptions     Pending Prescriptions Disp Refills    metoprolol tartrate (LOPRESSOR) 100 mg IR tablet 60 Tablet 1     Sig: Take 1 Tablet by mouth every twelve (12) hours.

## 2022-04-06 ENCOUNTER — OFFICE VISIT (OUTPATIENT)
Dept: ORTHOPEDIC SURGERY | Age: 62
End: 2022-04-06
Payer: COMMERCIAL

## 2022-04-06 VITALS
BODY MASS INDEX: 40.04 KG/M2 | WEIGHT: 286 LBS | TEMPERATURE: 97.3 F | OXYGEN SATURATION: 96 % | HEART RATE: 70 BPM | HEIGHT: 71 IN

## 2022-04-06 DIAGNOSIS — M17.0 PRIMARY OSTEOARTHRITIS OF BOTH KNEES: Primary | ICD-10-CM

## 2022-04-06 PROCEDURE — 20611 DRAIN/INJ JOINT/BURSA W/US: CPT | Performed by: PHYSICIAN ASSISTANT

## 2022-04-06 PROCEDURE — 99213 OFFICE O/P EST LOW 20 MIN: CPT | Performed by: PHYSICIAN ASSISTANT

## 2022-04-06 NOTE — PROGRESS NOTES
Chanelle Ivy  1960   Chief Complaint   Patient presents with    Knee Pain     F/U Irving Knees        HISTORY OF PRESENT ILLNESS  Chanelle Ivy is a 62 y.o. male who presents today for reevaluation of b/l knee. Patient rates pain as 10/10 today. Pain has been present for awhile. notes swelling. Has had history of cortisone injections with minimal relief. Has never had viscosupplementation because his insurance did not authorize it. Pain so severe he has lost his job. He notes that he struggles with ADLS. Patient denies any fever, chills, chest pain, shortness of breath or calf pain. The remainder of the review of systems is negative. There are no new illness or injuries to report since last seen in the office. There are no changes to medications, allergies, family or social history. Pain Assessment  4/6/2022   Location of Pain Knee   Location Modifiers Left;Right   Severity of Pain 10   Quality of Pain Other (Comment)   Quality of Pain Comment pressure   Duration of Pain -   Frequency of Pain Constant   Aggravating Factors Standing;Walking;Bending   Aggravating Factors Comment -   Limiting Behavior -   Relieving Factors Rest   Relieving Factors Comment -   Result of Injury No         PHYSICAL EXAM:   Visit Vitals  Pulse 70   Temp 97.3 °F (36.3 °C) (Temporal)   Ht 5' 10.5\" (1.791 m)   Wt 286 lb (129.7 kg)   SpO2 96%   BMI 40.46 kg/m²     The patient is a well-developed, well-nourished male   in no acute distress. The patient is alert and oriented times three. The patient is alert and oriented times three. Mood and affect are normal.  LYMPHATIC: lymph nodes are not enlarged and are within normal limits  SKIN: normal in color and non tender to palpation. There are no bruises or abrasions noted. NEUROLOGICAL: Motor sensory exam is within normal limits. Reflexes are equal bilaterally.  There is normal sensation to pinprick and light touch  MUSCULOSKELETAL:  Examination Left knee Right knee   Skin Intact Intact   Range of motion     Effusion + +   Medial joint line tenderness + +   Lateral joint line tenderness + +   Tenderness Pes Bursa - -   Tenderness insertion MCL - -   Tenderness insertion LCL - -   Jonatans - -   Patella crepitus + +   Patella grind + +   Lachman - -   Pivot shift - -   Anterior drawer - -   Posterior drawer - -   Varus stress - -   Valgus stress - -   Neurovascular Intact Intact   Calf Swelling and Tenderness to Palpation - -   Chyna's Test - -   Hamstring Cord Tightness - -       IMAGING: XR of left knee with 2 views obtained in the office dated 3/18/2021 was reviewed and read by Dr. Dennys Wallace: Marked degenerative arthritis in the medial compartment with varus angulation           XR of right knee with 2 views obtained in the office dated 3/18/2021 was reviewed and read by Dr. Dennys Wallace: Marked degenerative arthritis in the medial compartment with varus angulation    PROCEDURE: Right knee Injection with Ultrasound Guidance    Indication:Right Knee pain/swelling    After sterile prep, 2 cc of Euflexxa were injected into the right Knee. Intra-articular Ultrasound images captured using [address] Ultrasound machine using a frequency of 10 MHz with a linear transducer and scanned into patient's chart.      [facility]  OFFICE PROCEDURE PROGRESS NOTE        Chart reviewed for the following:  Elizabeth WALKER PA, have reviewed the History, Physical and updated the Allergic reactions for [address] performed immediately prior to start of procedure:  Elizabeth WALKER PA-C, have performed the following reviews on Jah Pert prior to the start of the procedure:            * Patient was identified by name and date of birth   * Agreement on procedure being performed was verified  * Risks and Benefits explained to the patient  * Procedure site verified and marked as necessary  * Patient was positioned for comfort  * Consent was signed and verified     Time: 12:05 PM    Date of procedure: 4/7/2022    Procedure performed by:  JOHNATHAN Senior    Provider assisted by: (see medication administration)    How tolerated by patient: tolerated the procedure well with no complications    Comments: none    IMPRESSION:      ICD-10-CM ICD-9-CM    1. Primary osteoarthritis of both knees  M17.0 715.16 ARTHROCENTESIS ASPIR&/INJ MAJOR JT/BURSA W/US        PLAN:   1. Pt presents today with b/l knee pain due to primary OA. Given his failure of relief following cortisone injections we will proceed with Euflexxa injections today. We will proceed with Euflexxa samples today  Risk factors include: BMI>35  2. No ultrasound exam indicated today  3. No cortisone injection indicated today   4. No Physical/Occupational Therapy indicated today  5. No diagnostic test indicated today:   6. No durable medical equipment indicated today  7. No referral indicated today  8. No medications indicated today:   9.  No Narcotic indicated today      RTC 1 week for second injection    MAURICE Senior and Spine Specialist

## 2022-04-13 ENCOUNTER — HOSPITAL ENCOUNTER (OUTPATIENT)
Dept: LAB | Age: 62
Discharge: HOME | End: 2022-04-13

## 2022-04-13 ENCOUNTER — OFFICE VISIT (OUTPATIENT)
Dept: ORTHOPEDIC SURGERY | Age: 62
End: 2022-04-13
Payer: COMMERCIAL

## 2022-04-13 VITALS — TEMPERATURE: 97.1 F | HEART RATE: 71 BPM | OXYGEN SATURATION: 97 %

## 2022-04-13 DIAGNOSIS — M17.11 PRIMARY OSTEOARTHRITIS OF RIGHT KNEE: Primary | ICD-10-CM

## 2022-04-13 LAB
ALBUMIN SERPL-MCNC: 4 G/DL (ref 3.4–5)
ANION GAP SERPL CALC-SCNC: 6 MMOL/L (ref 3–18)
BUN SERPL-MCNC: 19 MG/DL (ref 7–18)
BUN/CREAT SERPL: 13 (ref 12–20)
CALCIUM SERPL-MCNC: 9.4 MG/DL (ref 8.5–10.1)
CHLORIDE SERPL-SCNC: 105 MMOL/L (ref 100–111)
CO2 SERPL-SCNC: 28 MMOL/L (ref 21–32)
CREAT SERPL-MCNC: 1.45 MG/DL (ref 0.6–1.3)
ERYTHROCYTE [DISTWIDTH] IN BLOOD BY AUTOMATED COUNT: 13 % (ref 11.6–14.5)
GLUCOSE SERPL-MCNC: 97 MG/DL (ref 74–99)
HCT VFR BLD AUTO: 43.4 % (ref 36–48)
HGB BLD-MCNC: 14.3 G/DL (ref 13–16)
MCH RBC QN AUTO: 29.5 PG (ref 24–34)
MCHC RBC AUTO-ENTMCNC: 32.9 G/DL (ref 31–37)
MCV RBC AUTO: 89.7 FL (ref 78–100)
NRBC # BLD: 0 K/UL (ref 0–0.01)
NRBC BLD-RTO: 0 PER 100 WBC
PHOSPHATE SERPL-MCNC: 3.6 MG/DL (ref 2.5–4.9)
PLATELET # BLD AUTO: 298 K/UL (ref 135–420)
PMV BLD AUTO: 10.2 FL (ref 9.2–11.8)
POTASSIUM SERPL-SCNC: 4.2 MMOL/L (ref 3.5–5.5)
RBC # BLD AUTO: 4.84 M/UL (ref 4.35–5.65)
SODIUM SERPL-SCNC: 139 MMOL/L (ref 136–145)
WBC # BLD AUTO: 10.7 K/UL (ref 4.6–13.2)

## 2022-04-13 PROCEDURE — 80069 RENAL FUNCTION PANEL: CPT

## 2022-04-13 PROCEDURE — 85027 COMPLETE CBC AUTOMATED: CPT

## 2022-04-13 PROCEDURE — 36415 COLL VENOUS BLD VENIPUNCTURE: CPT

## 2022-04-13 PROCEDURE — 20611 DRAIN/INJ JOINT/BURSA W/US: CPT | Performed by: PHYSICIAN ASSISTANT

## 2022-04-13 NOTE — PROGRESS NOTES
Patient: Marie Hollingsworth                MRN: [id number]       SSN: [id number]  YOB: 1960        AGE: 62 y.o. SEX: male  There is no height or weight on file to calculate BMI. PCP: Belinda Morocho NP  04/13/22    Chief Complaint   Patient presents with    Knee Pain     rt       HISTORY:  Marie Hollingsworth is a 62 y.o. male who is seen for reevaluation of Right knee here for 2nd injection of euflexxa samples. PROCEDURE:  Right knee Injection with Ultrasound Guidance    Indication:Right Knee pain/swelling    After sterile prep, 2 cc of Euflexxa samples were injected into the right Knee. Intra-articular Ultrasound images captured using [address] [place] [address] Loop Ultrasound machine using a frequency of 10 MHz with a linear transducer and scanned into patient's chart. [facility]  OFFICE PROCEDURE PROGRESS NOTE        Chart reviewed for the following:   Tessa WALKER PA-C, have reviewed the History, Physical and updated the Allergic reactions for [address] performed immediately prior to start of procedure:   Tessa WALKER PA-C, have performed the following reviews on Marie Hollingsworth prior to the start of the procedure:            * Patient was identified by name and date of birth   * Agreement on procedure being performed was verified  * Risks and Benefits explained to the patient  * Procedure site verified and marked as necessary  * Patient was positioned for comfort  * Consent was signed and verified     Time: 3:43 PM    Date of procedure: 4/13/2022    Procedure performed by:  JOHNATHAN López    Provider assisted by: None     How tolerated by patient: tolerated the procedure well with no complications    Comments: none    IMPRESSION:     ICD-10-CM ICD-9-CM    1. Primary osteoarthritis of right knee  M17.11 715.16         PLAN:  Mr. Hellen Antonio will return in one week for his third Euflexxa injection.       Tessa Sorto MAURICE [facility]

## 2022-08-09 DIAGNOSIS — I87.2 VENOUS (PERIPHERAL) INSUFFICIENCY: ICD-10-CM

## 2023-10-13 NOTE — PROGRESS NOTES
CM faxed clinicals with progress note requesting a rheumatology appt to Dr. Peyton Diane's office at [phone]. CM noted on fax cover sheet requesting office visit by Dr. Chandni Wright within 7-10 days.          Eldon Topete RN  Case Management [phone]

## 2023-11-07 ENCOUNTER — APPOINTMENT (OUTPATIENT)
Facility: HOSPITAL | Age: 63
End: 2023-11-07
Payer: MEDICAID

## 2023-11-07 ENCOUNTER — HOSPITAL ENCOUNTER (EMERGENCY)
Facility: HOSPITAL | Age: 63
Discharge: HOME | End: 2023-11-07
Payer: MEDICAID

## 2023-11-07 VITALS
HEART RATE: 82 BPM | OXYGEN SATURATION: 99 % | SYSTOLIC BLOOD PRESSURE: 136 MMHG | BODY MASS INDEX: 27.58 KG/M2 | TEMPERATURE: 98.2 F | RESPIRATION RATE: 18 BRPM | WEIGHT: 197 LBS | HEIGHT: 71 IN | DIASTOLIC BLOOD PRESSURE: 76 MMHG

## 2023-11-07 DIAGNOSIS — L03.032 ABSCESS OR CELLULITIS OF TOE, LEFT: ICD-10-CM

## 2023-11-07 DIAGNOSIS — M79.675 PAIN OF TOE OF LEFT FOOT: Primary | ICD-10-CM

## 2023-11-07 DIAGNOSIS — L02.612 ABSCESS OR CELLULITIS OF TOE, LEFT: ICD-10-CM

## 2023-11-07 LAB
ALBUMIN SERPL-MCNC: 3.6 G/DL (ref 3.4–5)
ALBUMIN/GLOB SERPL: 1 (ref 0.8–1.7)
ALP SERPL-CCNC: 77 U/L (ref 45–117)
ALT SERPL-CCNC: 24 U/L (ref 16–61)
ANION GAP SERPL CALC-SCNC: 7 MMOL/L (ref 3–18)
AST SERPL-CCNC: 17 U/L (ref 10–38)
BASOPHILS # BLD: 0.1 K/UL (ref 0–0.1)
BASOPHILS NFR BLD: 0 % (ref 0–2)
BILIRUB SERPL-MCNC: 0.5 MG/DL (ref 0.2–1)
BUN SERPL-MCNC: 21 MG/DL (ref 7–18)
BUN/CREAT SERPL: 17 (ref 12–20)
CALCIUM SERPL-MCNC: 9 MG/DL (ref 8.5–10.1)
CHLORIDE SERPL-SCNC: 105 MMOL/L (ref 100–111)
CO2 SERPL-SCNC: 25 MMOL/L (ref 21–32)
CREAT SERPL-MCNC: 1.23 MG/DL (ref 0.6–1.3)
DIFFERENTIAL METHOD BLD: ABNORMAL
EOSINOPHIL # BLD: 0.3 K/UL (ref 0–0.4)
EOSINOPHIL NFR BLD: 2 % (ref 0–5)
ERYTHROCYTE [DISTWIDTH] IN BLOOD BY AUTOMATED COUNT: 13.2 % (ref 11.6–14.5)
GLOBULIN SER CALC-MCNC: 3.7 G/DL (ref 2–4)
GLUCOSE SERPL-MCNC: 94 MG/DL (ref 74–99)
HCT VFR BLD AUTO: 40.9 % (ref 36–48)
HGB BLD-MCNC: 13.8 G/DL (ref 13–16)
IMM GRANULOCYTES # BLD AUTO: 0 K/UL (ref 0–0.04)
IMM GRANULOCYTES NFR BLD AUTO: 0 % (ref 0–0.5)
LYMPHOCYTES # BLD: 2 K/UL (ref 0.9–3.6)
LYMPHOCYTES NFR BLD: 16 % (ref 21–52)
MCH RBC QN AUTO: 30.1 PG (ref 24–34)
MCHC RBC AUTO-ENTMCNC: 33.7 G/DL (ref 31–37)
MCV RBC AUTO: 89.3 FL (ref 78–100)
MONOCYTES # BLD: 0.9 K/UL (ref 0.05–1.2)
MONOCYTES NFR BLD: 7 % (ref 3–10)
NEUTS SEG # BLD: 9.2 K/UL (ref 1.8–8)
NEUTS SEG NFR BLD: 74 % (ref 40–73)
NRBC # BLD: 0 K/UL (ref 0–0.01)
NRBC BLD-RTO: 0 PER 100 WBC
PLATELET # BLD AUTO: 270 K/UL (ref 135–420)
PMV BLD AUTO: 10 FL (ref 9.2–11.8)
POTASSIUM SERPL-SCNC: 4 MMOL/L (ref 3.5–5.5)
PROT SERPL-MCNC: 7.3 G/DL (ref 6.4–8.2)
RBC # BLD AUTO: 4.58 M/UL (ref 4.35–5.65)
SODIUM SERPL-SCNC: 137 MMOL/L (ref 136–145)
WBC # BLD AUTO: 12.4 K/UL (ref 4.6–13.2)

## 2023-11-07 PROCEDURE — 99284 EMERGENCY DEPT VISIT MOD MDM: CPT

## 2023-11-07 PROCEDURE — 73630 X-RAY EXAM OF FOOT: CPT

## 2023-11-07 PROCEDURE — 80053 COMPREHEN METABOLIC PANEL: CPT

## 2023-11-07 PROCEDURE — 85025 COMPLETE CBC W/AUTO DIFF WBC: CPT

## 2023-11-07 PROCEDURE — 10060 I&D ABSCESS SIMPLE/SINGLE: CPT

## 2023-11-07 PROCEDURE — 2500000003 HC RX 250 WO HCPCS

## 2023-11-07 PROCEDURE — 11730 AVULSION NAIL PLATE SIMPLE 1: CPT

## 2023-11-07 RX ORDER — LIDOCAINE HYDROCHLORIDE 10 MG/ML
5 INJECTION, SOLUTION EPIDURAL; INFILTRATION; INTRACAUDAL; PERINEURAL
Status: COMPLETED | OUTPATIENT
Start: 2023-11-07 | End: 2023-11-07

## 2023-11-07 RX ORDER — DOXYCYCLINE HYCLATE 100 MG
100 TABLET ORAL 2 TIMES DAILY
Qty: 14 TABLET | Refills: 0 | Status: SHIPPED | OUTPATIENT
Start: 2023-11-07 | End: 2023-11-07 | Stop reason: ALTCHOICE

## 2023-11-07 RX ADMIN — LIDOCAINE HYDROCHLORIDE 5 ML: 10 INJECTION, SOLUTION EPIDURAL; INFILTRATION; INTRACAUDAL; PERINEURAL at 19:09

## 2023-11-07 ASSESSMENT — PAIN SCALES - GENERAL: PAINLEVEL_OUTOF10: 8

## 2023-11-07 ASSESSMENT — PAIN DESCRIPTION - LOCATION: LOCATION: FOOT

## 2023-11-07 ASSESSMENT — ENCOUNTER SYMPTOMS
VOMITING: 0
DIARRHEA: 0
ABDOMINAL DISTENTION: 0
NAUSEA: 0
RHINORRHEA: 0
ABDOMINAL PAIN: 0

## 2023-11-07 ASSESSMENT — LIFESTYLE VARIABLES
HOW OFTEN DO YOU HAVE A DRINK CONTAINING ALCOHOL: 4 OR MORE TIMES A WEEK
HOW MANY STANDARD DRINKS CONTAINING ALCOHOL DO YOU HAVE ON A TYPICAL DAY: 3 OR 4

## 2023-11-07 ASSESSMENT — PAIN - FUNCTIONAL ASSESSMENT: PAIN_FUNCTIONAL_ASSESSMENT: 0-10

## 2023-11-07 ASSESSMENT — PAIN DESCRIPTION - FREQUENCY: FREQUENCY: CONTINUOUS

## 2023-11-07 NOTE — ED PROVIDER NOTES
encounter. Current Outpatient Medications   Medication Sig Dispense Refill    amLODIPine (NORVASC) 10 MG tablet Take 10 mg by mouth daily (Patient not taking: Reported on 11/7/2023)      aspirin 81 MG EC tablet Take 81 mg by mouth daily (Patient not taking: Reported on 11/7/2023)      atorvastatin (LIPITOR) 20 MG tablet Take 20 mg by mouth daily (Patient not taking: Reported on 11/7/2023)      hydrALAZINE (APRESOLINE) 50 MG tablet Take 50 mg by mouth in the morning and 50 mg at noon and 50 mg in the evening. (Patient not taking: Reported on 11/7/2023)      metoprolol (LOPRESSOR) 100 MG tablet Take 100 mg by mouth in the morning and 100 mg in the evening. (Patient not taking: Reported on 11/7/2023)         Allergies:  No Known Allergies    Social Determinants of Health:  Social Determinants of Health     Tobacco Use: Medium Risk (4/13/2022)    Patient History     Smoking Tobacco Use: Former     Smokeless Tobacco Use: Never     Passive Exposure: Not on file   Alcohol Use: Heavy Drinker (11/7/2023)    AUDIT-C     Frequency of Alcohol Consumption: 4 or more times a week     Average Number of Drinks: 3 or 4     Frequency of Binge Drinking: Less than monthly   Financial Resource Strain: Not on file   Food Insecurity: Not on file   Transportation Needs: Not on file   Physical Activity: Not on file   Stress: Not on file   Social Connections: Not on file   Intimate Partner Violence: Not on file   Depression: Not on file   Housing Stability: Not on file   Interpersonal Safety: Not on file   Utilities: Not on file       Review of Systems   Review of Systems   Constitutional:  Negative for activity change, chills and fever. HENT:  Negative for congestion and rhinorrhea. Cardiovascular:  Negative for chest pain and palpitations. Gastrointestinal:  Negative for abdominal distention, abdominal pain, diarrhea, nausea and vomiting. Genitourinary:  Negative for dysuria. Musculoskeletal:  Positive for arthralgias.

## 2023-11-07 NOTE — ED TRIAGE NOTES
Patient presents to ED with complaints of L Toe middle pain. Unsure how injury occurred - perhaps something fell on it. Happened sometime last couple weeks.

## 2023-11-08 NOTE — CONSULTS
Podiatry History and Physical      Patient: Ross Neville               Sex: male          DOA: [unfilled]       YOB: 1960      Age:  59 y.o.        LOS:  LOS: 0 days     Subjective:         Date of Consultation:  2023    Referring Physician: ANNIKA Cardoza    Patient is a 59 y.o. male who is being seen for left second toe trauma. Patient states that his toe is gradually getting worse over last 2 weeks. He doesn't recall any particular incident leading to injury. Patient didn't seek medical attention until today. His mother recommended to come to ER for evaluation. He was intoxicated prior to arrival. Patient reports severe pain to left second toe and his toenail is partially lifting up. Denies noticing purulent discharge. Denies being diabetic. Denies N/V/C/F. Patient Active Problem List    Diagnosis Date Noted    Sepsis ([address]) 2021     Past Medical History:   Diagnosis Date    Arthritis     Cellulitis 2021    legs    Hypercholesterolemia     Hypertension       Family History   Problem Relation Age of Onset    Diabetes Maternal Grandmother     No Known Problems Father     Diabetes Mother       Social History     Tobacco Use    Smoking status: Former     Types: Cigarettes     Quit date: 3/26/1998     Years since quittin.6    Smokeless tobacco: Never    Tobacco comments:     Quit smoking: AROUND WIFE WHO SMOKES/2ND HAND SMOKE   Substance Use Topics    Alcohol use: Yes     Alcohol/week: 18.0 standard drinks of alcohol     Past Surgical History:   Procedure Laterality Date    ORTHOPEDIC SURGERY        Prior to Admission medications    Medication Sig Start Date End Date Taking?  Authorizing Provider   doxycycline hyclate (VIBRA-TABS) 100 MG tablet Take 1 tablet by mouth 2 times daily for 7 days 23 Yes Marta Membreno PA-C   amLODIPine (NORVASC) 10 MG tablet Take 10 mg by mouth daily  Patient not taking: Reported on 2023   [employer]

## 2023-11-18 ENCOUNTER — APPOINTMENT (OUTPATIENT)
Facility: HOSPITAL | Age: 63
DRG: 253 | End: 2023-11-18
Payer: MEDICARE

## 2023-11-18 ENCOUNTER — HOSPITAL ENCOUNTER (INPATIENT)
Facility: HOSPITAL | Age: 63
LOS: 4 days | Discharge: HOME HEALTH | DRG: 253 | End: 2023-11-23
Attending: EMERGENCY MEDICINE | Admitting: INTERNAL MEDICINE
Payer: MEDICARE

## 2023-11-18 DIAGNOSIS — L08.9 TOE INFECTION: ICD-10-CM

## 2023-11-18 DIAGNOSIS — I96 GANGRENE (HCC): Primary | ICD-10-CM

## 2023-11-18 DIAGNOSIS — I96 GANGRENE OF TOE OF LEFT FOOT (HCC): ICD-10-CM

## 2023-11-18 DIAGNOSIS — I87.2 PERIPHERAL VENOUS INSUFFICIENCY: ICD-10-CM

## 2023-11-18 DIAGNOSIS — F10.24 ALCOHOL DEPENDENCE WITH ALCOHOL-INDUCED MOOD DISORDER (HCC): ICD-10-CM

## 2023-11-18 LAB
ALBUMIN SERPL-MCNC: 3.4 G/DL (ref 3.4–5)
ALBUMIN/GLOB SERPL: 0.8 (ref 0.8–1.7)
ALP SERPL-CCNC: 74 U/L (ref 45–117)
ALT SERPL-CCNC: 21 U/L (ref 16–61)
AMPHET UR QL SCN: NEGATIVE
ANION GAP SERPL CALC-SCNC: 9 MMOL/L (ref 3–18)
APPEARANCE UR: CLEAR
AST SERPL-CCNC: 13 U/L (ref 10–38)
BARBITURATES UR QL SCN: NEGATIVE
BASOPHILS # BLD: 0.1 K/UL (ref 0–0.1)
BASOPHILS NFR BLD: 0 % (ref 0–2)
BENZODIAZ UR QL: NEGATIVE
BILIRUB SERPL-MCNC: 0.5 MG/DL (ref 0.2–1)
BILIRUB UR QL: NEGATIVE
BUN SERPL-MCNC: 21 MG/DL (ref 7–18)
BUN/CREAT SERPL: 20 (ref 12–20)
CALCIUM SERPL-MCNC: 8.7 MG/DL (ref 8.5–10.1)
CANNABINOIDS UR QL SCN: POSITIVE
CHLORIDE SERPL-SCNC: 102 MMOL/L (ref 100–111)
CO2 SERPL-SCNC: 25 MMOL/L (ref 21–32)
COCAINE UR QL SCN: NEGATIVE
COLOR UR: YELLOW
CREAT SERPL-MCNC: 1.04 MG/DL (ref 0.6–1.3)
DIFFERENTIAL METHOD BLD: ABNORMAL
EOSINOPHIL # BLD: 0.3 K/UL (ref 0–0.4)
EOSINOPHIL NFR BLD: 2 % (ref 0–5)
ERYTHROCYTE [DISTWIDTH] IN BLOOD BY AUTOMATED COUNT: 12.9 % (ref 11.6–14.5)
ETHANOL SERPL-MCNC: 168 MG/DL (ref 0–3)
GLOBULIN SER CALC-MCNC: 4.2 G/DL (ref 2–4)
GLUCOSE SERPL-MCNC: 102 MG/DL (ref 74–99)
GLUCOSE UR STRIP.AUTO-MCNC: NEGATIVE MG/DL
HCT VFR BLD AUTO: 40.2 % (ref 36–48)
HGB BLD-MCNC: 13.8 G/DL (ref 13–16)
HGB UR QL STRIP: NEGATIVE
IMM GRANULOCYTES # BLD AUTO: 0.1 K/UL (ref 0–0.04)
IMM GRANULOCYTES NFR BLD AUTO: 1 % (ref 0–0.5)
KETONES UR QL STRIP.AUTO: NEGATIVE MG/DL
LEUKOCYTE ESTERASE UR QL STRIP.AUTO: NEGATIVE
LYMPHOCYTES # BLD: 2.3 K/UL (ref 0.9–3.6)
LYMPHOCYTES NFR BLD: 16 % (ref 21–52)
Lab: ABNORMAL
MCH RBC QN AUTO: 30.1 PG (ref 24–34)
MCHC RBC AUTO-ENTMCNC: 34.3 G/DL (ref 31–37)
MCV RBC AUTO: 87.6 FL (ref 78–100)
METHADONE UR QL: NEGATIVE
MONOCYTES # BLD: 1 K/UL (ref 0.05–1.2)
MONOCYTES NFR BLD: 7 % (ref 3–10)
NEUTS SEG # BLD: 10.1 K/UL (ref 1.8–8)
NEUTS SEG NFR BLD: 73 % (ref 40–73)
NITRITE UR QL STRIP.AUTO: NEGATIVE
NRBC # BLD: 0 K/UL (ref 0–0.01)
NRBC BLD-RTO: 0 PER 100 WBC
OPIATES UR QL: NEGATIVE
PCP UR QL: NEGATIVE
PH UR STRIP: 6 (ref 5–8)
PLATELET # BLD AUTO: 322 K/UL (ref 135–420)
PMV BLD AUTO: 9.7 FL (ref 9.2–11.8)
POTASSIUM SERPL-SCNC: 3.3 MMOL/L (ref 3.5–5.5)
PROT SERPL-MCNC: 7.6 G/DL (ref 6.4–8.2)
PROT UR STRIP-MCNC: NEGATIVE MG/DL
RBC # BLD AUTO: 4.59 M/UL (ref 4.35–5.65)
SODIUM SERPL-SCNC: 136 MMOL/L (ref 136–145)
SP GR UR REFRACTOMETRY: 1.01 (ref 1–1.03)
UROBILINOGEN UR QL STRIP.AUTO: 0.2 EU/DL (ref 0.2–1)
WBC # BLD AUTO: 13.8 K/UL (ref 4.6–13.2)

## 2023-11-18 PROCEDURE — 99285 EMERGENCY DEPT VISIT HI MDM: CPT

## 2023-11-18 PROCEDURE — 85025 COMPLETE CBC W/AUTO DIFF WBC: CPT

## 2023-11-18 PROCEDURE — 2500000003 HC RX 250 WO HCPCS: Performed by: EMERGENCY MEDICINE

## 2023-11-18 PROCEDURE — 93005 ELECTROCARDIOGRAM TRACING: CPT | Performed by: EMERGENCY MEDICINE

## 2023-11-18 PROCEDURE — 82077 ASSAY SPEC XCP UR&BREATH IA: CPT

## 2023-11-18 PROCEDURE — 80307 DRUG TEST PRSMV CHEM ANLYZR: CPT

## 2023-11-18 PROCEDURE — 80053 COMPREHEN METABOLIC PANEL: CPT

## 2023-11-18 PROCEDURE — 73660 X-RAY EXAM OF TOE(S): CPT

## 2023-11-18 PROCEDURE — 6360000002 HC RX W HCPCS: Performed by: EMERGENCY MEDICINE

## 2023-11-18 PROCEDURE — 96374 THER/PROPH/DIAG INJ IV PUSH: CPT

## 2023-11-18 PROCEDURE — 81003 URINALYSIS AUTO W/O SCOPE: CPT

## 2023-11-18 PROCEDURE — 71045 X-RAY EXAM CHEST 1 VIEW: CPT

## 2023-11-18 PROCEDURE — 2580000003 HC RX 258: Performed by: EMERGENCY MEDICINE

## 2023-11-18 PROCEDURE — 6370000000 HC RX 637 (ALT 250 FOR IP): Performed by: EMERGENCY MEDICINE

## 2023-11-18 RX ADMIN — THIAMINE HYDROCHLORIDE: 100 INJECTION, SOLUTION INTRAMUSCULAR; INTRAVENOUS at 21:29

## 2023-11-18 RX ADMIN — POTASSIUM BICARBONATE 40 MEQ: 782 TABLET, EFFERVESCENT ORAL at 23:54

## 2023-11-18 ASSESSMENT — PAIN DESCRIPTION - FREQUENCY: FREQUENCY: CONTINUOUS

## 2023-11-18 ASSESSMENT — PAIN SCALES - GENERAL: PAINLEVEL_OUTOF10: 7

## 2023-11-18 ASSESSMENT — ENCOUNTER SYMPTOMS
GASTROINTESTINAL NEGATIVE: 1
EYES NEGATIVE: 1
RESPIRATORY NEGATIVE: 1

## 2023-11-18 ASSESSMENT — PAIN DESCRIPTION - LOCATION: LOCATION: TOE (COMMENT WHICH ONE)

## 2023-11-18 ASSESSMENT — PAIN DESCRIPTION - ORIENTATION: ORIENTATION: LEFT

## 2023-11-18 ASSESSMENT — PAIN - FUNCTIONAL ASSESSMENT: PAIN_FUNCTIONAL_ASSESSMENT: 0-10

## 2023-11-18 ASSESSMENT — PAIN DESCRIPTION - PAIN TYPE: TYPE: CHRONIC PAIN

## 2023-11-19 PROBLEM — I96 GANGRENE OF TOE OF RIGHT FOOT (HCC): Status: ACTIVE | Noted: 2023-11-19

## 2023-11-19 PROBLEM — I96 GANGRENE (HCC): Status: ACTIVE | Noted: 2023-11-19

## 2023-11-19 LAB
EKG ATRIAL RATE: 81 BPM
EKG DIAGNOSIS: NORMAL
EKG P AXIS: 0 DEGREES
EKG P-R INTERVAL: 174 MS
EKG Q-T INTERVAL: 398 MS
EKG QRS DURATION: 102 MS
EKG QTC CALCULATION (BAZETT): 462 MS
EKG R AXIS: 4 DEGREES
EKG T AXIS: 15 DEGREES
EKG VENTRICULAR RATE: 81 BPM

## 2023-11-19 PROCEDURE — 93010 ELECTROCARDIOGRAM REPORT: CPT | Performed by: INTERNAL MEDICINE

## 2023-11-19 PROCEDURE — 99222 1ST HOSP IP/OBS MODERATE 55: CPT | Performed by: INTERNAL MEDICINE

## 2023-11-19 PROCEDURE — 6360000002 HC RX W HCPCS: Performed by: INTERNAL MEDICINE

## 2023-11-19 PROCEDURE — 6360000002 HC RX W HCPCS

## 2023-11-19 PROCEDURE — 6370000000 HC RX 637 (ALT 250 FOR IP): Performed by: INTERNAL MEDICINE

## 2023-11-19 PROCEDURE — 1100000000 HC RM PRIVATE

## 2023-11-19 PROCEDURE — 6360000002 HC RX W HCPCS: Performed by: EMERGENCY MEDICINE

## 2023-11-19 PROCEDURE — 2580000003 HC RX 258: Performed by: INTERNAL MEDICINE

## 2023-11-19 PROCEDURE — 2580000003 HC RX 258: Performed by: EMERGENCY MEDICINE

## 2023-11-19 RX ORDER — ENOXAPARIN SODIUM 100 MG/ML
40 INJECTION SUBCUTANEOUS DAILY
Status: DISCONTINUED | OUTPATIENT
Start: 2023-11-19 | End: 2023-11-23 | Stop reason: HOSPADM

## 2023-11-19 RX ORDER — POTASSIUM CHLORIDE 20 MEQ/1
40 TABLET, EXTENDED RELEASE ORAL PRN
Status: DISCONTINUED | OUTPATIENT
Start: 2023-11-19 | End: 2023-11-23 | Stop reason: HOSPADM

## 2023-11-19 RX ORDER — ACETAMINOPHEN 650 MG/1
650 SUPPOSITORY RECTAL EVERY 6 HOURS PRN
Status: DISCONTINUED | OUTPATIENT
Start: 2023-11-19 | End: 2023-11-23 | Stop reason: HOSPADM

## 2023-11-19 RX ORDER — TRAMADOL HYDROCHLORIDE 50 MG/1
50 TABLET ORAL EVERY 6 HOURS PRN
Status: DISCONTINUED | OUTPATIENT
Start: 2023-11-19 | End: 2023-11-23 | Stop reason: HOSPADM

## 2023-11-19 RX ORDER — PIPERACILLIN SODIUM, TAZOBACTAM SODIUM 4; .5 G/20ML; G/20ML
INJECTION, POWDER, LYOPHILIZED, FOR SOLUTION INTRAVENOUS
Status: COMPLETED
Start: 2023-11-19 | End: 2023-11-19

## 2023-11-19 RX ORDER — SODIUM CHLORIDE 0.9 % (FLUSH) 0.9 %
5-40 SYRINGE (ML) INJECTION EVERY 12 HOURS SCHEDULED
Status: DISCONTINUED | OUTPATIENT
Start: 2023-11-19 | End: 2023-11-23 | Stop reason: HOSPADM

## 2023-11-19 RX ORDER — MAGNESIUM SULFATE IN WATER 40 MG/ML
2000 INJECTION, SOLUTION INTRAVENOUS PRN
Status: DISCONTINUED | OUTPATIENT
Start: 2023-11-19 | End: 2023-11-23 | Stop reason: HOSPADM

## 2023-11-19 RX ORDER — ONDANSETRON 2 MG/ML
4 INJECTION INTRAMUSCULAR; INTRAVENOUS EVERY 6 HOURS PRN
Status: DISCONTINUED | OUTPATIENT
Start: 2023-11-19 | End: 2023-11-23 | Stop reason: HOSPADM

## 2023-11-19 RX ORDER — SODIUM CHLORIDE 0.9 % (FLUSH) 0.9 %
5-40 SYRINGE (ML) INJECTION PRN
Status: DISCONTINUED | OUTPATIENT
Start: 2023-11-19 | End: 2023-11-23 | Stop reason: HOSPADM

## 2023-11-19 RX ORDER — ACETAMINOPHEN 325 MG/1
650 TABLET ORAL EVERY 6 HOURS PRN
Status: DISCONTINUED | OUTPATIENT
Start: 2023-11-19 | End: 2023-11-23 | Stop reason: HOSPADM

## 2023-11-19 RX ORDER — SODIUM CHLORIDE 9 MG/ML
INJECTION, SOLUTION INTRAVENOUS PRN
Status: DISCONTINUED | OUTPATIENT
Start: 2023-11-19 | End: 2023-11-23 | Stop reason: HOSPADM

## 2023-11-19 RX ORDER — ONDANSETRON 4 MG/1
4 TABLET, ORALLY DISINTEGRATING ORAL EVERY 8 HOURS PRN
Status: DISCONTINUED | OUTPATIENT
Start: 2023-11-19 | End: 2023-11-23 | Stop reason: HOSPADM

## 2023-11-19 RX ORDER — SODIUM CHLORIDE 9 MG/ML
INJECTION, SOLUTION INTRAVENOUS
Status: DISPENSED
Start: 2023-11-19 | End: 2023-11-19

## 2023-11-19 RX ORDER — POTASSIUM CHLORIDE 7.45 MG/ML
10 INJECTION INTRAVENOUS PRN
Status: DISCONTINUED | OUTPATIENT
Start: 2023-11-19 | End: 2023-11-23 | Stop reason: HOSPADM

## 2023-11-19 RX ORDER — VANCOMYCIN HYDROCHLORIDE 1 G/20ML
INJECTION, POWDER, LYOPHILIZED, FOR SOLUTION INTRAVENOUS
Status: DISPENSED
Start: 2023-11-19 | End: 2023-11-19

## 2023-11-19 RX ORDER — POLYETHYLENE GLYCOL 3350 17 G/17G
17 POWDER, FOR SOLUTION ORAL DAILY PRN
Status: DISCONTINUED | OUTPATIENT
Start: 2023-11-19 | End: 2023-11-23 | Stop reason: HOSPADM

## 2023-11-19 RX ADMIN — PIPERACILLIN AND TAZOBACTAM 4500 MG: 4; .5 INJECTION, POWDER, LYOPHILIZED, FOR SOLUTION INTRAVENOUS at 01:50

## 2023-11-19 RX ADMIN — VANCOMYCIN HYDROCHLORIDE 1000 MG: 1 INJECTION, POWDER, LYOPHILIZED, FOR SOLUTION INTRAVENOUS at 02:35

## 2023-11-19 RX ADMIN — TRAMADOL HYDROCHLORIDE 50 MG: 50 TABLET ORAL at 06:28

## 2023-11-19 RX ADMIN — SODIUM CHLORIDE, PRESERVATIVE FREE 10 ML: 5 INJECTION INTRAVENOUS at 21:08

## 2023-11-19 RX ADMIN — ACETAMINOPHEN 325MG 650 MG: 325 TABLET ORAL at 21:04

## 2023-11-19 RX ADMIN — VANCOMYCIN HYDROCHLORIDE 1000 MG: 1 INJECTION, POWDER, LYOPHILIZED, FOR SOLUTION INTRAVENOUS at 18:45

## 2023-11-19 RX ADMIN — PIPERACILLIN AND TAZOBACTAM: 4; .5 INJECTION, POWDER, FOR SOLUTION INTRAVENOUS; PARENTERAL at 01:30

## 2023-11-19 RX ADMIN — TRAMADOL HYDROCHLORIDE 50 MG: 50 TABLET ORAL at 13:00

## 2023-11-19 RX ADMIN — VANCOMYCIN HYDROCHLORIDE 1000 MG: 1 INJECTION, POWDER, LYOPHILIZED, FOR SOLUTION INTRAVENOUS at 06:59

## 2023-11-19 RX ADMIN — PIPERACILLIN AND TAZOBACTAM 3375 MG: 3; .375 INJECTION, POWDER, LYOPHILIZED, FOR SOLUTION INTRAVENOUS at 16:19

## 2023-11-19 RX ADMIN — TRAMADOL HYDROCHLORIDE 50 MG: 50 TABLET ORAL at 18:45

## 2023-11-19 RX ADMIN — PIPERACILLIN AND TAZOBACTAM 3375 MG: 3; .375 INJECTION, POWDER, LYOPHILIZED, FOR SOLUTION INTRAVENOUS at 08:53

## 2023-11-19 ASSESSMENT — PAIN DESCRIPTION - LOCATION
LOCATION: TOE (COMMENT WHICH ONE)
LOCATION: FOOT

## 2023-11-19 ASSESSMENT — PAIN SCALES - GENERAL
PAINLEVEL_OUTOF10: 0
PAINLEVEL_OUTOF10: 8
PAINLEVEL_OUTOF10: 8
PAINLEVEL_OUTOF10: 6
PAINLEVEL_OUTOF10: 4
PAINLEVEL_OUTOF10: 2
PAINLEVEL_OUTOF10: 10

## 2023-11-19 ASSESSMENT — PAIN DESCRIPTION - DESCRIPTORS
DESCRIPTORS: ACHING;THROBBING;SHOOTING
DESCRIPTORS: ACHING
DESCRIPTORS: TIGHTNESS
DESCRIPTORS: DULL;ACHING
DESCRIPTORS: SQUEEZING;ACHING

## 2023-11-19 ASSESSMENT — PAIN DESCRIPTION - FREQUENCY: FREQUENCY: CONTINUOUS

## 2023-11-19 ASSESSMENT — PAIN DESCRIPTION - ORIENTATION
ORIENTATION: LEFT
ORIENTATION: RIGHT
ORIENTATION: LEFT

## 2023-11-19 ASSESSMENT — PAIN - FUNCTIONAL ASSESSMENT: PAIN_FUNCTIONAL_ASSESSMENT: ACTIVITIES ARE NOT PREVENTED

## 2023-11-19 NOTE — ED PROVIDER NOTES
patient. Initial assessment performed. I reviewed the vital signs, available nursing notes, past medical history, past surgical history, family history and social history. The patients presenting problems have been discussed, and they are in agreement with the care plan formulated and outlined with them. I have encouraged the patient to ask questions as they arise throughout their visit. Medical Decision Making     Total CriticalCare time was 65 minutes, excluding separately reportable procedures. There was a high probability of clinically significant/life threatening deterioration in the patient's condition which may required my urgent intervention. EKG: interpreted by me    Start (7:50)  End (9:30)    Differential diagnosis: Impending Dts  Gangrene of toe, sepsis, chronic alcoholism, dehydration,      Diagnosis and Disposition     DIAGNOSIS: Gangrene of toe, chronic alcoholism, dehydration    Consultation: Case discussed with hospitalist Dr. Maura Tejeda. She accepted patient to her service for admission. Consultation: Boubacar Young. He  will be to the consultant on this case. Dragon Disclaimer     Please note that this dictation was completed with [email or web address], the computer voice recognition software. Quite often unanticipated grammatical, syntax, homophones, and other interpretive errors are inadvertently transcribed by the computer software. Please disregard these errors. Please excuse any errors that have escaped final proofreading.       Robert London MD  11/21/23 3746

## 2023-11-19 NOTE — ACP (ADVANCE CARE PLANNING)
staff to scan into medical record. Routed ACP note to attending provider or other IDT member.   Teach Back Method used to verify the patient's and/or Healthcare Decision Maker's understanding of key information in the advance directive documents    Electronically signed by Carmella Quezada, [address] [place] [address] on 11/19/2023 at 4:33 PM

## 2023-11-19 NOTE — ED TRIAGE NOTES
Pt here for evaluation of gangrenous left 2nd toe. Pt states he has an appt with vascular surgeon on Monday. Was told he could come into the ED for admission for toe amputation.

## 2023-11-19 NOTE — ED NOTES
Pt to ED with note on prescription pad from [facility] and [address] on 11-17-23, Dr. Funmilayo Florez, [phone], requesting to admit pt under the hospitalist and consult  Dr. Getachew Olson.       Yue Goode, RN  11/18/23 2015

## 2023-11-20 ENCOUNTER — ANESTHESIA (OUTPATIENT)
Facility: HOSPITAL | Age: 63
DRG: 253 | End: 2023-11-20
Payer: MEDICARE

## 2023-11-20 ENCOUNTER — ANESTHESIA EVENT (OUTPATIENT)
Facility: HOSPITAL | Age: 63
DRG: 253 | End: 2023-11-20
Payer: MEDICARE

## 2023-11-20 ENCOUNTER — APPOINTMENT (OUTPATIENT)
Facility: HOSPITAL | Age: 63
DRG: 253 | End: 2023-11-20
Attending: INTERNAL MEDICINE
Payer: MEDICARE

## 2023-11-20 PROBLEM — R73.03 BORDERLINE DIABETES MELLITUS: Status: ACTIVE | Noted: 2023-11-20

## 2023-11-20 LAB
ANION GAP SERPL CALC-SCNC: 6 MMOL/L (ref 3–18)
BASOPHILS # BLD: 0.1 K/UL (ref 0–0.1)
BASOPHILS NFR BLD: 1 % (ref 0–2)
BUN SERPL-MCNC: 17 MG/DL (ref 7–18)
BUN/CREAT SERPL: 16 (ref 12–20)
CALCIUM SERPL-MCNC: 8.8 MG/DL (ref 8.5–10.1)
CHLORIDE SERPL-SCNC: 111 MMOL/L (ref 100–111)
CO2 SERPL-SCNC: 23 MMOL/L (ref 21–32)
CREAT SERPL-MCNC: 1.08 MG/DL (ref 0.6–1.3)
DIFFERENTIAL METHOD BLD: ABNORMAL
ECHO BSA: 2.2 M2
EOSINOPHIL # BLD: 0.3 K/UL (ref 0–0.4)
EOSINOPHIL NFR BLD: 4 % (ref 0–5)
ERYTHROCYTE [DISTWIDTH] IN BLOOD BY AUTOMATED COUNT: 13.2 % (ref 11.6–14.5)
GLUCOSE SERPL-MCNC: 93 MG/DL (ref 74–99)
HCT VFR BLD AUTO: 42.3 % (ref 36–48)
HGB BLD-MCNC: 14.1 G/DL (ref 13–16)
IMM GRANULOCYTES # BLD AUTO: 0 K/UL (ref 0–0.04)
IMM GRANULOCYTES NFR BLD AUTO: 1 % (ref 0–0.5)
LYMPHOCYTES # BLD: 1.9 K/UL (ref 0.9–3.6)
LYMPHOCYTES NFR BLD: 23 % (ref 21–52)
MAGNESIUM SERPL-MCNC: 2 MG/DL (ref 1.6–2.6)
MCH RBC QN AUTO: 30.1 PG (ref 24–34)
MCHC RBC AUTO-ENTMCNC: 33.3 G/DL (ref 31–37)
MCV RBC AUTO: 90.2 FL (ref 78–100)
MONOCYTES # BLD: 0.7 K/UL (ref 0.05–1.2)
MONOCYTES NFR BLD: 9 % (ref 3–10)
NEUTS SEG # BLD: 5.2 K/UL (ref 1.8–8)
NEUTS SEG NFR BLD: 63 % (ref 40–73)
NRBC # BLD: 0 K/UL (ref 0–0.01)
NRBC BLD-RTO: 0 PER 100 WBC
PLATELET # BLD AUTO: 261 K/UL (ref 135–420)
PMV BLD AUTO: 10.3 FL (ref 9.2–11.8)
POTASSIUM SERPL-SCNC: 4 MMOL/L (ref 3.5–5.5)
RBC # BLD AUTO: 4.69 M/UL (ref 4.35–5.65)
SODIUM SERPL-SCNC: 140 MMOL/L (ref 136–145)
VANCOMYCIN SERPL-MCNC: 14.7 UG/ML (ref 5–40)
VAS LEFT ABI: 0.59
VAS LEFT ARM BP: 137 MMHG
VAS LEFT DORSALIS PEDIS BP: 80 MMHG
VAS LEFT PTA BP: 88 MMHG
VAS LEFT TBI: 0.26
VAS LEFT TOE PRESSURE: 38 MMHG
VAS RIGHT ABI: 1
VAS RIGHT ARM BP: 148 MMHG
VAS RIGHT DORSALIS PEDIS BP: 129 MMHG
VAS RIGHT PTA BP: 148 MMHG
VAS RIGHT TBI: 0.37
VAS RIGHT TOE PRESSURE: 55 MMHG
WBC # BLD AUTO: 8.2 K/UL (ref 4.6–13.2)

## 2023-11-20 PROCEDURE — 3600000012 HC SURGERY LEVEL 2 ADDTL 15MIN: Performed by: PODIATRIST

## 2023-11-20 PROCEDURE — 3700000001 HC ADD 15 MINUTES (ANESTHESIA): Performed by: PODIATRIST

## 2023-11-20 PROCEDURE — 83735 ASSAY OF MAGNESIUM: CPT

## 2023-11-20 PROCEDURE — 3600000002 HC SURGERY LEVEL 2 BASE: Performed by: PODIATRIST

## 2023-11-20 PROCEDURE — 6370000000 HC RX 637 (ALT 250 FOR IP): Performed by: INTERNAL MEDICINE

## 2023-11-20 PROCEDURE — 88305 TISSUE EXAM BY PATHOLOGIST: CPT

## 2023-11-20 PROCEDURE — 7100000000 HC PACU RECOVERY - FIRST 15 MIN: Performed by: PODIATRIST

## 2023-11-20 PROCEDURE — 1100000000 HC RM PRIVATE

## 2023-11-20 PROCEDURE — 88311 DECALCIFY TISSUE: CPT

## 2023-11-20 PROCEDURE — 6360000002 HC RX W HCPCS: Performed by: NURSE ANESTHETIST, CERTIFIED REGISTERED

## 2023-11-20 PROCEDURE — 80048 BASIC METABOLIC PNL TOTAL CA: CPT

## 2023-11-20 PROCEDURE — 80202 ASSAY OF VANCOMYCIN: CPT

## 2023-11-20 PROCEDURE — 93925 LOWER EXTREMITY STUDY: CPT | Performed by: INTERNAL MEDICINE

## 2023-11-20 PROCEDURE — 2500000003 HC RX 250 WO HCPCS: Performed by: PODIATRIST

## 2023-11-20 PROCEDURE — 93925 LOWER EXTREMITY STUDY: CPT

## 2023-11-20 PROCEDURE — 2580000003 HC RX 258: Performed by: INTERNAL MEDICINE

## 2023-11-20 PROCEDURE — 87077 CULTURE AEROBIC IDENTIFY: CPT

## 2023-11-20 PROCEDURE — 6360000002 HC RX W HCPCS: Performed by: INTERNAL MEDICINE

## 2023-11-20 PROCEDURE — 87205 SMEAR GRAM STAIN: CPT

## 2023-11-20 PROCEDURE — 87075 CULTR BACTERIA EXCEPT BLOOD: CPT

## 2023-11-20 PROCEDURE — 6370000000 HC RX 637 (ALT 250 FOR IP): Performed by: HOSPITALIST

## 2023-11-20 PROCEDURE — 94761 N-INVAS EAR/PLS OXIMETRY MLT: CPT

## 2023-11-20 PROCEDURE — 7100000001 HC PACU RECOVERY - ADDTL 15 MIN: Performed by: PODIATRIST

## 2023-11-20 PROCEDURE — 2709999900 HC NON-CHARGEABLE SUPPLY: Performed by: PODIATRIST

## 2023-11-20 PROCEDURE — 2500000003 HC RX 250 WO HCPCS: Performed by: NURSE ANESTHETIST, CERTIFIED REGISTERED

## 2023-11-20 PROCEDURE — 93926 LOWER EXTREMITY STUDY: CPT

## 2023-11-20 PROCEDURE — 85025 COMPLETE CBC W/AUTO DIFF WBC: CPT

## 2023-11-20 PROCEDURE — 87070 CULTURE OTHR SPECIMN AEROBIC: CPT

## 2023-11-20 PROCEDURE — 87186 SC STD MICRODIL/AGAR DIL: CPT

## 2023-11-20 PROCEDURE — 36415 COLL VENOUS BLD VENIPUNCTURE: CPT

## 2023-11-20 PROCEDURE — 99232 SBSQ HOSP IP/OBS MODERATE 35: CPT | Performed by: HOSPITALIST

## 2023-11-20 PROCEDURE — 3700000000 HC ANESTHESIA ATTENDED CARE: Performed by: PODIATRIST

## 2023-11-20 RX ORDER — LIDOCAINE HYDROCHLORIDE 20 MG/ML
INJECTION, SOLUTION EPIDURAL; INFILTRATION; INTRACAUDAL; PERINEURAL PRN
Status: DISCONTINUED | OUTPATIENT
Start: 2023-11-20 | End: 2023-11-20 | Stop reason: HOSPADM

## 2023-11-20 RX ORDER — SODIUM CHLORIDE 9 MG/ML
INJECTION, SOLUTION INTRAVENOUS PRN
Status: CANCELLED | OUTPATIENT
Start: 2023-11-20

## 2023-11-20 RX ORDER — MAGNESIUM SULFATE HEPTAHYDRATE 500 MG/ML
INJECTION, SOLUTION INTRAMUSCULAR; INTRAVENOUS PRN
Status: DISCONTINUED | OUTPATIENT
Start: 2023-11-20 | End: 2023-11-20 | Stop reason: SDUPTHER

## 2023-11-20 RX ORDER — FENTANYL CITRATE 50 UG/ML
50 INJECTION, SOLUTION INTRAMUSCULAR; INTRAVENOUS EVERY 5 MIN PRN
Status: CANCELLED | OUTPATIENT
Start: 2023-11-20

## 2023-11-20 RX ORDER — ATORVASTATIN CALCIUM 20 MG/1
20 TABLET, FILM COATED ORAL DAILY
Status: DISCONTINUED | OUTPATIENT
Start: 2023-11-20 | End: 2023-11-23 | Stop reason: HOSPADM

## 2023-11-20 RX ORDER — DEXMEDETOMIDINE HYDROCHLORIDE 100 UG/ML
INJECTION, SOLUTION INTRAVENOUS PRN
Status: DISCONTINUED | OUTPATIENT
Start: 2023-11-20 | End: 2023-11-20 | Stop reason: SDUPTHER

## 2023-11-20 RX ORDER — ONDANSETRON 2 MG/ML
4 INJECTION INTRAMUSCULAR; INTRAVENOUS
Status: CANCELLED | OUTPATIENT
Start: 2023-11-20 | End: 2023-11-21

## 2023-11-20 RX ORDER — FENTANYL CITRATE 50 UG/ML
100 INJECTION, SOLUTION INTRAMUSCULAR; INTRAVENOUS EVERY 5 MIN PRN
Status: CANCELLED | OUTPATIENT
Start: 2023-11-20

## 2023-11-20 RX ORDER — PROPOFOL 10 MG/ML
INJECTION, EMULSION INTRAVENOUS CONTINUOUS PRN
Status: DISCONTINUED | OUTPATIENT
Start: 2023-11-20 | End: 2023-11-20 | Stop reason: SDUPTHER

## 2023-11-20 RX ORDER — FENTANYL CITRATE 50 UG/ML
INJECTION, SOLUTION INTRAMUSCULAR; INTRAVENOUS PRN
Status: DISCONTINUED | OUTPATIENT
Start: 2023-11-20 | End: 2023-11-20 | Stop reason: SDUPTHER

## 2023-11-20 RX ORDER — SODIUM CHLORIDE 0.9 % (FLUSH) 0.9 %
5-40 SYRINGE (ML) INJECTION PRN
Status: CANCELLED | OUTPATIENT
Start: 2023-11-20

## 2023-11-20 RX ORDER — AMLODIPINE BESYLATE 5 MG/1
5 TABLET ORAL DAILY
Status: DISCONTINUED | OUTPATIENT
Start: 2023-11-20 | End: 2023-11-22

## 2023-11-20 RX ORDER — LIDOCAINE HYDROCHLORIDE 20 MG/ML
INJECTION, SOLUTION EPIDURAL; INFILTRATION; INTRACAUDAL; PERINEURAL PRN
Status: DISCONTINUED | OUTPATIENT
Start: 2023-11-20 | End: 2023-11-20 | Stop reason: SDUPTHER

## 2023-11-20 RX ORDER — SODIUM CHLORIDE 0.9 % (FLUSH) 0.9 %
5-40 SYRINGE (ML) INJECTION EVERY 12 HOURS SCHEDULED
Status: CANCELLED | OUTPATIENT
Start: 2023-11-20

## 2023-11-20 RX ORDER — ASPIRIN 81 MG/1
81 TABLET ORAL DAILY
Status: DISCONTINUED | OUTPATIENT
Start: 2023-11-21 | End: 2023-11-23 | Stop reason: HOSPADM

## 2023-11-20 RX ADMIN — ATORVASTATIN CALCIUM 20 MG: 20 TABLET, FILM COATED ORAL at 15:43

## 2023-11-20 RX ADMIN — LIDOCAINE HYDROCHLORIDE 100 MG: 20 INJECTION, SOLUTION EPIDURAL; INFILTRATION; INTRACAUDAL; PERINEURAL at 17:00

## 2023-11-20 RX ADMIN — PIPERACILLIN AND TAZOBACTAM 3375 MG: 3; .375 INJECTION, POWDER, LYOPHILIZED, FOR SOLUTION INTRAVENOUS at 08:15

## 2023-11-20 RX ADMIN — MAGNESIUM SULFATE HEPTAHYDRATE 1 G: 500 INJECTION, SOLUTION INTRAMUSCULAR; INTRAVENOUS at 17:00

## 2023-11-20 RX ADMIN — VANCOMYCIN HYDROCHLORIDE 1000 MG: 1 INJECTION, POWDER, LYOPHILIZED, FOR SOLUTION INTRAVENOUS at 20:00

## 2023-11-20 RX ADMIN — DEXMEDETOMIDINE HYDROCHLORIDE 10 MCG: 100 INJECTION, SOLUTION INTRAVENOUS at 17:00

## 2023-11-20 RX ADMIN — SODIUM CHLORIDE: 9 INJECTION, SOLUTION INTRAVENOUS at 17:02

## 2023-11-20 RX ADMIN — AMLODIPINE BESYLATE 5 MG: 5 TABLET ORAL at 15:43

## 2023-11-20 RX ADMIN — TRAMADOL HYDROCHLORIDE 50 MG: 50 TABLET ORAL at 15:18

## 2023-11-20 RX ADMIN — ACETAMINOPHEN 325MG 650 MG: 325 TABLET ORAL at 11:27

## 2023-11-20 RX ADMIN — VANCOMYCIN HYDROCHLORIDE 1000 MG: 1 INJECTION, POWDER, LYOPHILIZED, FOR SOLUTION INTRAVENOUS at 06:21

## 2023-11-20 RX ADMIN — TRAMADOL HYDROCHLORIDE 50 MG: 50 TABLET ORAL at 23:52

## 2023-11-20 RX ADMIN — FENTANYL CITRATE 25 MCG: 50 INJECTION INTRAMUSCULAR; INTRAVENOUS at 17:17

## 2023-11-20 RX ADMIN — FENTANYL CITRATE 25 MCG: 50 INJECTION INTRAMUSCULAR; INTRAVENOUS at 17:00

## 2023-11-20 RX ADMIN — FENTANYL CITRATE 50 MCG: 50 INJECTION INTRAMUSCULAR; INTRAVENOUS at 17:22

## 2023-11-20 RX ADMIN — PIPERACILLIN AND TAZOBACTAM 3375 MG: 3; .375 INJECTION, POWDER, LYOPHILIZED, FOR SOLUTION INTRAVENOUS at 00:18

## 2023-11-20 RX ADMIN — ACETAMINOPHEN 325MG 650 MG: 325 TABLET ORAL at 05:03

## 2023-11-20 RX ADMIN — SODIUM CHLORIDE, PRESERVATIVE FREE 10 ML: 5 INJECTION INTRAVENOUS at 08:16

## 2023-11-20 RX ADMIN — PIPERACILLIN AND TAZOBACTAM 3375 MG: 3; .375 INJECTION, POWDER, LYOPHILIZED, FOR SOLUTION INTRAVENOUS at 23:52

## 2023-11-20 RX ADMIN — SODIUM CHLORIDE, PRESERVATIVE FREE 10 ML: 5 INJECTION INTRAVENOUS at 21:35

## 2023-11-20 RX ADMIN — PIPERACILLIN AND TAZOBACTAM 3375 MG: 3; .375 INJECTION, POWDER, LYOPHILIZED, FOR SOLUTION INTRAVENOUS at 17:20

## 2023-11-20 RX ADMIN — TRAMADOL HYDROCHLORIDE 50 MG: 50 TABLET ORAL at 00:18

## 2023-11-20 RX ADMIN — PROPOFOL 120 MCG/KG/MIN: 10 INJECTION, EMULSION INTRAVENOUS at 17:00

## 2023-11-20 RX ADMIN — PIPERACILLIN AND TAZOBACTAM 3375 MG: 3; .375 INJECTION, POWDER, LYOPHILIZED, FOR SOLUTION INTRAVENOUS at 15:40

## 2023-11-20 ASSESSMENT — PAIN DESCRIPTION - DESCRIPTORS
DESCRIPTORS: SHOOTING
DESCRIPTORS: ACHING
DESCRIPTORS: ACHING;SHOOTING;SORE
DESCRIPTORS: SHOOTING

## 2023-11-20 ASSESSMENT — PAIN SCALES - GENERAL
PAINLEVEL_OUTOF10: 10
PAINLEVEL_OUTOF10: 6
PAINLEVEL_OUTOF10: 9
PAINLEVEL_OUTOF10: 4
PAINLEVEL_OUTOF10: 0
PAINLEVEL_OUTOF10: 0
PAINLEVEL_OUTOF10: 3
PAINLEVEL_OUTOF10: 4
PAINLEVEL_OUTOF10: 2
PAINLEVEL_OUTOF10: 0

## 2023-11-20 ASSESSMENT — PAIN DESCRIPTION - LOCATION
LOCATION: FOOT
LOCATION: FOOT
LOCATION: TOE (COMMENT WHICH ONE)
LOCATION: TOE (COMMENT WHICH ONE)

## 2023-11-20 ASSESSMENT — PAIN - FUNCTIONAL ASSESSMENT
PAIN_FUNCTIONAL_ASSESSMENT: PREVENTS OR INTERFERES SOME ACTIVE ACTIVITIES AND ADLS
PAIN_FUNCTIONAL_ASSESSMENT: ACTIVITIES ARE NOT PREVENTED
PAIN_FUNCTIONAL_ASSESSMENT: ACTIVITIES ARE NOT PREVENTED

## 2023-11-20 ASSESSMENT — PAIN DESCRIPTION - ORIENTATION
ORIENTATION: LEFT

## 2023-11-20 ASSESSMENT — PAIN DESCRIPTION - PAIN TYPE: TYPE: ACUTE PAIN

## 2023-11-20 ASSESSMENT — LIFESTYLE VARIABLES: SMOKING_STATUS: 1

## 2023-11-20 ASSESSMENT — PAIN DESCRIPTION - FREQUENCY: FREQUENCY: CONTINUOUS

## 2023-11-20 NOTE — PERIOP NOTE
TRANSFER - OUT REPORT:    Verbal report given to Maria Teresa on Corinne Recinos  being transferred to  [facility] for routine post-op       Report consisted of patient's Situation, Background, Assessment and   Recommendations(SBAR). Information from the following report(s) Nurse Handoff Report, Surgery Report, and MAR was reviewed with the receiving nurse. Lines:   Peripheral IV 11/18/23 Left Antecubital (Active)   Site Assessment Clean, dry & intact 11/20/23 1735   Line Status Flushed 11/20/23 [address] [facility] [facility] Connections checked and tightened 11/20/23 1735   Phlebitis Assessment No symptoms 11/20/23 1735   Infiltration Assessment 0 11/20/23 1735   Alcohol Cap Used Yes 11/20/23 1735   Dressing Status Clean, dry & intact 11/20/23 1735   Dressing Type Transparent 11/20/23 1735       Peripheral IV 11/20/23 Right Forearm (Active)        Opportunity for questions and clarification was provided.       Patient transported with:  Hospital Transporter

## 2023-11-20 NOTE — ANESTHESIA PRE PROCEDURE
Anesthesia Plan      MAC     ASA 3       Induction: intravenous. MIPS: Postoperative opioids intended. Anesthetic plan and risks discussed with patient.                         Luc Pop MD   11/20/2023

## 2023-11-21 LAB
EST. AVERAGE GLUCOSE BLD GHB EST-MCNC: 108 MG/DL
HBA1C MFR BLD: 5.4 % (ref 4.2–5.6)

## 2023-11-21 PROCEDURE — 1100000000 HC RM PRIVATE

## 2023-11-21 PROCEDURE — 0Y6S0Z0 DETACHMENT AT LEFT 2ND TOE, COMPLETE, OPEN APPROACH: ICD-10-PCS | Performed by: PODIATRIST

## 2023-11-21 PROCEDURE — 94761 N-INVAS EAR/PLS OXIMETRY MLT: CPT

## 2023-11-21 PROCEDURE — 2580000003 HC RX 258: Performed by: INTERNAL MEDICINE

## 2023-11-21 PROCEDURE — 99232 SBSQ HOSP IP/OBS MODERATE 35: CPT | Performed by: HOSPITALIST

## 2023-11-21 PROCEDURE — 83036 HEMOGLOBIN GLYCOSYLATED A1C: CPT

## 2023-11-21 PROCEDURE — 6370000000 HC RX 637 (ALT 250 FOR IP): Performed by: HOSPITALIST

## 2023-11-21 PROCEDURE — 6370000000 HC RX 637 (ALT 250 FOR IP): Performed by: INTERNAL MEDICINE

## 2023-11-21 PROCEDURE — 36415 COLL VENOUS BLD VENIPUNCTURE: CPT

## 2023-11-21 PROCEDURE — 6360000002 HC RX W HCPCS: Performed by: INTERNAL MEDICINE

## 2023-11-21 RX ADMIN — PIPERACILLIN AND TAZOBACTAM 3375 MG: 3; .375 INJECTION, POWDER, LYOPHILIZED, FOR SOLUTION INTRAVENOUS at 08:26

## 2023-11-21 RX ADMIN — ASPIRIN 81 MG: 81 TABLET, COATED ORAL at 08:23

## 2023-11-21 RX ADMIN — ACETAMINOPHEN 325MG 650 MG: 325 TABLET ORAL at 03:49

## 2023-11-21 RX ADMIN — TRAMADOL HYDROCHLORIDE 50 MG: 50 TABLET ORAL at 21:37

## 2023-11-21 RX ADMIN — ACETAMINOPHEN 325MG 650 MG: 325 TABLET ORAL at 16:59

## 2023-11-21 RX ADMIN — PIPERACILLIN AND TAZOBACTAM 3375 MG: 3; .375 INJECTION, POWDER, LYOPHILIZED, FOR SOLUTION INTRAVENOUS at 16:59

## 2023-11-21 RX ADMIN — TRAMADOL HYDROCHLORIDE 50 MG: 50 TABLET ORAL at 07:08

## 2023-11-21 RX ADMIN — AMLODIPINE BESYLATE 5 MG: 5 TABLET ORAL at 08:23

## 2023-11-21 RX ADMIN — ATORVASTATIN CALCIUM 20 MG: 20 TABLET, FILM COATED ORAL at 08:23

## 2023-11-21 RX ADMIN — VANCOMYCIN HYDROCHLORIDE 1000 MG: 1 INJECTION, POWDER, LYOPHILIZED, FOR SOLUTION INTRAVENOUS at 06:39

## 2023-11-21 RX ADMIN — SODIUM CHLORIDE, PRESERVATIVE FREE 10 ML: 5 INJECTION INTRAVENOUS at 08:30

## 2023-11-21 RX ADMIN — ACETAMINOPHEN 325MG 650 MG: 325 TABLET ORAL at 23:47

## 2023-11-21 RX ADMIN — PIPERACILLIN AND TAZOBACTAM 3375 MG: 3; .375 INJECTION, POWDER, LYOPHILIZED, FOR SOLUTION INTRAVENOUS at 23:48

## 2023-11-21 RX ADMIN — SODIUM CHLORIDE, PRESERVATIVE FREE 10 ML: 5 INJECTION INTRAVENOUS at 22:08

## 2023-11-21 RX ADMIN — VANCOMYCIN HYDROCHLORIDE 1000 MG: 1 INJECTION, POWDER, LYOPHILIZED, FOR SOLUTION INTRAVENOUS at 18:52

## 2023-11-21 RX ADMIN — TRAMADOL HYDROCHLORIDE 50 MG: 50 TABLET ORAL at 14:10

## 2023-11-21 ASSESSMENT — PAIN DESCRIPTION - DESCRIPTORS
DESCRIPTORS: ACHING;THROBBING
DESCRIPTORS: SORE
DESCRIPTORS: ACHING
DESCRIPTORS: ACHING;THROBBING

## 2023-11-21 ASSESSMENT — PAIN DESCRIPTION - ORIENTATION
ORIENTATION: LEFT

## 2023-11-21 ASSESSMENT — PAIN SCALES - GENERAL
PAINLEVEL_OUTOF10: 8
PAINLEVEL_OUTOF10: 0
PAINLEVEL_OUTOF10: 2
PAINLEVEL_OUTOF10: 5
PAINLEVEL_OUTOF10: 2
PAINLEVEL_OUTOF10: 2
PAINLEVEL_OUTOF10: 5
PAINLEVEL_OUTOF10: 6
PAINLEVEL_OUTOF10: 0

## 2023-11-21 ASSESSMENT — PAIN - FUNCTIONAL ASSESSMENT
PAIN_FUNCTIONAL_ASSESSMENT: ACTIVITIES ARE NOT PREVENTED

## 2023-11-21 ASSESSMENT — PAIN DESCRIPTION - LOCATION
LOCATION: FOOT

## 2023-11-21 ASSESSMENT — PAIN SCALES - WONG BAKER: WONGBAKER_NUMERICALRESPONSE: 0

## 2023-11-22 PROBLEM — I73.9 PAD (PERIPHERAL ARTERY DISEASE) (HCC): Status: ACTIVE | Noted: 2023-11-22

## 2023-11-22 PROBLEM — D72.829 LEUKOCYTOSIS: Status: ACTIVE | Noted: 2023-11-22

## 2023-11-22 LAB
ACT BLD: 287 SECS (ref 79–138)
ACT BLD: 305 SECS (ref 79–138)
ANION GAP SERPL CALC-SCNC: 5 MMOL/L (ref 3–18)
BASOPHILS # BLD: 0.1 K/UL (ref 0–0.1)
BASOPHILS NFR BLD: 1 % (ref 0–2)
BUN SERPL-MCNC: 14 MG/DL (ref 7–18)
BUN/CREAT SERPL: 13 (ref 12–20)
CALCIUM SERPL-MCNC: 9 MG/DL (ref 8.5–10.1)
CHLORIDE SERPL-SCNC: 107 MMOL/L (ref 100–111)
CO2 SERPL-SCNC: 24 MMOL/L (ref 21–32)
CREAT SERPL-MCNC: 1.07 MG/DL (ref 0.6–1.3)
DIFFERENTIAL METHOD BLD: ABNORMAL
ECHO BSA: 2.2 M2
EOSINOPHIL # BLD: 0.4 K/UL (ref 0–0.4)
EOSINOPHIL NFR BLD: 4 % (ref 0–5)
ERYTHROCYTE [DISTWIDTH] IN BLOOD BY AUTOMATED COUNT: 12.9 % (ref 11.6–14.5)
GLUCOSE SERPL-MCNC: 115 MG/DL (ref 74–99)
HCT VFR BLD AUTO: 41.5 % (ref 36–48)
HGB BLD-MCNC: 13.5 G/DL (ref 13–16)
IMM GRANULOCYTES # BLD AUTO: 0.1 K/UL (ref 0–0.04)
IMM GRANULOCYTES NFR BLD AUTO: 1 % (ref 0–0.5)
LYMPHOCYTES # BLD: 1.3 K/UL (ref 0.9–3.6)
LYMPHOCYTES NFR BLD: 14 % (ref 21–52)
MAGNESIUM SERPL-MCNC: 1.8 MG/DL (ref 1.6–2.6)
MCH RBC QN AUTO: 29.2 PG (ref 24–34)
MCHC RBC AUTO-ENTMCNC: 32.5 G/DL (ref 31–37)
MCV RBC AUTO: 89.8 FL (ref 78–100)
MONOCYTES # BLD: 0.8 K/UL (ref 0.05–1.2)
MONOCYTES NFR BLD: 8 % (ref 3–10)
NEUTS SEG # BLD: 6.8 K/UL (ref 1.8–8)
NEUTS SEG NFR BLD: 73 % (ref 40–73)
NRBC # BLD: 0 K/UL (ref 0–0.01)
NRBC BLD-RTO: 0 PER 100 WBC
PLATELET # BLD AUTO: 247 K/UL (ref 135–420)
PMV BLD AUTO: 10.8 FL (ref 9.2–11.8)
POTASSIUM SERPL-SCNC: 3.9 MMOL/L (ref 3.5–5.5)
RBC # BLD AUTO: 4.62 M/UL (ref 4.35–5.65)
SODIUM SERPL-SCNC: 136 MMOL/L (ref 136–145)
VANCOMYCIN SERPL-MCNC: 22.2 UG/ML (ref 5–40)
WBC # BLD AUTO: 9.3 K/UL (ref 4.6–13.2)

## 2023-11-22 PROCEDURE — C1894 INTRO/SHEATH, NON-LASER: HCPCS | Performed by: STUDENT IN AN ORGANIZED HEALTH CARE EDUCATION/TRAINING PROGRAM

## 2023-11-22 PROCEDURE — C1887 CATHETER, GUIDING: HCPCS | Performed by: STUDENT IN AN ORGANIZED HEALTH CARE EDUCATION/TRAINING PROGRAM

## 2023-11-22 PROCEDURE — 83735 ASSAY OF MAGNESIUM: CPT

## 2023-11-22 PROCEDURE — C1769 GUIDE WIRE: HCPCS | Performed by: STUDENT IN AN ORGANIZED HEALTH CARE EDUCATION/TRAINING PROGRAM

## 2023-11-22 PROCEDURE — 6370000000 HC RX 637 (ALT 250 FOR IP): Performed by: HOSPITALIST

## 2023-11-22 PROCEDURE — 6370000000 HC RX 637 (ALT 250 FOR IP): Performed by: INTERNAL MEDICINE

## 2023-11-22 PROCEDURE — 99152 MOD SED SAME PHYS/QHP 5/>YRS: CPT | Performed by: STUDENT IN AN ORGANIZED HEALTH CARE EDUCATION/TRAINING PROGRAM

## 2023-11-22 PROCEDURE — 80202 ASSAY OF VANCOMYCIN: CPT

## 2023-11-22 PROCEDURE — 85025 COMPLETE CBC W/AUTO DIFF WBC: CPT

## 2023-11-22 PROCEDURE — 6360000002 HC RX W HCPCS: Performed by: HOSPITALIST

## 2023-11-22 PROCEDURE — B41G1ZZ FLUOROSCOPY OF LEFT LOWER EXTREMITY ARTERIES USING LOW OSMOLAR CONTRAST: ICD-10-PCS | Performed by: STUDENT IN AN ORGANIZED HEALTH CARE EDUCATION/TRAINING PROGRAM

## 2023-11-22 PROCEDURE — 37224 HC FEM POP TERRITORY PLASTY: CPT | Performed by: STUDENT IN AN ORGANIZED HEALTH CARE EDUCATION/TRAINING PROGRAM

## 2023-11-22 PROCEDURE — C1760 CLOSURE DEV, VASC: HCPCS | Performed by: STUDENT IN AN ORGANIZED HEALTH CARE EDUCATION/TRAINING PROGRAM

## 2023-11-22 PROCEDURE — 047L3ZZ DILATION OF LEFT FEMORAL ARTERY, PERCUTANEOUS APPROACH: ICD-10-PCS | Performed by: STUDENT IN AN ORGANIZED HEALTH CARE EDUCATION/TRAINING PROGRAM

## 2023-11-22 PROCEDURE — 2580000003 HC RX 258: Performed by: INTERNAL MEDICINE

## 2023-11-22 PROCEDURE — 2500000003 HC RX 250 WO HCPCS: Performed by: STUDENT IN AN ORGANIZED HEALTH CARE EDUCATION/TRAINING PROGRAM

## 2023-11-22 PROCEDURE — 97165 OT EVAL LOW COMPLEX 30 MIN: CPT

## 2023-11-22 PROCEDURE — C1725 CATH, TRANSLUMIN NON-LASER: HCPCS | Performed by: STUDENT IN AN ORGANIZED HEALTH CARE EDUCATION/TRAINING PROGRAM

## 2023-11-22 PROCEDURE — 99153 MOD SED SAME PHYS/QHP EA: CPT | Performed by: STUDENT IN AN ORGANIZED HEALTH CARE EDUCATION/TRAINING PROGRAM

## 2023-11-22 PROCEDURE — 75630 X-RAY AORTA LEG ARTERIES: CPT | Performed by: STUDENT IN AN ORGANIZED HEALTH CARE EDUCATION/TRAINING PROGRAM

## 2023-11-22 PROCEDURE — 76937 US GUIDE VASCULAR ACCESS: CPT | Performed by: STUDENT IN AN ORGANIZED HEALTH CARE EDUCATION/TRAINING PROGRAM

## 2023-11-22 PROCEDURE — 85347 COAGULATION TIME ACTIVATED: CPT

## 2023-11-22 PROCEDURE — 6360000002 HC RX W HCPCS: Performed by: STUDENT IN AN ORGANIZED HEALTH CARE EDUCATION/TRAINING PROGRAM

## 2023-11-22 PROCEDURE — 6360000002 HC RX W HCPCS: Performed by: INTERNAL MEDICINE

## 2023-11-22 PROCEDURE — 99232 SBSQ HOSP IP/OBS MODERATE 35: CPT | Performed by: HOSPITALIST

## 2023-11-22 PROCEDURE — 80048 BASIC METABOLIC PNL TOTAL CA: CPT

## 2023-11-22 PROCEDURE — 6360000004 HC RX CONTRAST MEDICATION: Performed by: STUDENT IN AN ORGANIZED HEALTH CARE EDUCATION/TRAINING PROGRAM

## 2023-11-22 PROCEDURE — 36415 COLL VENOUS BLD VENIPUNCTURE: CPT

## 2023-11-22 PROCEDURE — 2709999900 HC NON-CHARGEABLE SUPPLY: Performed by: STUDENT IN AN ORGANIZED HEALTH CARE EDUCATION/TRAINING PROGRAM

## 2023-11-22 PROCEDURE — 1100000000 HC RM PRIVATE

## 2023-11-22 PROCEDURE — 75710 ARTERY X-RAYS ARM/LEG: CPT | Performed by: STUDENT IN AN ORGANIZED HEALTH CARE EDUCATION/TRAINING PROGRAM

## 2023-11-22 RX ORDER — AMLODIPINE BESYLATE 10 MG/1
10 TABLET ORAL DAILY
Status: DISCONTINUED | OUTPATIENT
Start: 2023-11-23 | End: 2023-11-23 | Stop reason: HOSPADM

## 2023-11-22 RX ORDER — HEPARIN SODIUM 1000 [USP'U]/ML
INJECTION, SOLUTION INTRAVENOUS; SUBCUTANEOUS PRN
Status: DISCONTINUED | OUTPATIENT
Start: 2023-11-22 | End: 2023-11-22 | Stop reason: HOSPADM

## 2023-11-22 RX ORDER — VANCOMYCIN 1.75 G/350ML
1250 INJECTION, SOLUTION INTRAVENOUS EVERY 12 HOURS
Status: DISCONTINUED | OUTPATIENT
Start: 2023-11-22 | End: 2023-11-22

## 2023-11-22 RX ORDER — PROTAMINE SULFATE 10 MG/ML
INJECTION, SOLUTION INTRAVENOUS PRN
Status: DISCONTINUED | OUTPATIENT
Start: 2023-11-22 | End: 2023-11-22 | Stop reason: HOSPADM

## 2023-11-22 RX ORDER — FENTANYL CITRATE 50 UG/ML
INJECTION, SOLUTION INTRAMUSCULAR; INTRAVENOUS PRN
Status: DISCONTINUED | OUTPATIENT
Start: 2023-11-22 | End: 2023-11-22 | Stop reason: HOSPADM

## 2023-11-22 RX ORDER — VANCOMYCIN 1.75 G/350ML
1250 INJECTION, SOLUTION INTRAVENOUS
Status: DISCONTINUED | OUTPATIENT
Start: 2023-11-22 | End: 2023-11-23

## 2023-11-22 RX ORDER — AMLODIPINE BESYLATE 5 MG/1
5 TABLET ORAL
Status: COMPLETED | OUTPATIENT
Start: 2023-11-22 | End: 2023-11-22

## 2023-11-22 RX ORDER — IODIXANOL 270 MG/ML
INJECTION, SOLUTION INTRAVASCULAR PRN
Status: DISCONTINUED | OUTPATIENT
Start: 2023-11-22 | End: 2023-11-22 | Stop reason: HOSPADM

## 2023-11-22 RX ORDER — MIDAZOLAM HYDROCHLORIDE 1 MG/ML
INJECTION INTRAMUSCULAR; INTRAVENOUS PRN
Status: DISCONTINUED | OUTPATIENT
Start: 2023-11-22 | End: 2023-11-22 | Stop reason: HOSPADM

## 2023-11-22 RX ADMIN — SODIUM CHLORIDE, PRESERVATIVE FREE 10 ML: 5 INJECTION INTRAVENOUS at 16:48

## 2023-11-22 RX ADMIN — TRAMADOL HYDROCHLORIDE 50 MG: 50 TABLET ORAL at 13:24

## 2023-11-22 RX ADMIN — ATORVASTATIN CALCIUM 20 MG: 20 TABLET, FILM COATED ORAL at 08:15

## 2023-11-22 RX ADMIN — TRAMADOL HYDROCHLORIDE 50 MG: 50 TABLET ORAL at 02:49

## 2023-11-22 RX ADMIN — VANCOMYCIN HYDROCHLORIDE 1000 MG: 1 INJECTION, POWDER, LYOPHILIZED, FOR SOLUTION INTRAVENOUS at 06:28

## 2023-11-22 RX ADMIN — ACETAMINOPHEN 325MG 650 MG: 325 TABLET ORAL at 16:47

## 2023-11-22 RX ADMIN — ASPIRIN 81 MG: 81 TABLET, COATED ORAL at 08:15

## 2023-11-22 RX ADMIN — TRAMADOL HYDROCHLORIDE 50 MG: 50 TABLET ORAL at 21:04

## 2023-11-22 RX ADMIN — PIPERACILLIN AND TAZOBACTAM 3375 MG: 3; .375 INJECTION, POWDER, LYOPHILIZED, FOR SOLUTION INTRAVENOUS at 23:40

## 2023-11-22 RX ADMIN — PIPERACILLIN AND TAZOBACTAM 3375 MG: 3; .375 INJECTION, POWDER, LYOPHILIZED, FOR SOLUTION INTRAVENOUS at 15:37

## 2023-11-22 RX ADMIN — PIPERACILLIN AND TAZOBACTAM 3375 MG: 3; .375 INJECTION, POWDER, LYOPHILIZED, FOR SOLUTION INTRAVENOUS at 08:15

## 2023-11-22 RX ADMIN — VANCOMYCIN 1250 MG: 1.75 INJECTION, SOLUTION INTRAVENOUS at 21:13

## 2023-11-22 RX ADMIN — AMLODIPINE BESYLATE 5 MG: 5 TABLET ORAL at 08:15

## 2023-11-22 RX ADMIN — AMLODIPINE BESYLATE 5 MG: 5 TABLET ORAL at 13:24

## 2023-11-22 ASSESSMENT — PAIN - FUNCTIONAL ASSESSMENT
PAIN_FUNCTIONAL_ASSESSMENT: PREVENTS OR INTERFERES SOME ACTIVE ACTIVITIES AND ADLS
PAIN_FUNCTIONAL_ASSESSMENT: ACTIVITIES ARE NOT PREVENTED

## 2023-11-22 ASSESSMENT — PAIN DESCRIPTION - ORIENTATION
ORIENTATION: LEFT

## 2023-11-22 ASSESSMENT — PAIN DESCRIPTION - ONSET
ONSET: ON-GOING

## 2023-11-22 ASSESSMENT — PAIN DESCRIPTION - PAIN TYPE
TYPE: CHRONIC PAIN
TYPE: CHRONIC PAIN;SURGICAL PAIN
TYPE: SURGICAL PAIN
TYPE: CHRONIC PAIN;SURGICAL PAIN
TYPE: CHRONIC PAIN;SURGICAL PAIN

## 2023-11-22 ASSESSMENT — PAIN DESCRIPTION - DESCRIPTORS
DESCRIPTORS: ACHING

## 2023-11-22 ASSESSMENT — PAIN SCALES - WONG BAKER
WONGBAKER_NUMERICALRESPONSE: 0

## 2023-11-22 ASSESSMENT — PAIN SCALES - GENERAL
PAINLEVEL_OUTOF10: 4
PAINLEVEL_OUTOF10: 3
PAINLEVEL_OUTOF10: 7
PAINLEVEL_OUTOF10: 4
PAINLEVEL_OUTOF10: 0
PAINLEVEL_OUTOF10: 10
PAINLEVEL_OUTOF10: 9
PAINLEVEL_OUTOF10: 2
PAINLEVEL_OUTOF10: 0

## 2023-11-22 ASSESSMENT — PAIN DESCRIPTION - LOCATION
LOCATION: TOE (COMMENT WHICH ONE);BACK
LOCATION: FOOT

## 2023-11-22 ASSESSMENT — PAIN DESCRIPTION - FREQUENCY
FREQUENCY: CONTINUOUS

## 2023-11-22 NOTE — OP NOTE
Operative Note      Patient: Jocelin Silverio  YOB: 1960  MRN: [id number]    Date of Procedure: 11/20/2023    Pre-Op Diagnosis:  Left foot second toe distal tip gangrene with cellulitis    Post-Op Diagnosis: Same       Procedure:  Left second toe partial amputation at proximal interphalangeal joint. Surgeon(s):  Olvin Velasquez DPM    Assistant:   Surgical Assistant: Kay Coto    Anesthesia: Monitor Anesthesia Care with local    Estimated Blood Loss (mL): Minimal    Complications: None    Specimens:   ID Type Source Tests Collected by Time Destination   1 : LEFT SECOND TOE PROXIMAL PHALANX Tissue Tissue CULTURE, ANAEROBIC, CULTURE, TISSUE Marta Banerjee DPM 11/20/2023 1720    A : LEFT SECOND TOE PROXIMAL PHALANX Tissue Tissue SURGICAL PATHOLOGY Asaf PINO DPM 11/20/2023 1722        Implants:  * No implants in log *      Drains: * No LDAs found *    Findings: As noted below    Indications for procedure: Patient is a 58 y/o male consulted for left second toe gangrene with localized erythema. Patient noted to have severe PAD of left lower extremity. Patient needed partial amputation of toe to prevent worsening of infection and to help with healing. Vascular surgery planning angiogram on Wednesday. All risks, benefits, complications of procedure discussed in detail with patient. No guarantee made to outcome of procedure. Patient voiced understanding and signed consent. Detailed Description of Procedure: Patient brought into operating room and placed on operating table in supine position. After IV sedation from department of anesthesia local block consisting of 10 cc of 1:1 mixture of 1% lidocaine plain and 0.5% marcaine plain administered in second digital block. Left foot prepped and draped in usual sterile fashion. Attention directed to left second toe and racket shaped incision performed at level of PIPJ with # 10 blade.  Incision deepened down to bone level and PIPJ line

## 2023-11-22 NOTE — BRIEF OP NOTE
Brief Postoperative Note      Patient: Dieudonne Soto  YOB: 1960  MRN: [id number]    Date of Procedure: 11/22/2023    Pre-Op Diagnosis Codes:     * Peripheral venous insufficiency [I87.2]    Post-Op Diagnosis: Same       Procedure: Left leg angiogram, aortogram, US guided right femoral access, left SFA and popliteal 7mm balloon angioplasty alone, right femoral 6fr angioseal closure    Surgeon(s):  Kaial Caban MD    Assistant:  * No surgical staff found *    Anesthesia: IV Sedation    Estimated Blood Loss (mL): Minimal    Complications: None    Specimens:   * No specimens in log *    Implants:  * No implants in log *      Drains: * No LDAs found *    Findings:   Occluded mid popliteal (behind the knee)  Recannulated and 7mm PTA alone given the location behind the knee  2 vessel runoff with AT and PT      Electronically signed by Kaila Caban MD on 11/22/2023 at 11:06 AM

## 2023-11-23 ENCOUNTER — HOME HEALTH ADMISSION (OUTPATIENT)
Age: 63
End: 2023-11-23

## 2023-11-23 VITALS
HEIGHT: 71 IN | HEART RATE: 78 BPM | DIASTOLIC BLOOD PRESSURE: 83 MMHG | OXYGEN SATURATION: 97 % | WEIGHT: 215.2 LBS | TEMPERATURE: 98.1 F | SYSTOLIC BLOOD PRESSURE: 153 MMHG | RESPIRATION RATE: 16 BRPM | BODY MASS INDEX: 30.13 KG/M2

## 2023-11-23 PROBLEM — D72.829 LEUKOCYTOSIS: Status: RESOLVED | Noted: 2023-11-22 | Resolved: 2023-11-23

## 2023-11-23 PROBLEM — I96 GANGRENE (HCC): Status: RESOLVED | Noted: 2023-11-19 | Resolved: 2023-11-23

## 2023-11-23 PROBLEM — I96 GANGRENE OF TOE OF RIGHT FOOT (HCC): Status: RESOLVED | Noted: 2023-11-19 | Resolved: 2023-11-23

## 2023-11-23 LAB
BACTERIA SPEC CULT: ABNORMAL
BACTERIA SPEC CULT: NORMAL
GRAM STN SPEC: ABNORMAL
GRAM STN SPEC: ABNORMAL
SERVICE CMNT-IMP: ABNORMAL
SERVICE CMNT-IMP: NORMAL

## 2023-11-23 PROCEDURE — 99239 HOSP IP/OBS DSCHRG MGMT >30: CPT | Performed by: HOSPITALIST

## 2023-11-23 PROCEDURE — 2580000003 HC RX 258: Performed by: INTERNAL MEDICINE

## 2023-11-23 PROCEDURE — 97161 PT EVAL LOW COMPLEX 20 MIN: CPT

## 2023-11-23 PROCEDURE — 6370000000 HC RX 637 (ALT 250 FOR IP): Performed by: HOSPITALIST

## 2023-11-23 PROCEDURE — 97116 GAIT TRAINING THERAPY: CPT

## 2023-11-23 PROCEDURE — 6360000002 HC RX W HCPCS: Performed by: INTERNAL MEDICINE

## 2023-11-23 PROCEDURE — 6370000000 HC RX 637 (ALT 250 FOR IP): Performed by: INTERNAL MEDICINE

## 2023-11-23 RX ORDER — ASPIRIN 81 MG/1
81 TABLET ORAL DAILY
Qty: 30 TABLET | Refills: 3 | Status: SHIPPED | OUTPATIENT
Start: 2023-11-24

## 2023-11-23 RX ORDER — AMLODIPINE BESYLATE 10 MG/1
10 TABLET ORAL DAILY
Qty: 30 TABLET | Refills: 3 | Status: SHIPPED | OUTPATIENT
Start: 2023-11-23

## 2023-11-23 RX ORDER — ATORVASTATIN CALCIUM 20 MG/1
20 TABLET, FILM COATED ORAL DAILY
Qty: 30 TABLET | Refills: 3 | Status: SHIPPED | OUTPATIENT
Start: 2023-11-23

## 2023-11-23 RX ORDER — HYDRALAZINE HYDROCHLORIDE 50 MG/1
25 TABLET, FILM COATED ORAL EVERY 8 HOURS
Qty: 90 TABLET | Refills: 3 | Status: SHIPPED | OUTPATIENT
Start: 2023-11-23

## 2023-11-23 RX ORDER — LEVOFLOXACIN 500 MG/1
500 TABLET, FILM COATED ORAL DAILY
Qty: 11 TABLET | Refills: 0 | Status: SHIPPED | OUTPATIENT
Start: 2023-11-23 | End: 2023-12-04

## 2023-11-23 RX ORDER — TRAMADOL HYDROCHLORIDE 50 MG/1
50 TABLET ORAL EVERY 8 HOURS PRN
Qty: 6 TABLET | Refills: 0 | Status: SHIPPED | OUTPATIENT
Start: 2023-11-23 | End: 2023-11-26

## 2023-11-23 RX ORDER — AMOXICILLIN AND CLAVULANATE POTASSIUM 875; 125 MG/1; MG/1
1 TABLET, FILM COATED ORAL 2 TIMES DAILY
Qty: 22 TABLET | Refills: 0 | Status: SHIPPED | OUTPATIENT
Start: 2023-11-23 | End: 2023-12-04

## 2023-11-23 RX ORDER — SACCHAROMYCES BOULARDII 250 MG
250 CAPSULE ORAL 2 TIMES DAILY
Qty: 28 CAPSULE | Refills: 0 | Status: SHIPPED | OUTPATIENT
Start: 2023-11-23 | End: 2023-12-07

## 2023-11-23 RX ADMIN — SODIUM CHLORIDE, PRESERVATIVE FREE 10 ML: 5 INJECTION INTRAVENOUS at 09:14

## 2023-11-23 RX ADMIN — PIPERACILLIN AND TAZOBACTAM 3375 MG: 3; .375 INJECTION, POWDER, LYOPHILIZED, FOR SOLUTION INTRAVENOUS at 09:13

## 2023-11-23 RX ADMIN — ASPIRIN 81 MG: 81 TABLET, COATED ORAL at 09:13

## 2023-11-23 RX ADMIN — ACETAMINOPHEN 325MG 650 MG: 325 TABLET ORAL at 01:14

## 2023-11-23 RX ADMIN — ATORVASTATIN CALCIUM 20 MG: 20 TABLET, FILM COATED ORAL at 09:13

## 2023-11-23 RX ADMIN — TRAMADOL HYDROCHLORIDE 50 MG: 50 TABLET ORAL at 03:12

## 2023-11-23 RX ADMIN — AMLODIPINE BESYLATE 10 MG: 10 TABLET ORAL at 09:13

## 2023-11-23 ASSESSMENT — PAIN DESCRIPTION - ONSET: ONSET: ON-GOING

## 2023-11-23 ASSESSMENT — PAIN DESCRIPTION - LOCATION
LOCATION: TOE (COMMENT WHICH ONE);BACK
LOCATION: BACK;TOE (COMMENT WHICH ONE)

## 2023-11-23 ASSESSMENT — PAIN SCALES - GENERAL
PAINLEVEL_OUTOF10: 5
PAINLEVEL_OUTOF10: 0
PAINLEVEL_OUTOF10: 5
PAINLEVEL_OUTOF10: 3
PAINLEVEL_OUTOF10: 0

## 2023-11-23 ASSESSMENT — PAIN DESCRIPTION - DESCRIPTORS
DESCRIPTORS: SPASM;STABBING
DESCRIPTORS: ACHING

## 2023-11-23 ASSESSMENT — PAIN DESCRIPTION - FREQUENCY: FREQUENCY: INTERMITTENT

## 2023-11-23 ASSESSMENT — PAIN DESCRIPTION - ORIENTATION
ORIENTATION: LEFT
ORIENTATION: LEFT

## 2023-11-23 ASSESSMENT — PAIN DESCRIPTION - PAIN TYPE
TYPE: SURGICAL PAIN
TYPE: SURGICAL PAIN

## 2023-11-23 NOTE — PLAN OF CARE
Post Op Day 1  Problem: Pain  Goal: Verbalizes/displays adequate comfort level or baseline comfort level  Outcome: Not Progressing
Problem: ABCDS Injury Assessment  Goal: Absence of physical injury  Outcome: Progressing     Problem: Chronic Conditions and Co-morbidities  Goal: Patient's chronic conditions and co-morbidity symptoms are monitored and maintained or improved  Outcome: Progressing     Problem: Safety - Adult  Goal: Free from fall injury  Outcome: Progressing     Problem: Pain  Goal: Verbalizes/displays adequate comfort level or baseline comfort level  Outcome: Not Progressing
Problem: Pain  Goal: Verbalizes/displays adequate comfort level or baseline comfort level  Outcome: Progressing     Problem: ABCDS Injury Assessment  Goal: Absence of physical injury  Outcome: Progressing     Problem: Chronic Conditions and Co-morbidities  Goal: Patient's chronic conditions and co-morbidity symptoms are monitored and maintained or improved  Outcome: Progressing  Flowsheets (Taken 11/20/2023 7289)  Care Plan - Patient's Chronic Conditions and Co-Morbidity Symptoms are Monitored and Maintained or Improved: Monitor and assess patient's chronic conditions and comorbid symptoms for stability, deterioration, or improvement     Problem: Safety - Adult  Goal: Free from fall injury  Outcome: Progressing     Problem: Cognitive:  Goal: Knowledge of wound care  Description: Knowledge of wound care  Outcome: Progressing  Goal: Understands risk factors for wounds  Description: Understands risk factors for wounds  Outcome: Progressing     Problem: Skin/Tissue Integrity  Goal: Absence of new skin breakdown  Description: 1. Monitor for areas of redness and/or skin breakdown  2. Assess vascular access sites hourly  3. Every 4-6 hours minimum:  Change oxygen saturation probe site  4. Every 4-6 hours:  If on nasal continuous positive airway pressure, respiratory therapy assess nares and determine need for appliance change or resting period.   Outcome: Progressing     Problem: Discharge Planning  Goal: Discharge to home or other facility with appropriate resources  Outcome: Progressing
Modified independent   Toileting: Modified independent     Pain:  Pain level pre-treatment: 0/10   Pain level post-treatment: 0/10   Pain Intervention(s): Rest, Ice, Repositioning   Response to intervention: Nurse notified    Activity Tolerance:   Activity Tolerance: Patient Tolerated treatment well  Please refer to the flowsheet for vital signs taken during this treatment. After treatment:   [] Patient left in no apparent distress sitting up in chair  [x] Patient left in no apparent distress in bed  [x] Call bell left within reach  [x] Nursing notified  [] Caregiver present  [] Bed alarm activated    COMMUNICATION/EDUCATION:   Patient Education  Education Given To: Patient  Education Provided: Role of Therapy; Fall Prevention Strategies;Precautions  Education Method: Teach Back  Barriers to Learning: None  Education Outcome: Verbalized understanding    Thank you for this referral.  Vasquez Ocampo OT  Minutes: 13    Eval Complexity: Decision Making: Low Complexity
Pain level post-treatment: 0/10   Pain Intervention(s): Medication (see MAR); Rest, Ice, Repositioning  Response to intervention: Nurse notified     Activity Tolerance:   Activity Tolerance: Patient tolerated evaluation without incident  Please refer to the flowsheet for vital signs taken during this treatment. After treatment:   []         Patient left in no apparent distress sitting up in chair  [x]         Patient left in no apparent distress in bed  [x]         Call bell left within reach  [x]         Nursing notified  []         Caregiver present  []         Bed alarm activated  []         SCDs applied    COMMUNICATION/EDUCATION:   Patient Education  Education Given To: Patient  Education Provided: Role of Therapy;Plan of Care;Transfer Training;Equipment; Fall Prevention Strategies;Precautions  Education Method: Verbal;Teach Back  Barriers to Learning: None  Education Outcome: Verbalized understanding    Thank you for this referral.  Chaparrita Flores, PT  Minutes: 20      Eval Complexity: Decision Making: Low Complexity

## 2023-11-23 NOTE — CARE COORDINATION
Discharge order noted for today. Pt has been accepted to [facility] agency. Met with patient and he is agreeable to the transition plan today. Transport has been arranged through the patient. Patient's discharge summary and home health orders have been forwarded to [facility] home health  agency via [address]. Updated Charge Nurse Ashleigh, to the transition plan.   Discharge information has been documented on the AVS.       LOW Ortega, RN  Care Management  Phone: [phone]

## 2023-11-23 NOTE — DISCHARGE SUMMARY
bleeding, shortness of breath, chest pain, please call your primary care physician or return to the emergency room if you cannot get hold of your doctor:       FOLLOW UP CARE:   Follow-up Information     Jack Lopez MD Follow up in 1 month(s). Specialty: Vascular Surgery  Why: Patient will need bilateral lower extremity BERLIN/PVR imaging prior to   next visit. Contact information:  [address] [place] [address]  [phone]             TROY Saxena NP. Schedule an appointment as soon as   possible for a visit in 1 week(s). Specialty: Nurse Practitioner  Contact information:  [address] [place] [id number]  [phone]             Pino Jones DPM. Schedule an appointment as soon as possible for a   visit in 1 week(s). Specialty: Podiatry  Contact information:  [address] [facility] [address]  [phone]                       Minutes spent on discharge: 40 minutes spent coordinating this discharge (review instructions/follow-up, prescriptions, preparing report for sign off)    Marybeth Reyes MD  11/23/2023 10:22 AM    Disclaimer: Sections of this note are dictated using utilizing voice recognition software. Minor typographical errors may be present. If questions arise, please do not hesitate to contact me or call our department.

## 2023-11-23 NOTE — DISCHARGE INSTRUCTIONS
Discharge Instructions    Patient: Jomar Amni MRN: [id number]  CSN: [id number]    YOB: 1960  Age: 59 y.o.   Sex: male    DOA: 11/18/2023       DIET:  cardiac diet    ACTIVITY: activity as tolerated  Home health care for Skilled care for wound care, Hypertension and medication management       PT/OT consult      ADDITIONAL INFORMATION: If you experience any of the following symptoms but not limited to Fever, chills, nausea, vomiting, diarrhea, change in mentation, falling, bleeding, shortness of breath, chest pain, please call your primary care physician or return to the emergency room if you cannot get hold of your doctor:     FOLLOW UP CARE:      Benjamen Lanes, MD  11/23/2023 10:21 AM

## 2023-11-26 ENCOUNTER — HOME CARE VISIT (OUTPATIENT)
Age: 63
End: 2023-11-26

## 2023-12-05 ENCOUNTER — APPOINTMENT (OUTPATIENT)
Facility: HOSPITAL | Age: 63
DRG: 478 | End: 2023-12-05
Payer: MEDICARE

## 2023-12-05 ENCOUNTER — HOSPITAL ENCOUNTER (INPATIENT)
Facility: HOSPITAL | Age: 63
LOS: 5 days | Discharge: HOME HEALTH | DRG: 478 | End: 2023-12-10
Attending: STUDENT IN AN ORGANIZED HEALTH CARE EDUCATION/TRAINING PROGRAM | Admitting: STUDENT IN AN ORGANIZED HEALTH CARE EDUCATION/TRAINING PROGRAM
Payer: MEDICARE

## 2023-12-05 DIAGNOSIS — L08.9 FOOT INFECTION: ICD-10-CM

## 2023-12-05 DIAGNOSIS — M86.9 OSTEOMYELITIS OF OTHER SITE, UNSPECIFIED TYPE (HCC): ICD-10-CM

## 2023-12-05 DIAGNOSIS — L08.9 TOE INFECTION: Primary | ICD-10-CM

## 2023-12-05 PROBLEM — I10 PRIMARY HYPERTENSION: Status: ACTIVE | Noted: 2023-12-05

## 2023-12-05 LAB
ABO + RH BLD: NORMAL
ANION GAP SERPL CALC-SCNC: 3 MMOL/L (ref 3–18)
BASOPHILS # BLD: 0 K/UL (ref 0–0.1)
BASOPHILS NFR BLD: 0 % (ref 0–2)
BLOOD GROUP ANTIBODIES SERPL: NORMAL
BUN SERPL-MCNC: 15 MG/DL (ref 7–18)
BUN/CREAT SERPL: 14 (ref 12–20)
CALCIUM SERPL-MCNC: 9.4 MG/DL (ref 8.5–10.1)
CHLORIDE SERPL-SCNC: 109 MMOL/L (ref 100–111)
CO2 SERPL-SCNC: 27 MMOL/L (ref 21–32)
CREAT SERPL-MCNC: 1.04 MG/DL (ref 0.6–1.3)
DIFFERENTIAL METHOD BLD: ABNORMAL
ECHO BSA: 2.2 M2
EOSINOPHIL # BLD: 0.2 K/UL (ref 0–0.4)
EOSINOPHIL NFR BLD: 2 % (ref 0–5)
ERYTHROCYTE [DISTWIDTH] IN BLOOD BY AUTOMATED COUNT: 13.1 % (ref 11.6–14.5)
EST. AVERAGE GLUCOSE BLD GHB EST-MCNC: 117 MG/DL
GLUCOSE SERPL-MCNC: 100 MG/DL (ref 74–99)
HBA1C MFR BLD: 5.7 % (ref 4.2–5.6)
HCT VFR BLD AUTO: 41.8 % (ref 36–48)
HGB BLD-MCNC: 13.8 G/DL (ref 13–16)
IMM GRANULOCYTES # BLD AUTO: 0 K/UL (ref 0–0.04)
IMM GRANULOCYTES NFR BLD AUTO: 1 % (ref 0–0.5)
LYMPHOCYTES # BLD: 1.7 K/UL (ref 0.9–3.6)
LYMPHOCYTES NFR BLD: 21 % (ref 21–52)
MCH RBC QN AUTO: 29.4 PG (ref 24–34)
MCHC RBC AUTO-ENTMCNC: 33 G/DL (ref 31–37)
MCV RBC AUTO: 88.9 FL (ref 78–100)
MONOCYTES # BLD: 0.6 K/UL (ref 0.05–1.2)
MONOCYTES NFR BLD: 8 % (ref 3–10)
NEUTS SEG # BLD: 5.4 K/UL (ref 1.8–8)
NEUTS SEG NFR BLD: 68 % (ref 40–73)
NRBC # BLD: 0 K/UL (ref 0–0.01)
NRBC BLD-RTO: 0 PER 100 WBC
PLATELET # BLD AUTO: 264 K/UL (ref 135–420)
PMV BLD AUTO: 10.8 FL (ref 9.2–11.8)
POTASSIUM SERPL-SCNC: 4 MMOL/L (ref 3.5–5.5)
RBC # BLD AUTO: 4.7 M/UL (ref 4.35–5.65)
SODIUM SERPL-SCNC: 139 MMOL/L (ref 136–145)
SPECIMEN EXP DATE BLD: NORMAL
WBC # BLD AUTO: 7.9 K/UL (ref 4.6–13.2)

## 2023-12-05 PROCEDURE — 80048 BASIC METABOLIC PNL TOTAL CA: CPT

## 2023-12-05 PROCEDURE — 1100000000 HC RM PRIVATE

## 2023-12-05 PROCEDURE — 87205 SMEAR GRAM STAIN: CPT

## 2023-12-05 PROCEDURE — 86901 BLOOD TYPING SEROLOGIC RH(D): CPT

## 2023-12-05 PROCEDURE — 73620 X-RAY EXAM OF FOOT: CPT

## 2023-12-05 PROCEDURE — 6360000002 HC RX W HCPCS: Performed by: INTERNAL MEDICINE

## 2023-12-05 PROCEDURE — 87186 SC STD MICRODIL/AGAR DIL: CPT

## 2023-12-05 PROCEDURE — 99223 1ST HOSP IP/OBS HIGH 75: CPT | Performed by: STUDENT IN AN ORGANIZED HEALTH CARE EDUCATION/TRAINING PROGRAM

## 2023-12-05 PROCEDURE — 87154 CUL TYP ID BLD PTHGN 6+ TRGT: CPT

## 2023-12-05 PROCEDURE — 2580000003 HC RX 258: Performed by: INTERNAL MEDICINE

## 2023-12-05 PROCEDURE — 99285 EMERGENCY DEPT VISIT HI MDM: CPT

## 2023-12-05 PROCEDURE — 86850 RBC ANTIBODY SCREEN: CPT

## 2023-12-05 PROCEDURE — 2580000003 HC RX 258: Performed by: PHYSICIAN ASSISTANT

## 2023-12-05 PROCEDURE — 86900 BLOOD TYPING SEROLOGIC ABO: CPT

## 2023-12-05 PROCEDURE — 96365 THER/PROPH/DIAG IV INF INIT: CPT

## 2023-12-05 PROCEDURE — 85025 COMPLETE CBC W/AUTO DIFF WBC: CPT

## 2023-12-05 PROCEDURE — 6360000002 HC RX W HCPCS: Performed by: PHYSICIAN ASSISTANT

## 2023-12-05 PROCEDURE — 96374 THER/PROPH/DIAG INJ IV PUSH: CPT

## 2023-12-05 PROCEDURE — 87070 CULTURE OTHR SPECIMN AEROBIC: CPT

## 2023-12-05 PROCEDURE — 87040 BLOOD CULTURE FOR BACTERIA: CPT

## 2023-12-05 PROCEDURE — 6370000000 HC RX 637 (ALT 250 FOR IP): Performed by: INTERNAL MEDICINE

## 2023-12-05 PROCEDURE — 87147 CULTURE TYPE IMMUNOLOGIC: CPT

## 2023-12-05 PROCEDURE — 87077 CULTURE AEROBIC IDENTIFY: CPT

## 2023-12-05 PROCEDURE — 83036 HEMOGLOBIN GLYCOSYLATED A1C: CPT

## 2023-12-05 RX ORDER — DIPHENHYDRAMINE HYDROCHLORIDE 50 MG/ML
12.5 INJECTION INTRAMUSCULAR; INTRAVENOUS
Status: COMPLETED | OUTPATIENT
Start: 2023-12-05 | End: 2023-12-05

## 2023-12-05 RX ORDER — METRONIDAZOLE 500 MG/100ML
500 INJECTION, SOLUTION INTRAVENOUS EVERY 12 HOURS
Status: DISCONTINUED | OUTPATIENT
Start: 2023-12-05 | End: 2023-12-10 | Stop reason: HOSPADM

## 2023-12-05 RX ORDER — TRAMADOL HYDROCHLORIDE 50 MG/1
25 TABLET ORAL ONCE
Status: COMPLETED | OUTPATIENT
Start: 2023-12-05 | End: 2023-12-05

## 2023-12-05 RX ORDER — AMLODIPINE BESYLATE 10 MG/1
10 TABLET ORAL DAILY
Status: DISCONTINUED | OUTPATIENT
Start: 2023-12-05 | End: 2023-12-10 | Stop reason: HOSPADM

## 2023-12-05 RX ORDER — ATORVASTATIN CALCIUM 20 MG/1
20 TABLET, FILM COATED ORAL DAILY
Status: DISCONTINUED | OUTPATIENT
Start: 2023-12-05 | End: 2023-12-10 | Stop reason: HOSPADM

## 2023-12-05 RX ORDER — LACTOBACILLUS RHAMNOSUS GG 10B CELL
1 CAPSULE ORAL
Status: DISCONTINUED | OUTPATIENT
Start: 2023-12-06 | End: 2023-12-10 | Stop reason: HOSPADM

## 2023-12-05 RX ORDER — HYDRALAZINE HYDROCHLORIDE 25 MG/1
25 TABLET, FILM COATED ORAL EVERY 8 HOURS
Status: DISCONTINUED | OUTPATIENT
Start: 2023-12-05 | End: 2023-12-10 | Stop reason: HOSPADM

## 2023-12-05 RX ORDER — SACCHAROMYCES BOULARDII 250 MG
250 CAPSULE ORAL 2 TIMES DAILY
Status: DISCONTINUED | OUTPATIENT
Start: 2023-12-05 | End: 2023-12-05 | Stop reason: SDUPTHER

## 2023-12-05 RX ADMIN — PIPERACILLIN AND TAZOBACTAM 4500 MG: 4; .5 INJECTION, POWDER, FOR SOLUTION INTRAVENOUS at 12:18

## 2023-12-05 RX ADMIN — METRONIDAZOLE 500 MG: 500 INJECTION, SOLUTION INTRAVENOUS at 16:11

## 2023-12-05 RX ADMIN — CEFEPIME 2000 MG: 2 INJECTION, POWDER, FOR SOLUTION INTRAVENOUS at 16:10

## 2023-12-05 RX ADMIN — TRAMADOL HYDROCHLORIDE 25 MG: 50 TABLET ORAL at 21:48

## 2023-12-05 RX ADMIN — DIPHENHYDRAMINE HYDROCHLORIDE 12.5 MG: 50 INJECTION INTRAMUSCULAR; INTRAVENOUS at 13:45

## 2023-12-05 ASSESSMENT — PAIN SCALES - GENERAL
PAINLEVEL_OUTOF10: 2
PAINLEVEL_OUTOF10: 8
PAINLEVEL_OUTOF10: 2
PAINLEVEL_OUTOF10: 2
PAINLEVEL_OUTOF10: 0
PAINLEVEL_OUTOF10: 0

## 2023-12-05 ASSESSMENT — PAIN DESCRIPTION - ORIENTATION
ORIENTATION: LEFT
ORIENTATION: LEFT

## 2023-12-05 ASSESSMENT — PAIN DESCRIPTION - DESCRIPTORS: DESCRIPTORS: BURNING

## 2023-12-05 ASSESSMENT — ENCOUNTER SYMPTOMS
NAUSEA: 0
SHORTNESS OF BREATH: 0
DIARRHEA: 0
VOMITING: 0
ABDOMINAL PAIN: 0

## 2023-12-05 ASSESSMENT — PAIN DESCRIPTION - LOCATION
LOCATION: TOE (COMMENT WHICH ONE)
LOCATION: TOE (COMMENT WHICH ONE)

## 2023-12-05 ASSESSMENT — PAIN - FUNCTIONAL ASSESSMENT: PAIN_FUNCTIONAL_ASSESSMENT: 0-10

## 2023-12-05 NOTE — ED NOTES
Pt noted to be sneezing and coughing loudly shortly after vancomycin administration. RN asked pt if he was alright, pt states \"all of the sudden I feel like I'm having a sinus reaction, It might be the medicine\".      RN stopped vancomycin and informed MD.     Dr. Cristiane Francis and Dr. Yanni Meraz assessed pt, Dr. Yanni Meraz gave RN verbal order to continue vancomycin as he does not believe pt Is having an allergic reaction to the meds    Vitals taken upon reaction are as follows   137/75  98% O2  18 RR  106 bpm      Rhett Oleary RN  12/05/23 1340

## 2023-12-05 NOTE — CONSULTS
Infectious Disease Consultation Note  (Consult requested by dr. Robi Lezama)      Reason: Left foot infection    Current abx Prior abx   Zosyn, vancomycin       Lines:       Assessment :  67-year-old man with past medical history significant for obesity, upper extremity cellulitis 6 years ago, arthritis admitted to [facility] on 12/5/2023 for surgical intervention left foot. Hospitalization at [facility] May 2021 for acute gangrenous cellulitis of both legs- infection superimposed on underlying vasculitis. Wound culture 5/7- pseudomonas putida, enterococcus, group B streptococcus, Staphylococcus intermedius-susceptibilities reviewed   Status post skin biopsy of lower extremity ulcer on 5/7/2021- afb stain negative. Fungal cultures, histology suggestive of neutrophilic vasculitis. Negative fungal/AFB stain, cultures 5/7/2021    Hospitalization at [facility] 11/2023 for left second toe distal tip gangrene with cellulitis status post left second toe partial amputation at proximal interphalangeal joint. IntraOp cultures 11/20-Morganella, methicillin susceptible Staphylococcus aureus, coagulase-negative Staphylococcus  IntraOp findings discussed with podiatrist.  Grossly clean bone margins achieved at surgery. Low clinical probability of osteomyelitis/bone infection    Now with gangrene left second toe amputation stump with foul smelling drainage    Clinical presentation consistent with left second toe amputation stump cellulitis, gangrene    Recurrent infection likely secondary to microvascular disease, inability to get outpatient antibiotics in a timely fashion (patient states that he was not able to get oral antibiotics after about 5 days after his recent hospital discharge due to pharmacy issues)    Peripheral arterial disease: Vascular surgery evaluation appreciated. Status post left SFA/popliteal balloon angioplasty   On 11/22/2023    Recommendations:     1. Discontinue piperacillin/tazobactam, vancomycin.   Start IV

## 2023-12-05 NOTE — ED NOTES
Pt arrived to ED with note from his dr, MARINO MckinneyPNARINDER, Susan Kat. Note states \"please admit with gangrene and infection of second toe, NPO after midnight\"     Pt placed in hallway bed C, side rails up x2, NAD noted or voiced. Respirations are even, equal and unlabored. No signs of cardiopulmonary distress noted.  A&Ox4, ambulatory       Ember Salas RN  12/05/23 1209

## 2023-12-05 NOTE — ED PROVIDER NOTES
EMERGENCY DEPARTMENT HISTORY AND PHYSICAL EXAM    2:03 PM      Date: 12/5/2023  Patient Name: Keny Trujillo    History of Presenting Illness     Chief Complaint   Patient presents with    Surgery admission        History Provided By: Patient    Additional History (Context): Keny Trujillo is a 59 y.o. male with {  Past Medical History:   Diagnosis Date    Arthritis     Cellulitis 04/13/2021    legs    Hypercholesterolemia     Hypertension    who presents with complaint of left second toe pain and wound x 3 weeks. Pt notes he is currently on Amoxicillin BID. Pt notes Dr. Nhan Guillen sent him to the ED for admission and OR tomorrow. Pt denies fever/chills, drainage from site. Unsure if he is diabetic. Denies blood thinner use. PCP: TROY Gonzales NP    Current Facility-Administered Medications   Medication Dose Route Frequency Provider Last Rate Last Admin    vancomycin (VANCOCIN) 2000 mg in 0.9% sodium chloride 500 mL IVPB  2,000 mg IntraVENous Once [address] 250 mL/hr at 12/05/23 1327 2,000 mg at 12/05/23 1327    amLODIPine (NORVASC) tablet 10 mg  10 mg Oral Daily Danette Amin, DO        atorvastatin (LIPITOR) tablet 20 mg  20 mg Oral Daily Danette Amin,         hydrALAZINE (APRESOLINE) tablet 25 mg  25 mg Oral Q8H Danette Amin DO        [START ON 12/6/2023] lactobacillus (CULTURELLE) capsule 1 capsule  1 capsule Oral Daily with breakfast Elie Marie DO         Current Outpatient Medications   Medication Sig Dispense Refill    aspirin 81 MG EC tablet Take 1 tablet by mouth daily 30 tablet 3    atorvastatin (LIPITOR) 20 MG tablet Take 1 tablet by mouth daily 30 tablet 3    amLODIPine (NORVASC) 10 MG tablet Take 1 tablet by mouth daily 30 tablet 3    hydrALAZINE (APRESOLINE) 50 MG tablet Take 0.5 tablets by mouth in the morning and 0.5 tablets at noon and 0.5 tablets in the evening.  90 tablet 3    saccharomyces boulardii (FLORASTOR) 250 MG capsule Take 1 capsule by

## 2023-12-05 NOTE — CONSULTS
[unfilled]   Consult    Patient: Nemo Lamas MRN: [id number]  SSN: [id number]    YOB: 1960  Age: 59 y.o. Sex: male      Subjective:      Nemo Lamas is a 59 y.o.   male who is being seen for Consulted. in office earlier when patient was referred  For second opinion has had partial toe amputation two weeks ago  followed two days after with vascular intervention. Has recurrent infection left second tow partial amputation site. Past Medical History:   Diagnosis Date    Arthritis     Cellulitis 2021    legs    Hypercholesterolemia     Hypertension      Past Surgical History:   Procedure Laterality Date    INVASIVE VASCULAR N/A 2023    Angiography lower ext left performed by Eduard Morejon MD at [address] [facility] CATH [facility]    INVASIVE VASCULAR N/A 2023    Pta femoral popliteal artery performed by Eduard Morejon MD at [address] [facility] CATH [facility]    INVASIVE VASCULAR N/A 2023    Aortagram abdominal performed by Eduard Morejon MD at [address] [facility] CATH [facility]    ORTHOPEDIC SURGERY      TOE AMPUTATION Left 2023    LEFT SECOND TOE AMPUTATION performed by Arlene Hawkins DPM at [facility] MAIN OR      Family History   Problem Relation Age of Onset    Diabetes Maternal Grandmother     No Known Problems Father     Diabetes Mother      Social History     Tobacco Use    Smoking status: Former     Types: Cigarettes     Quit date: 3/26/1998     Years since quittin.7    Smokeless tobacco: Never    Tobacco comments:     Quit smoking: AROUND WIFE WHO SMOKES/2ND HAND SMOKE   Substance Use Topics    Alcohol use:  Yes     Alcohol/week: 18.0 standard drinks of alcohol      Current Facility-Administered Medications   Medication Dose Route Frequency Provider Last Rate Last Admin    amLODIPine (NORVASC) tablet 10 mg  10 mg Oral Daily Danette Amin, DO        atorvastatin (LIPITOR) tablet 20 mg  20 mg Oral Daily Danette Amin N, DO        hydrALAZINE (APRESOLINE) tablet 25 mg

## 2023-12-05 NOTE — H&P (VIEW-ONLY)
[unfilled]   Consult    Patient: Ermelinda Espinal MRN: [id number]  SSN: [id number]    YOB: 1960  Age: 59 y.o. Sex: male      Subjective:      Ermelinda Espinal is a 59 y.o. male who is being seen for Consulted. in office earlier when patient was referred  For second opinion has had partial toe amputation two weeks ago  followed two days after with vascular intervention. Has recurrent infection left second tow partial amputation site. Past Medical History:   Diagnosis Date    Arthritis     Cellulitis 2021    legs    Hypercholesterolemia     Hypertension      Past Surgical History:   Procedure Laterality Date    INVASIVE VASCULAR N/A 2023    Angiography lower ext left performed by Buster Crespo MD at [address] [place] CATH [facility]    INVASIVE VASCULAR N/A 2023    Pta femoral popliteal artery performed by Buster Crespo MD at [address] [place] CATH [facility]    INVASIVE VASCULAR N/A 2023    Aortagram abdominal performed by Buster Crespo MD at [address] [place] CATH [facility]    ORTHOPEDIC SURGERY      TOE AMPUTATION Left 2023    LEFT SECOND TOE AMPUTATION performed by Harvinder Brewer DPM at [facility] MAIN OR      Family History   Problem Relation Age of Onset    Diabetes Maternal Grandmother     No Known Problems Father     Diabetes Mother      Social History     Tobacco Use    Smoking status: Former     Types: Cigarettes     Quit date: 3/26/1998     Years since quittin.7    Smokeless tobacco: Never    Tobacco comments:     Quit smoking: AROUND WIFE WHO SMOKES/2ND HAND SMOKE   Substance Use Topics    Alcohol use: Yes     Alcohol/week: 18.0 standard drinks of alcohol      No current facility-administered medications for this encounter.      Current Outpatient Medications   Medication Sig Dispense Refill    aspirin 81 MG EC tablet Take 1 tablet by mouth daily 30 tablet 3    atorvastatin (LIPITOR) 20 MG tablet Take 1 tablet by mouth daily 30 tablet 3    amLODIPine (NORVASC) 10 MG

## 2023-12-05 NOTE — ED NOTES
TRANSFER - OUT REPORT:    Verbal report given to [facility] [place] on Judith Isaac  being transferred to  for routine progression of patient care       Report consisted of patient's Situation, Background, Assessment and   Recommendations(SBAR). Information from the following report(s) Nurse Handoff Report, ED Encounter Summary, ED SBAR, MAR, Recent Results, and Med Rec Status was reviewed with the receiving nurse. [place] Assessment:    Presents to emergency department  because of falls (Syncope, seizure, or loss of consciousness): No  Age > 70: No  Altered Mental Status, Intoxication with alcohol or substance confusion (Disorientation, impaired judgment, poor safety awaremess, or inability to follow instructions): No  Impaired Mobility: Ambulates or transfers with assistive devices or assistance; Unable to ambulate or transer.: Yes  Nursing Judgement: Yes          Lines:   Peripheral IV 12/05/23 Right Antecubital (Active)       Peripheral IV 12/05/23 Distal;Left; Anterior Cephalic (Active)        Opportunity for questions and clarification was provided.                Yadira Sharma RN  12/05/23 3544

## 2023-12-05 NOTE — ED NOTES
Pt provide with meal tray and drink. NAD voiced or noted.  Vancomycin still infusing      Piedad Keith RN  12/05/23 2506

## 2023-12-05 NOTE — ED TRIAGE NOTES
Pt states he was sent here by doctor for L great toe amputation. States he was dx w/ gangrene and had partial amputation but now needs further amputation. [facility]  [phone]

## 2023-12-06 ENCOUNTER — ANESTHESIA EVENT (OUTPATIENT)
Facility: HOSPITAL | Age: 63
End: 2023-12-06
Payer: MEDICARE

## 2023-12-06 ENCOUNTER — ANESTHESIA (OUTPATIENT)
Facility: HOSPITAL | Age: 63
End: 2023-12-06
Payer: MEDICARE

## 2023-12-06 LAB
ACCESSION NUMBER, LLC1M: NORMAL
ACINETOBACTER CALCOAC BAUMANNII COMPLEX BY PCR: NOT DETECTED
ANION GAP SERPL CALC-SCNC: 4 MMOL/L (ref 3–18)
B FRAGILIS DNA BLD POS QL NAA+NON-PROBE: NOT DETECTED
BASOPHILS # BLD: 0.1 K/UL (ref 0–0.1)
BASOPHILS NFR BLD: 1 % (ref 0–2)
BIOFIRE TEST COMMENT: NORMAL
BUN SERPL-MCNC: 15 MG/DL (ref 7–18)
BUN/CREAT SERPL: 16 (ref 12–20)
C ALBICANS DNA BLD POS QL NAA+NON-PROBE: NOT DETECTED
C AURIS DNA BLD POS QL NAA+NON-PROBE: NOT DETECTED
C GATTII+NEOFOR DNA BLD POS QL NAA+N-PRB: NOT DETECTED
C GLABRATA DNA BLD POS QL NAA+NON-PROBE: NOT DETECTED
C KRUSEI DNA BLD POS QL NAA+NON-PROBE: NOT DETECTED
C PARAP DNA BLD POS QL NAA+NON-PROBE: NOT DETECTED
C TROPICLS DNA BLD POS QL NAA+NON-PROBE: NOT DETECTED
CALCIUM SERPL-MCNC: 8.4 MG/DL (ref 8.5–10.1)
CHLORIDE SERPL-SCNC: 111 MMOL/L (ref 100–111)
CO2 SERPL-SCNC: 25 MMOL/L (ref 21–32)
CREAT SERPL-MCNC: 0.96 MG/DL (ref 0.6–1.3)
DIFFERENTIAL METHOD BLD: ABNORMAL
E CLOAC COMP DNA BLD POS NAA+NON-PROBE: NOT DETECTED
E COLI DNA BLD POS QL NAA+NON-PROBE: NOT DETECTED
E FAECALIS DNA BLD POS QL NAA+NON-PROBE: NOT DETECTED
E FAECIUM DNA BLD POS QL NAA+NON-PROBE: NOT DETECTED
ENTEROBACTERALES DNA BLD POS NAA+N-PRB: NOT DETECTED
EOSINOPHIL # BLD: 0.2 K/UL (ref 0–0.4)
EOSINOPHIL NFR BLD: 3 % (ref 0–5)
ERYTHROCYTE [DISTWIDTH] IN BLOOD BY AUTOMATED COUNT: 13 % (ref 11.6–14.5)
GLUCOSE SERPL-MCNC: 104 MG/DL (ref 74–99)
GP B STREP DNA BLD POS QL NAA+NON-PROBE: NOT DETECTED
HAEM INFLU DNA BLD POS QL NAA+NON-PROBE: NOT DETECTED
HCT VFR BLD AUTO: 36 % (ref 36–48)
HGB BLD-MCNC: 12 G/DL (ref 13–16)
IMM GRANULOCYTES # BLD AUTO: 0.1 K/UL (ref 0–0.04)
IMM GRANULOCYTES NFR BLD AUTO: 1 % (ref 0–0.5)
K OXYTOCA DNA BLD POS QL NAA+NON-PROBE: NOT DETECTED
KLEBSIELLA SP DNA BLD POS QL NAA+NON-PRB: NOT DETECTED
KLEBSIELLA SP DNA BLD POS QL NAA+NON-PRB: NOT DETECTED
L MONOCYTOG DNA BLD POS QL NAA+NON-PROBE: NOT DETECTED
LYMPHOCYTES # BLD: 1 K/UL (ref 0.9–3.6)
LYMPHOCYTES NFR BLD: 12 % (ref 21–52)
MCH RBC QN AUTO: 29.6 PG (ref 24–34)
MCHC RBC AUTO-ENTMCNC: 33.3 G/DL (ref 31–37)
MCV RBC AUTO: 88.9 FL (ref 78–100)
MONOCYTES # BLD: 0.5 K/UL (ref 0.05–1.2)
MONOCYTES NFR BLD: 6 % (ref 3–10)
N MEN DNA BLD POS QL NAA+NON-PROBE: NOT DETECTED
NEUTS SEG # BLD: 6.6 K/UL (ref 1.8–8)
NEUTS SEG NFR BLD: 78 % (ref 40–73)
NRBC # BLD: 0 K/UL (ref 0–0.01)
NRBC BLD-RTO: 0 PER 100 WBC
P AERUGINOSA DNA BLD POS NAA+NON-PROBE: NOT DETECTED
PLATELET # BLD AUTO: 257 K/UL (ref 135–420)
PMV BLD AUTO: 11.2 FL (ref 9.2–11.8)
POTASSIUM SERPL-SCNC: 3.8 MMOL/L (ref 3.5–5.5)
PROTEUS SP DNA BLD POS QL NAA+NON-PROBE: NOT DETECTED
RBC # BLD AUTO: 4.05 M/UL (ref 4.35–5.65)
RESISTANT GENE TARGETS: NORMAL
S AUREUS DNA BLD POS QL NAA+NON-PROBE: NOT DETECTED
S AUREUS+CONS DNA BLD POS NAA+NON-PROBE: NOT DETECTED
S EPIDERMIDIS DNA BLD POS QL NAA+NON-PRB: NOT DETECTED
S LUGDUNENSIS DNA BLD POS QL NAA+NON-PRB: NOT DETECTED
S MALTOPHILIA DNA BLD POS QL NAA+NON-PRB: NOT DETECTED
S MARCESCENS DNA BLD POS NAA+NON-PROBE: NOT DETECTED
S PNEUM DNA BLD POS QL NAA+NON-PROBE: NOT DETECTED
S PYO DNA BLD POS QL NAA+NON-PROBE: NOT DETECTED
SALMONELLA DNA BLD POS QL NAA+NON-PROBE: NOT DETECTED
SODIUM SERPL-SCNC: 140 MMOL/L (ref 136–145)
STREPTOCOCCUS DNA BLD POS NAA+NON-PROBE: NOT DETECTED
WBC # BLD AUTO: 8.5 K/UL (ref 4.6–13.2)

## 2023-12-06 PROCEDURE — 3600000012 HC SURGERY LEVEL 2 ADDTL 15MIN: Performed by: PODIATRIST

## 2023-12-06 PROCEDURE — 87070 CULTURE OTHR SPECIMN AEROBIC: CPT

## 2023-12-06 PROCEDURE — 2709999900 HC NON-CHARGEABLE SUPPLY: Performed by: PODIATRIST

## 2023-12-06 PROCEDURE — 88311 DECALCIFY TISSUE: CPT

## 2023-12-06 PROCEDURE — 1100000000 HC RM PRIVATE

## 2023-12-06 PROCEDURE — 2580000003 HC RX 258: Performed by: PODIATRIST

## 2023-12-06 PROCEDURE — 2500000003 HC RX 250 WO HCPCS: Performed by: NURSE ANESTHETIST, CERTIFIED REGISTERED

## 2023-12-06 PROCEDURE — 6360000002 HC RX W HCPCS: Performed by: NURSE ANESTHETIST, CERTIFIED REGISTERED

## 2023-12-06 PROCEDURE — 80048 BASIC METABOLIC PNL TOTAL CA: CPT

## 2023-12-06 PROCEDURE — 2580000003 HC RX 258: Performed by: INTERNAL MEDICINE

## 2023-12-06 PROCEDURE — 2500000003 HC RX 250 WO HCPCS: Performed by: PODIATRIST

## 2023-12-06 PROCEDURE — 7100000000 HC PACU RECOVERY - FIRST 15 MIN: Performed by: PODIATRIST

## 2023-12-06 PROCEDURE — 6360000002 HC RX W HCPCS: Performed by: INTERNAL MEDICINE

## 2023-12-06 PROCEDURE — 36415 COLL VENOUS BLD VENIPUNCTURE: CPT

## 2023-12-06 PROCEDURE — 0QBP0ZX EXCISION OF LEFT METATARSAL, OPEN APPROACH, DIAGNOSTIC: ICD-10-PCS | Performed by: PODIATRIST

## 2023-12-06 PROCEDURE — 7100000001 HC PACU RECOVERY - ADDTL 15 MIN: Performed by: PODIATRIST

## 2023-12-06 PROCEDURE — 3700000001 HC ADD 15 MINUTES (ANESTHESIA): Performed by: PODIATRIST

## 2023-12-06 PROCEDURE — 88305 TISSUE EXAM BY PATHOLOGIST: CPT

## 2023-12-06 PROCEDURE — 3600000002 HC SURGERY LEVEL 2 BASE: Performed by: PODIATRIST

## 2023-12-06 PROCEDURE — 3700000000 HC ANESTHESIA ATTENDED CARE: Performed by: PODIATRIST

## 2023-12-06 PROCEDURE — 85025 COMPLETE CBC W/AUTO DIFF WBC: CPT

## 2023-12-06 PROCEDURE — 94761 N-INVAS EAR/PLS OXIMETRY MLT: CPT

## 2023-12-06 PROCEDURE — 87075 CULTR BACTERIA EXCEPT BLOOD: CPT

## 2023-12-06 PROCEDURE — 6370000000 HC RX 637 (ALT 250 FOR IP): Performed by: STUDENT IN AN ORGANIZED HEALTH CARE EDUCATION/TRAINING PROGRAM

## 2023-12-06 PROCEDURE — 99232 SBSQ HOSP IP/OBS MODERATE 35: CPT | Performed by: STUDENT IN AN ORGANIZED HEALTH CARE EDUCATION/TRAINING PROGRAM

## 2023-12-06 PROCEDURE — 0Y6S0Z0 DETACHMENT AT LEFT 2ND TOE, COMPLETE, OPEN APPROACH: ICD-10-PCS | Performed by: PODIATRIST

## 2023-12-06 PROCEDURE — 87205 SMEAR GRAM STAIN: CPT

## 2023-12-06 RX ORDER — PROPOFOL 10 MG/ML
INJECTION, EMULSION INTRAVENOUS PRN
Status: DISCONTINUED | OUTPATIENT
Start: 2023-12-06 | End: 2023-12-06 | Stop reason: SDUPTHER

## 2023-12-06 RX ORDER — PROCHLORPERAZINE EDISYLATE 5 MG/ML
10 INJECTION INTRAMUSCULAR; INTRAVENOUS
Status: DISCONTINUED | OUTPATIENT
Start: 2023-12-06 | End: 2023-12-06 | Stop reason: HOSPADM

## 2023-12-06 RX ORDER — LIDOCAINE HYDROCHLORIDE 20 MG/ML
INJECTION, SOLUTION EPIDURAL; INFILTRATION; INTRACAUDAL; PERINEURAL PRN
Status: DISCONTINUED | OUTPATIENT
Start: 2023-12-06 | End: 2023-12-06 | Stop reason: SDUPTHER

## 2023-12-06 RX ORDER — ONDANSETRON 2 MG/ML
4 INJECTION INTRAMUSCULAR; INTRAVENOUS
Status: DISCONTINUED | OUTPATIENT
Start: 2023-12-06 | End: 2023-12-06 | Stop reason: HOSPADM

## 2023-12-06 RX ORDER — FENTANYL CITRATE 50 UG/ML
INJECTION, SOLUTION INTRAMUSCULAR; INTRAVENOUS PRN
Status: DISCONTINUED | OUTPATIENT
Start: 2023-12-06 | End: 2023-12-06 | Stop reason: SDUPTHER

## 2023-12-06 RX ORDER — OXYCODONE HYDROCHLORIDE 5 MG/1
5 TABLET ORAL EVERY 4 HOURS PRN
Status: DISCONTINUED | OUTPATIENT
Start: 2023-12-06 | End: 2023-12-07

## 2023-12-06 RX ORDER — MORPHINE SULFATE 2 MG/ML
2 INJECTION, SOLUTION INTRAMUSCULAR; INTRAVENOUS ONCE
Status: COMPLETED | OUTPATIENT
Start: 2023-12-06 | End: 2023-12-06

## 2023-12-06 RX ORDER — SODIUM CHLORIDE, SODIUM LACTATE, POTASSIUM CHLORIDE, CALCIUM CHLORIDE 600; 310; 30; 20 MG/100ML; MG/100ML; MG/100ML; MG/100ML
INJECTION, SOLUTION INTRAVENOUS CONTINUOUS
Status: DISCONTINUED | OUTPATIENT
Start: 2023-12-06 | End: 2023-12-06 | Stop reason: HOSPADM

## 2023-12-06 RX ORDER — MIDAZOLAM HYDROCHLORIDE 1 MG/ML
INJECTION INTRAMUSCULAR; INTRAVENOUS PRN
Status: DISCONTINUED | OUTPATIENT
Start: 2023-12-06 | End: 2023-12-06 | Stop reason: SDUPTHER

## 2023-12-06 RX ORDER — SODIUM CHLORIDE 9 MG/ML
INJECTION, SOLUTION INTRAVENOUS CONTINUOUS
Status: DISCONTINUED | OUTPATIENT
Start: 2023-12-06 | End: 2023-12-07

## 2023-12-06 RX ORDER — FENTANYL CITRATE 50 UG/ML
50 INJECTION, SOLUTION INTRAMUSCULAR; INTRAVENOUS EVERY 5 MIN PRN
Status: DISCONTINUED | OUTPATIENT
Start: 2023-12-06 | End: 2023-12-06 | Stop reason: HOSPADM

## 2023-12-06 RX ADMIN — METRONIDAZOLE 500 MG: 500 INJECTION, SOLUTION INTRAVENOUS at 03:39

## 2023-12-06 RX ADMIN — PROPOFOL 120 MCG/KG/MIN: 10 INJECTION, EMULSION INTRAVENOUS at 16:23

## 2023-12-06 RX ADMIN — METRONIDAZOLE 500 MG: 500 INJECTION, SOLUTION INTRAVENOUS at 15:28

## 2023-12-06 RX ADMIN — FENTANYL CITRATE 25 MCG: 50 INJECTION INTRAMUSCULAR; INTRAVENOUS at 16:23

## 2023-12-06 RX ADMIN — OXYCODONE HYDROCHLORIDE 5 MG: 5 TABLET ORAL at 20:45

## 2023-12-06 RX ADMIN — FENTANYL CITRATE 50 MCG: 50 INJECTION INTRAMUSCULAR; INTRAVENOUS at 16:50

## 2023-12-06 RX ADMIN — CEFEPIME 2000 MG: 2 INJECTION, POWDER, FOR SOLUTION INTRAVENOUS at 08:17

## 2023-12-06 RX ADMIN — MIDAZOLAM 2 MG: 1 INJECTION, SOLUTION INTRAMUSCULAR; INTRAVENOUS at 16:12

## 2023-12-06 RX ADMIN — FENTANYL CITRATE 25 MCG: 50 INJECTION INTRAMUSCULAR; INTRAVENOUS at 16:20

## 2023-12-06 RX ADMIN — SODIUM CHLORIDE: 9 INJECTION, SOLUTION INTRAVENOUS at 16:42

## 2023-12-06 RX ADMIN — CEFEPIME 2000 MG: 2 INJECTION, POWDER, FOR SOLUTION INTRAVENOUS at 16:35

## 2023-12-06 RX ADMIN — PROPOFOL 50 MG: 10 INJECTION, EMULSION INTRAVENOUS at 16:20

## 2023-12-06 RX ADMIN — PROPOFOL 20 MG: 10 INJECTION, EMULSION INTRAVENOUS at 16:25

## 2023-12-06 RX ADMIN — SODIUM CHLORIDE: 9 INJECTION, SOLUTION INTRAVENOUS at 14:50

## 2023-12-06 RX ADMIN — LIDOCAINE HYDROCHLORIDE 80 MG: 20 INJECTION, SOLUTION EPIDURAL; INFILTRATION; INTRACAUDAL; PERINEURAL at 16:20

## 2023-12-06 RX ADMIN — MORPHINE SULFATE 2 MG: 2 INJECTION, SOLUTION INTRAMUSCULAR; INTRAVENOUS at 04:54

## 2023-12-06 RX ADMIN — CEFEPIME 2000 MG: 2 INJECTION, POWDER, FOR SOLUTION INTRAVENOUS at 00:25

## 2023-12-06 ASSESSMENT — ENCOUNTER SYMPTOMS
PHOTOPHOBIA: 0
DIARRHEA: 0
EYE PAIN: 0
COUGH: 0
WHEEZING: 0
NAUSEA: 0
SORE THROAT: 0
HEMATOCHEZIA: 0
ABDOMINAL PAIN: 0
SHORTNESS OF BREATH: 0
VOMITING: 0
CONSTIPATION: 0
COLOR CHANGE: 0

## 2023-12-06 ASSESSMENT — PAIN DESCRIPTION - DESCRIPTORS
DESCRIPTORS: SHARP
DESCRIPTORS: ACHING

## 2023-12-06 ASSESSMENT — PAIN SCALES - WONG BAKER
WONGBAKER_NUMERICALRESPONSE: 0
WONGBAKER_NUMERICALRESPONSE: 0

## 2023-12-06 ASSESSMENT — PAIN SCALES - GENERAL
PAINLEVEL_OUTOF10: 2
PAINLEVEL_OUTOF10: 8
PAINLEVEL_OUTOF10: 8
PAINLEVEL_OUTOF10: 9
PAINLEVEL_OUTOF10: 0

## 2023-12-06 ASSESSMENT — PAIN - FUNCTIONAL ASSESSMENT: PAIN_FUNCTIONAL_ASSESSMENT: 0-10

## 2023-12-06 ASSESSMENT — PAIN DESCRIPTION - ORIENTATION
ORIENTATION: LEFT

## 2023-12-06 ASSESSMENT — PAIN DESCRIPTION - LOCATION
LOCATION: TOE (COMMENT WHICH ONE)

## 2023-12-06 NOTE — ANESTHESIA POSTPROCEDURE EVALUATION
Department of Anesthesiology  Postprocedure Note    Patient: Donovan Weir  MRN: [id number]  YOB: 1960  Date of evaluation: 12/6/2023      Procedure Summary       Date: 12/06/23 Room / Location: [facility] MAIN 05 / [facility] MAIN OR    Anesthesia Start: 5242 Anesthesia Stop: 6514    Procedure: LEFT 2ND TOE AMPUTATION (Left: Toes) Diagnosis:       Osteomyelitis of other site, unspecified type ([address])      (Osteomyelitis of other site, unspecified type ([address]) [M86.9])    Surgeons: Zane Loera DPM Responsible Provider: Jennifer Hilliard MD    Anesthesia Type: MAC ASA Status: 2            Anesthesia Type: MAC    Fread Phase I:      Freda Phase II:        Anesthesia Post Evaluation    Patient location during evaluation: PACU  Patient participation: complete - patient participated  Level of consciousness: sleepy but conscious  Pain score: 0  Airway patency: patent  Nausea & Vomiting: no nausea and no vomiting  Complications: no  Cardiovascular status: blood pressure returned to baseline  Respiratory status: acceptable  Hydration status: euvolemic  Pain management: adequate

## 2023-12-06 NOTE — CARE COORDINATION
Case Management Assessment  Initial Evaluation    Date/Time of Evaluation: 12/6/2023 8:46 AM  Assessment Completed by: Nano Santillan RN    If patient is discharged prior to next notation, then this note serves as note for discharge by case management. Patient Name: Anderson Shahid                   YOB: 1960  Diagnosis: Foot infection [L08.9]  Toe infection [L08.9]                   Date / Time: 12/5/2023 11:38 AM    Patient Admission Status: Inpatient   Readmission Risk (Low < 19, Mod (19-27), High > 27): Readmission Risk Score: 11.5    Current PCP: TROY Sheikh NP  PCP verified by CM? (P) Yes    Chart Reviewed: Yes      History Provided by: (P) Patient  Patient Orientation: (P) Alert and Oriented    Patient Cognition: (P) Alert    Hospitalization in the last 30 days (Readmission):  yes   If yes, Readmission Assessment in CM Navigator will be completed. Advance Directives:      Code Status: Full Code   Patient's Primary Decision Maker is:      Primary Decision Maker: Elvis Aguilar  [phone]    Secondary Decision Maker: Layla Sarabia [facility] [phone]    Discharge Planning:    Patient lives with: (P) Spouse/Significant Other Type of Home: (P) House  Primary Care Giver: (P) Self  Patient Support Systems include: (P) Spouse/Significant Other   Current Financial resources: (P) Medicaid, Medicare  Current community resources:    Current services prior to admission: (P) None            Current DME:              Type of Home Care services:  (P) Skilled Therapy    ADLS  Prior functional level: (P) Independent in ADLs/IADLs  Current functional level: (P) Independent in ADLs/IADLs    PT AM-PAC:   /24  OT AM-PAC:   /24    Family can provide assistance at DC: Would you like Case Management to discuss the discharge plan with any other family members/significant others, and if so, who?     Plans to Return to Present Housing: (P) Yes  Other Identified Issues/Barriers

## 2023-12-06 NOTE — CARE COORDINATION
12/06/23 0917   Readmission Assessment   Number of Days since last admission? 8-30 days   Previous Disposition Home with Home Health   Who is being Interviewed Patient   What was the patient's/caregiver's perception as to why they think they needed to return back to the hospital? Other (Comment)  (\"I needed to have surgery on my toe\")   Did you visit your Primary Care Physician after you left the hospital, before you returned this time? No   Did you see a specialist, such as Cardiac, Pulmonary, Orthopedic Physician, etc. after you left the hospital? Yes   Who advised the patient to return to the hospital? Physician   Does the patient report anything that got in the way of taking their medications?  No     Bari Villa RN [facility]  Case Management

## 2023-12-06 NOTE — BRIEF OP NOTE
Brief Postoperative Note      Patient: Ermelinda Espinal  YOB: 1960  MRN: [id number]    Date of Procedure: 12/6/2023    Pre-Op Diagnosis Codes:     * Osteomyelitis of other site, unspecified type ([address] ) [M86.9]    Post-Op Diagnosis: Same       Procedure(s):  LEFT 2ND TOE AMPUTATION    Surgeon(s):  Cathy Lancaster DPM    Assistant:  Surgical Assistant: Ada Pickett    Anesthesia: Monitor Anesthesia Care    Estimated Blood Loss (mL): Minimal    Complications: None    Specimens:   ID Type Source Tests Collected by Time Destination   1 : left second toe bone(clean) Tissue Toe CULTURE, ANAEROBIC, CULTURE, TISSUE Cathy Lancaster DPM 12/6/2023 1646    2 : left second toe bone (dirty) Tissue Toe CULTURE, ANAEROBIC, CULTURE, TISSUE Cathy Lancaster DPM 12/6/2023 1648    A : left second toe amputation( dirty  margin) Tissue Toe SURGICAL PATHOLOGY Cathy Lancaster DPM 12/6/2023 1642    B : left second toe bone (clean margin) Tissue Toe SURGICAL PATHOLOGY Cathy Lancaster DPM 12/6/2023 1643        Implants:  * No implants in log *      Drains: * No LDAs found *    Findings: good perfusion, cellulitis had cleared,distal toe  bone infection,proximal clean, residual toe amputation      Electronically signed by Delvis Gunn DPM on 12/6/2023 at 4:53 PM

## 2023-12-06 NOTE — INTERVAL H&P NOTE
Update History & Physical    The patient's History and Physical of December 6, surgery was reviewed with the patient and I examined the patient. There was no change. The surgical site was confirmed by the patient and me. Plan: The risks, benefits, expected outcome, and alternative to the recommended procedure have been discussed with the patient. Patient understands and wants to proceed with the procedure.      Electronically signed by Dayanara Dejesus DPM on 12/6/2023 at 4:04 PM

## 2023-12-06 NOTE — PERIOP NOTE
TRANSFER - IN REPORT:    Verbal report received from KIP Todd on Venia Few  being received from Room 467 for ordered procedure      Report consisted of patient's Situation, Background, Assessment and   Recommendations(SBAR). Information from the following report(s) Nurse Handoff Report was reviewed with the receiving nurse. Opportunity for questions and clarification was provided. Assessment completed upon patient's arrival to unit and care assumed.

## 2023-12-07 LAB
ANION GAP SERPL CALC-SCNC: 7 MMOL/L (ref 3–18)
BASOPHILS # BLD: 0 K/UL (ref 0–0.1)
BASOPHILS NFR BLD: 1 % (ref 0–2)
BUN SERPL-MCNC: 13 MG/DL (ref 7–18)
BUN/CREAT SERPL: 14 (ref 12–20)
CALCIUM SERPL-MCNC: 8.6 MG/DL (ref 8.5–10.1)
CHLORIDE SERPL-SCNC: 110 MMOL/L (ref 100–111)
CO2 SERPL-SCNC: 22 MMOL/L (ref 21–32)
CREAT SERPL-MCNC: 0.95 MG/DL (ref 0.6–1.3)
DIFFERENTIAL METHOD BLD: ABNORMAL
EOSINOPHIL # BLD: 0.2 K/UL (ref 0–0.4)
EOSINOPHIL NFR BLD: 3 % (ref 0–5)
ERYTHROCYTE [DISTWIDTH] IN BLOOD BY AUTOMATED COUNT: 12.5 % (ref 11.6–14.5)
GLUCOSE SERPL-MCNC: 84 MG/DL (ref 74–99)
HCT VFR BLD AUTO: 35.3 % (ref 36–48)
HGB BLD-MCNC: 12 G/DL (ref 13–16)
IMM GRANULOCYTES # BLD AUTO: 0.1 K/UL (ref 0–0.04)
IMM GRANULOCYTES NFR BLD AUTO: 1 % (ref 0–0.5)
LYMPHOCYTES # BLD: 1.3 K/UL (ref 0.9–3.6)
LYMPHOCYTES NFR BLD: 15 % (ref 21–52)
MCH RBC QN AUTO: 29.7 PG (ref 24–34)
MCHC RBC AUTO-ENTMCNC: 34 G/DL (ref 31–37)
MCV RBC AUTO: 87.4 FL (ref 78–100)
MONOCYTES # BLD: 0.8 K/UL (ref 0.05–1.2)
MONOCYTES NFR BLD: 9 % (ref 3–10)
NEUTS SEG # BLD: 6.4 K/UL (ref 1.8–8)
NEUTS SEG NFR BLD: 73 % (ref 40–73)
NRBC # BLD: 0 K/UL (ref 0–0.01)
NRBC BLD-RTO: 0 PER 100 WBC
PLATELET # BLD AUTO: 262 K/UL (ref 135–420)
PMV BLD AUTO: 11.2 FL (ref 9.2–11.8)
POTASSIUM SERPL-SCNC: 3.7 MMOL/L (ref 3.5–5.5)
RBC # BLD AUTO: 4.04 M/UL (ref 4.35–5.65)
SODIUM SERPL-SCNC: 139 MMOL/L (ref 136–145)
WBC # BLD AUTO: 8.7 K/UL (ref 4.6–13.2)

## 2023-12-07 PROCEDURE — 2580000003 HC RX 258: Performed by: PODIATRIST

## 2023-12-07 PROCEDURE — 6360000002 HC RX W HCPCS: Performed by: INTERNAL MEDICINE

## 2023-12-07 PROCEDURE — 2580000003 HC RX 258: Performed by: INTERNAL MEDICINE

## 2023-12-07 PROCEDURE — 85025 COMPLETE CBC W/AUTO DIFF WBC: CPT

## 2023-12-07 PROCEDURE — 94761 N-INVAS EAR/PLS OXIMETRY MLT: CPT

## 2023-12-07 PROCEDURE — 6370000000 HC RX 637 (ALT 250 FOR IP): Performed by: STUDENT IN AN ORGANIZED HEALTH CARE EDUCATION/TRAINING PROGRAM

## 2023-12-07 PROCEDURE — 36415 COLL VENOUS BLD VENIPUNCTURE: CPT

## 2023-12-07 PROCEDURE — 1100000000 HC RM PRIVATE

## 2023-12-07 PROCEDURE — 80048 BASIC METABOLIC PNL TOTAL CA: CPT

## 2023-12-07 PROCEDURE — 6360000002 HC RX W HCPCS: Performed by: STUDENT IN AN ORGANIZED HEALTH CARE EDUCATION/TRAINING PROGRAM

## 2023-12-07 PROCEDURE — 99232 SBSQ HOSP IP/OBS MODERATE 35: CPT | Performed by: STUDENT IN AN ORGANIZED HEALTH CARE EDUCATION/TRAINING PROGRAM

## 2023-12-07 RX ORDER — OXYCODONE HYDROCHLORIDE 5 MG/1
10 TABLET ORAL EVERY 4 HOURS PRN
Status: DISCONTINUED | OUTPATIENT
Start: 2023-12-07 | End: 2023-12-08

## 2023-12-07 RX ADMIN — OXYCODONE HYDROCHLORIDE 10 MG: 5 TABLET ORAL at 21:43

## 2023-12-07 RX ADMIN — HYDROMORPHONE HYDROCHLORIDE 0.5 MG: 1 INJECTION, SOLUTION INTRAMUSCULAR; INTRAVENOUS; SUBCUTANEOUS at 03:15

## 2023-12-07 RX ADMIN — OXYCODONE HYDROCHLORIDE 5 MG: 5 TABLET ORAL at 04:37

## 2023-12-07 RX ADMIN — HYDROMORPHONE HYDROCHLORIDE 0.5 MG: 1 INJECTION, SOLUTION INTRAMUSCULAR; INTRAVENOUS; SUBCUTANEOUS at 02:08

## 2023-12-07 RX ADMIN — HYDROMORPHONE HYDROCHLORIDE 0.5 MG: 1 INJECTION, SOLUTION INTRAMUSCULAR; INTRAVENOUS; SUBCUTANEOUS at 06:43

## 2023-12-07 RX ADMIN — SODIUM CHLORIDE: 9 INJECTION, SOLUTION INTRAVENOUS at 17:46

## 2023-12-07 RX ADMIN — Medication 1 CAPSULE: at 07:39

## 2023-12-07 RX ADMIN — OXYCODONE HYDROCHLORIDE 5 MG: 5 TABLET ORAL at 07:39

## 2023-12-07 RX ADMIN — METRONIDAZOLE 500 MG: 500 INJECTION, SOLUTION INTRAVENOUS at 14:39

## 2023-12-07 RX ADMIN — CEFEPIME 2000 MG: 2 INJECTION, POWDER, FOR SOLUTION INTRAVENOUS at 00:27

## 2023-12-07 RX ADMIN — HYDROMORPHONE HYDROCHLORIDE 0.5 MG: 1 INJECTION, SOLUTION INTRAMUSCULAR; INTRAVENOUS; SUBCUTANEOUS at 11:49

## 2023-12-07 RX ADMIN — SODIUM CHLORIDE: 9 INJECTION, SOLUTION INTRAVENOUS at 07:35

## 2023-12-07 RX ADMIN — METRONIDAZOLE 500 MG: 500 INJECTION, SOLUTION INTRAVENOUS at 03:05

## 2023-12-07 RX ADMIN — ATORVASTATIN CALCIUM 20 MG: 20 TABLET, FILM COATED ORAL at 21:46

## 2023-12-07 RX ADMIN — OXYCODONE HYDROCHLORIDE 5 MG: 5 TABLET ORAL at 00:37

## 2023-12-07 RX ADMIN — AMLODIPINE BESYLATE 10 MG: 10 TABLET ORAL at 07:40

## 2023-12-07 RX ADMIN — CEFEPIME 2000 MG: 2 INJECTION, POWDER, FOR SOLUTION INTRAVENOUS at 07:36

## 2023-12-07 RX ADMIN — CEFEPIME 2000 MG: 2 INJECTION, POWDER, FOR SOLUTION INTRAVENOUS at 15:37

## 2023-12-07 RX ADMIN — SODIUM CHLORIDE: 9 INJECTION, SOLUTION INTRAVENOUS at 00:28

## 2023-12-07 RX ADMIN — HYDRALAZINE HYDROCHLORIDE 25 MG: 25 TABLET, FILM COATED ORAL at 21:45

## 2023-12-07 RX ADMIN — HYDROMORPHONE HYDROCHLORIDE 0.5 MG: 1 INJECTION, SOLUTION INTRAMUSCULAR; INTRAVENOUS; SUBCUTANEOUS at 15:29

## 2023-12-07 RX ADMIN — OXYCODONE HYDROCHLORIDE 10 MG: 5 TABLET ORAL at 17:45

## 2023-12-07 RX ADMIN — HYDRALAZINE HYDROCHLORIDE 25 MG: 25 TABLET, FILM COATED ORAL at 14:36

## 2023-12-07 ASSESSMENT — PAIN SCALES - GENERAL
PAINLEVEL_OUTOF10: 9
PAINLEVEL_OUTOF10: 8
PAINLEVEL_OUTOF10: 8
PAINLEVEL_OUTOF10: 7
PAINLEVEL_OUTOF10: 10
PAINLEVEL_OUTOF10: 4
PAINLEVEL_OUTOF10: 8
PAINLEVEL_OUTOF10: 8
PAINLEVEL_OUTOF10: 10
PAINLEVEL_OUTOF10: 7
PAINLEVEL_OUTOF10: 10
PAINLEVEL_OUTOF10: 9
PAINLEVEL_OUTOF10: 9
PAINLEVEL_OUTOF10: 4
PAINLEVEL_OUTOF10: 8

## 2023-12-07 ASSESSMENT — PAIN DESCRIPTION - FREQUENCY
FREQUENCY: CONTINUOUS

## 2023-12-07 ASSESSMENT — PAIN DESCRIPTION - ORIENTATION
ORIENTATION: LEFT

## 2023-12-07 ASSESSMENT — PAIN - FUNCTIONAL ASSESSMENT
PAIN_FUNCTIONAL_ASSESSMENT: ACTIVITIES ARE NOT PREVENTED
PAIN_FUNCTIONAL_ASSESSMENT: PREVENTS OR INTERFERES SOME ACTIVE ACTIVITIES AND ADLS

## 2023-12-07 ASSESSMENT — PAIN DESCRIPTION - LOCATION
LOCATION: TOE (COMMENT WHICH ONE)
LOCATION: FOOT;TOE (COMMENT WHICH ONE)
LOCATION: TOE (COMMENT WHICH ONE)
LOCATION: FOOT;TOE (COMMENT WHICH ONE)
LOCATION: TOE (COMMENT WHICH ONE)
LOCATION: TOE (COMMENT WHICH ONE)
LOCATION: FOOT
LOCATION: TOE (COMMENT WHICH ONE)
LOCATION: FOOT
LOCATION: TOE (COMMENT WHICH ONE)
LOCATION: TOE (COMMENT WHICH ONE)
LOCATION: FOOT
LOCATION: TOE (COMMENT WHICH ONE)

## 2023-12-07 ASSESSMENT — PAIN DESCRIPTION - ONSET
ONSET: ON-GOING

## 2023-12-07 ASSESSMENT — ENCOUNTER SYMPTOMS
NAUSEA: 0
SORE THROAT: 0
DIARRHEA: 0
CONSTIPATION: 0
HEMATOCHEZIA: 0
ABDOMINAL PAIN: 0
COLOR CHANGE: 0
PHOTOPHOBIA: 0
SHORTNESS OF BREATH: 0
EYE PAIN: 0
VOMITING: 0
WHEEZING: 0
COUGH: 0

## 2023-12-07 ASSESSMENT — PAIN DESCRIPTION - DESCRIPTORS
DESCRIPTORS: SPASM;SHARP;STABBING
DESCRIPTORS: ACHING
DESCRIPTORS: THROBBING;SHARP
DESCRIPTORS: BURNING;SPASM;SHARP
DESCRIPTORS: SHARP
DESCRIPTORS: ACHING

## 2023-12-07 ASSESSMENT — PAIN DESCRIPTION - PAIN TYPE
TYPE: CHRONIC PAIN

## 2023-12-07 ASSESSMENT — PAIN SCALES - WONG BAKER: WONGBAKER_NUMERICALRESPONSE: 0

## 2023-12-07 NOTE — SIGNIFICANT EVENT
RN notified of Positive blood culture result, Gram (+) rods in aerobic bottle. ID following, on IV antibiotics w/ Cefepime + Flagyl. No changes to antibiotic regimen. F/u ID reccs in AM.    Janis Wilkins MD  10:58 PM       Reports of worsening pain related to toe amputation w/ podiatry.     Regimen:  - Oxycodone 5mg Q4hr PRN  - Dilaudid 0.5mg IV Q4hr prn, x1 additional dose at 3AM  - Start End tidal CO2 monitoring     Janis Wilkins MD  3:04 AM

## 2023-12-07 NOTE — OP NOTE
[facility]  OPERATIVE REPORT    Name:  Janet Herman  MR#:   [id number]  :  1960  ACCOUNT #:  [de-identified]  DATE OF SERVICE:  2023    PREOPERATIVE DIAGNOSIS:  Osteomyelitis with bone infection and gangrene, left second toe stump. POSTOPERATIVE DIAGNOSIS:  Osteomyelitis with bone infection and gangrene, left second toe stump. PROCEDURE PERFORMED:  Amputation of left second toe with bone biopsy of second metatarsal for pathology and culture. SURGEON:  Leandra Tucker DPM.    ASSISTANT:  0.    ANESTHESIA:  MAC.    COMPLICATIONS:  0.    SPECIMENS REMOVED:  0.    IMPLANTS:  0.    ESTIMATED BLOOD LOSS:  0.    DESCRIPTION OF PROCEDURE:  On 2023, the patient was placed on the operating room table in the supine position. After adequate induction of MAC anesthesia, left lower extremity was prepped and draped in the usual sterile fashion. Attention was directed to the left second toe. Cellulitis had cleared since admission. The base of the toe was healthy. No cellulitis or swelling noted. Distal toe was gangrenous with bone exposure. Two semi-elliptical incisions were accomplished around the base of the toe and the toe was carefully disarticulated and sent for pathology and culture. Proximal bone biopsy was obtained from the metatarsal and sent for pathology and culture. Noticeable uric acid gouty deposits in the joint. Wound was thoroughly irrigated with antibiotic solution. There was good perfusion noted. Small blood vessels were bovied as necessary. It was reapproximated with Prolene suture and a soft compressive dressing. The patient tolerated the procedure and anesthesia well with vital signs stable throughout. The patient was transported to the recovery room in stable condition.       Leandra Tucker DPM      [id number]  D:  2023 17:09  T:  2023 11:04  JOB #:  [id number]  CC:  Leandra Tucker DPM

## 2023-12-08 LAB
ANION GAP SERPL CALC-SCNC: 7 MMOL/L (ref 3–18)
BACTERIA SPEC CULT: ABNORMAL
BASOPHILS # BLD: 0 K/UL (ref 0–0.1)
BASOPHILS NFR BLD: 0 % (ref 0–2)
BUN SERPL-MCNC: 13 MG/DL (ref 7–18)
BUN/CREAT SERPL: 14 (ref 12–20)
CALCIUM SERPL-MCNC: 8.7 MG/DL (ref 8.5–10.1)
CHLORIDE SERPL-SCNC: 106 MMOL/L (ref 100–111)
CO2 SERPL-SCNC: 22 MMOL/L (ref 21–32)
CREAT SERPL-MCNC: 0.92 MG/DL (ref 0.6–1.3)
DIFFERENTIAL METHOD BLD: ABNORMAL
EOSINOPHIL # BLD: 0.3 K/UL (ref 0–0.4)
EOSINOPHIL NFR BLD: 3 % (ref 0–5)
ERYTHROCYTE [DISTWIDTH] IN BLOOD BY AUTOMATED COUNT: 12.6 % (ref 11.6–14.5)
GLUCOSE SERPL-MCNC: 98 MG/DL (ref 74–99)
GRAM STN SPEC: ABNORMAL
GRAM STN SPEC: ABNORMAL
HCT VFR BLD AUTO: 36.1 % (ref 36–48)
HGB BLD-MCNC: 12 G/DL (ref 13–16)
IMM GRANULOCYTES # BLD AUTO: 0.1 K/UL (ref 0–0.04)
IMM GRANULOCYTES NFR BLD AUTO: 1 % (ref 0–0.5)
LYMPHOCYTES # BLD: 1 K/UL (ref 0.9–3.6)
LYMPHOCYTES NFR BLD: 10 % (ref 21–52)
MCH RBC QN AUTO: 29 PG (ref 24–34)
MCHC RBC AUTO-ENTMCNC: 33.2 G/DL (ref 31–37)
MCV RBC AUTO: 87.2 FL (ref 78–100)
MONOCYTES # BLD: 0.9 K/UL (ref 0.05–1.2)
MONOCYTES NFR BLD: 9 % (ref 3–10)
NEUTS SEG # BLD: 8 K/UL (ref 1.8–8)
NEUTS SEG NFR BLD: 77 % (ref 40–73)
NRBC # BLD: 0 K/UL (ref 0–0.01)
NRBC BLD-RTO: 0 PER 100 WBC
PLATELET # BLD AUTO: 284 K/UL (ref 135–420)
PMV BLD AUTO: 11.2 FL (ref 9.2–11.8)
POTASSIUM SERPL-SCNC: 3.7 MMOL/L (ref 3.5–5.5)
RBC # BLD AUTO: 4.14 M/UL (ref 4.35–5.65)
SERVICE CMNT-IMP: ABNORMAL
SODIUM SERPL-SCNC: 135 MMOL/L (ref 136–145)
WBC # BLD AUTO: 10.3 K/UL (ref 4.6–13.2)

## 2023-12-08 PROCEDURE — 6370000000 HC RX 637 (ALT 250 FOR IP): Performed by: INTERNAL MEDICINE

## 2023-12-08 PROCEDURE — 97162 PT EVAL MOD COMPLEX 30 MIN: CPT

## 2023-12-08 PROCEDURE — 85025 COMPLETE CBC W/AUTO DIFF WBC: CPT

## 2023-12-08 PROCEDURE — 97535 SELF CARE MNGMENT TRAINING: CPT

## 2023-12-08 PROCEDURE — 6370000000 HC RX 637 (ALT 250 FOR IP): Performed by: STUDENT IN AN ORGANIZED HEALTH CARE EDUCATION/TRAINING PROGRAM

## 2023-12-08 PROCEDURE — 97116 GAIT TRAINING THERAPY: CPT

## 2023-12-08 PROCEDURE — 80048 BASIC METABOLIC PNL TOTAL CA: CPT

## 2023-12-08 PROCEDURE — 36415 COLL VENOUS BLD VENIPUNCTURE: CPT

## 2023-12-08 PROCEDURE — 2580000003 HC RX 258: Performed by: INTERNAL MEDICINE

## 2023-12-08 PROCEDURE — 1100000000 HC RM PRIVATE

## 2023-12-08 PROCEDURE — 6360000002 HC RX W HCPCS: Performed by: STUDENT IN AN ORGANIZED HEALTH CARE EDUCATION/TRAINING PROGRAM

## 2023-12-08 PROCEDURE — 99232 SBSQ HOSP IP/OBS MODERATE 35: CPT | Performed by: STUDENT IN AN ORGANIZED HEALTH CARE EDUCATION/TRAINING PROGRAM

## 2023-12-08 PROCEDURE — 6360000002 HC RX W HCPCS: Performed by: INTERNAL MEDICINE

## 2023-12-08 PROCEDURE — 97166 OT EVAL MOD COMPLEX 45 MIN: CPT

## 2023-12-08 RX ORDER — TRAMADOL HYDROCHLORIDE 50 MG/1
50 TABLET ORAL EVERY 6 HOURS PRN
Status: DISCONTINUED | OUTPATIENT
Start: 2023-12-08 | End: 2023-12-10 | Stop reason: HOSPADM

## 2023-12-08 RX ORDER — TRAMADOL HYDROCHLORIDE 50 MG/1
100 TABLET ORAL EVERY 6 HOURS PRN
Status: DISCONTINUED | OUTPATIENT
Start: 2023-12-08 | End: 2023-12-10 | Stop reason: HOSPADM

## 2023-12-08 RX ORDER — IBUPROFEN 600 MG/1
600 TABLET ORAL EVERY 8 HOURS
Status: DISCONTINUED | OUTPATIENT
Start: 2023-12-08 | End: 2023-12-10 | Stop reason: HOSPADM

## 2023-12-08 RX ORDER — ACETAMINOPHEN 500 MG
1000 TABLET ORAL EVERY 8 HOURS SCHEDULED
Status: DISCONTINUED | OUTPATIENT
Start: 2023-12-08 | End: 2023-12-10 | Stop reason: HOSPADM

## 2023-12-08 RX ORDER — TRAMADOL HYDROCHLORIDE 50 MG/1
100 TABLET ORAL ONCE
Status: COMPLETED | OUTPATIENT
Start: 2023-12-08 | End: 2023-12-08

## 2023-12-08 RX ADMIN — HYDRALAZINE HYDROCHLORIDE 25 MG: 25 TABLET, FILM COATED ORAL at 05:14

## 2023-12-08 RX ADMIN — CEFEPIME 2000 MG: 2 INJECTION, POWDER, FOR SOLUTION INTRAVENOUS at 08:31

## 2023-12-08 RX ADMIN — TRAMADOL HYDROCHLORIDE 100 MG: 50 TABLET ORAL at 01:36

## 2023-12-08 RX ADMIN — ACETAMINOPHEN 1000 MG: 500 TABLET ORAL at 14:57

## 2023-12-08 RX ADMIN — AMLODIPINE BESYLATE 10 MG: 10 TABLET ORAL at 08:31

## 2023-12-08 RX ADMIN — CEFEPIME 2000 MG: 2 INJECTION, POWDER, FOR SOLUTION INTRAVENOUS at 00:30

## 2023-12-08 RX ADMIN — OXYCODONE HYDROCHLORIDE 10 MG: 5 TABLET ORAL at 10:41

## 2023-12-08 RX ADMIN — Medication 1 CAPSULE: at 08:31

## 2023-12-08 RX ADMIN — IBUPROFEN 600 MG: 600 TABLET, FILM COATED ORAL at 18:04

## 2023-12-08 RX ADMIN — TRAMADOL HYDROCHLORIDE 100 MG: 50 TABLET ORAL at 15:45

## 2023-12-08 RX ADMIN — ACETAMINOPHEN 1000 MG: 500 TABLET ORAL at 21:05

## 2023-12-08 RX ADMIN — CEFEPIME 2000 MG: 2 INJECTION, POWDER, FOR SOLUTION INTRAVENOUS at 23:45

## 2023-12-08 RX ADMIN — OXYCODONE HYDROCHLORIDE 10 MG: 5 TABLET ORAL at 05:12

## 2023-12-08 RX ADMIN — TRAMADOL HYDROCHLORIDE 50 MG: 50 TABLET ORAL at 23:44

## 2023-12-08 RX ADMIN — HYDROMORPHONE HYDROCHLORIDE 0.5 MG: 1 INJECTION, SOLUTION INTRAMUSCULAR; INTRAVENOUS; SUBCUTANEOUS at 08:33

## 2023-12-08 RX ADMIN — METRONIDAZOLE 500 MG: 500 INJECTION, SOLUTION INTRAVENOUS at 14:57

## 2023-12-08 RX ADMIN — CEFEPIME 2000 MG: 2 INJECTION, POWDER, FOR SOLUTION INTRAVENOUS at 16:02

## 2023-12-08 RX ADMIN — METRONIDAZOLE 500 MG: 500 INJECTION, SOLUTION INTRAVENOUS at 03:51

## 2023-12-08 RX ADMIN — HYDRALAZINE HYDROCHLORIDE 25 MG: 25 TABLET, FILM COATED ORAL at 13:56

## 2023-12-08 RX ADMIN — HYDRALAZINE HYDROCHLORIDE 25 MG: 25 TABLET, FILM COATED ORAL at 21:05

## 2023-12-08 RX ADMIN — HYDROMORPHONE HYDROCHLORIDE 0.5 MG: 1 INJECTION, SOLUTION INTRAMUSCULAR; INTRAVENOUS; SUBCUTANEOUS at 13:55

## 2023-12-08 ASSESSMENT — PAIN SCALES - GENERAL
PAINLEVEL_OUTOF10: 1
PAINLEVEL_OUTOF10: 0
PAINLEVEL_OUTOF10: 7
PAINLEVEL_OUTOF10: 2
PAINLEVEL_OUTOF10: 10
PAINLEVEL_OUTOF10: 5
PAINLEVEL_OUTOF10: 2
PAINLEVEL_OUTOF10: 5
PAINLEVEL_OUTOF10: 6
PAINLEVEL_OUTOF10: 7
PAINLEVEL_OUTOF10: 8
PAINLEVEL_OUTOF10: 2
PAINLEVEL_OUTOF10: 5
PAINLEVEL_OUTOF10: 2
PAINLEVEL_OUTOF10: 8
PAINLEVEL_OUTOF10: 6
PAINLEVEL_OUTOF10: 2

## 2023-12-08 ASSESSMENT — ENCOUNTER SYMPTOMS
SORE THROAT: 0
DIARRHEA: 0
SHORTNESS OF BREATH: 0
PHOTOPHOBIA: 0
COUGH: 0
NAUSEA: 0
HEMATOCHEZIA: 0
WHEEZING: 0
VOMITING: 0
CONSTIPATION: 0
COLOR CHANGE: 0
ABDOMINAL PAIN: 0
EYE PAIN: 0

## 2023-12-08 ASSESSMENT — PAIN DESCRIPTION - DESCRIPTORS
DESCRIPTORS: TINGLING;BURNING
DESCRIPTORS: SHARP;STABBING
DESCRIPTORS: ACHING
DESCRIPTORS: ACHING
DESCRIPTORS: SHARP;STABBING
DESCRIPTORS: THROBBING;BURNING
DESCRIPTORS: SORE
DESCRIPTORS: ACHING;BURNING;THROBBING

## 2023-12-08 ASSESSMENT — PAIN - FUNCTIONAL ASSESSMENT
PAIN_FUNCTIONAL_ASSESSMENT: PREVENTS OR INTERFERES WITH MANY ACTIVE NOT PASSIVE ACTIVITIES
PAIN_FUNCTIONAL_ASSESSMENT: PREVENTS OR INTERFERES SOME ACTIVE ACTIVITIES AND ADLS
PAIN_FUNCTIONAL_ASSESSMENT: PREVENTS OR INTERFERES SOME ACTIVE ACTIVITIES AND ADLS
PAIN_FUNCTIONAL_ASSESSMENT: ACTIVITIES ARE NOT PREVENTED
PAIN_FUNCTIONAL_ASSESSMENT: ACTIVITIES ARE NOT PREVENTED
PAIN_FUNCTIONAL_ASSESSMENT: PREVENTS OR INTERFERES SOME ACTIVE ACTIVITIES AND ADLS
PAIN_FUNCTIONAL_ASSESSMENT: PREVENTS OR INTERFERES SOME ACTIVE ACTIVITIES AND ADLS

## 2023-12-08 ASSESSMENT — PAIN DESCRIPTION - LOCATION
LOCATION: FOOT
LOCATION: TOE (COMMENT WHICH ONE)
LOCATION: FOOT
LOCATION: FOOT
LOCATION: TOE (COMMENT WHICH ONE)
LOCATION: FOOT
LOCATION: FOOT
LOCATION: TOE (COMMENT WHICH ONE)

## 2023-12-08 ASSESSMENT — PAIN DESCRIPTION - ORIENTATION
ORIENTATION: LEFT

## 2023-12-08 ASSESSMENT — PAIN DESCRIPTION - ONSET
ONSET: ON-GOING
ONSET: ON-GOING

## 2023-12-08 ASSESSMENT — PAIN DESCRIPTION - FREQUENCY
FREQUENCY: CONTINUOUS
FREQUENCY: CONTINUOUS

## 2023-12-08 ASSESSMENT — PAIN SCALES - WONG BAKER: WONGBAKER_NUMERICALRESPONSE: 0

## 2023-12-08 NOTE — CARE COORDINATION
Patient to discharge over the weekend likely tomorrow with [facility].  Wife to transport     IMM obtained at bedside     Patient discharging to a address with no stairs  [address] liaison aware of discharge

## 2023-12-09 LAB
BACTERIA SPEC CULT: ABNORMAL
BACTERIA SPEC CULT: NORMAL
BACTERIA SPEC CULT: NORMAL
GRAM STN SPEC: ABNORMAL
GRAM STN SPEC: NORMAL
GRAM STN SPEC: NORMAL
SERVICE CMNT-IMP: ABNORMAL
SERVICE CMNT-IMP: ABNORMAL
SERVICE CMNT-IMP: NORMAL

## 2023-12-09 PROCEDURE — 99232 SBSQ HOSP IP/OBS MODERATE 35: CPT | Performed by: INTERNAL MEDICINE

## 2023-12-09 PROCEDURE — 6370000000 HC RX 637 (ALT 250 FOR IP): Performed by: INTERNAL MEDICINE

## 2023-12-09 PROCEDURE — 1100000000 HC RM PRIVATE

## 2023-12-09 PROCEDURE — 2580000003 HC RX 258: Performed by: INTERNAL MEDICINE

## 2023-12-09 PROCEDURE — 6370000000 HC RX 637 (ALT 250 FOR IP): Performed by: STUDENT IN AN ORGANIZED HEALTH CARE EDUCATION/TRAINING PROGRAM

## 2023-12-09 PROCEDURE — 6360000002 HC RX W HCPCS: Performed by: INTERNAL MEDICINE

## 2023-12-09 RX ORDER — SENNA AND DOCUSATE SODIUM 50; 8.6 MG/1; MG/1
2 TABLET, FILM COATED ORAL DAILY PRN
Status: DISCONTINUED | OUTPATIENT
Start: 2023-12-09 | End: 2023-12-10 | Stop reason: HOSPADM

## 2023-12-09 RX ORDER — BISACODYL 5 MG/1
10 TABLET, DELAYED RELEASE ORAL ONCE
Status: COMPLETED | OUTPATIENT
Start: 2023-12-09 | End: 2023-12-09

## 2023-12-09 RX ADMIN — METRONIDAZOLE 500 MG: 500 INJECTION, SOLUTION INTRAVENOUS at 14:40

## 2023-12-09 RX ADMIN — AMLODIPINE BESYLATE 10 MG: 10 TABLET ORAL at 08:27

## 2023-12-09 RX ADMIN — IBUPROFEN 600 MG: 600 TABLET, FILM COATED ORAL at 18:38

## 2023-12-09 RX ADMIN — HYDRALAZINE HYDROCHLORIDE 25 MG: 25 TABLET, FILM COATED ORAL at 05:49

## 2023-12-09 RX ADMIN — ACETAMINOPHEN 1000 MG: 500 TABLET ORAL at 23:39

## 2023-12-09 RX ADMIN — Medication 1 CAPSULE: at 08:27

## 2023-12-09 RX ADMIN — CEFEPIME 2000 MG: 2 INJECTION, POWDER, FOR SOLUTION INTRAVENOUS at 23:40

## 2023-12-09 RX ADMIN — BISACODYL 10 MG: 5 TABLET, COATED ORAL at 13:12

## 2023-12-09 RX ADMIN — CEFEPIME 2000 MG: 2 INJECTION, POWDER, FOR SOLUTION INTRAVENOUS at 16:16

## 2023-12-09 RX ADMIN — ACETAMINOPHEN 1000 MG: 500 TABLET ORAL at 05:49

## 2023-12-09 RX ADMIN — HYDRALAZINE HYDROCHLORIDE 25 MG: 25 TABLET, FILM COATED ORAL at 13:12

## 2023-12-09 RX ADMIN — ACETAMINOPHEN 1000 MG: 500 TABLET ORAL at 14:40

## 2023-12-09 RX ADMIN — HYDRALAZINE HYDROCHLORIDE 25 MG: 25 TABLET, FILM COATED ORAL at 22:01

## 2023-12-09 RX ADMIN — IBUPROFEN 600 MG: 600 TABLET, FILM COATED ORAL at 11:31

## 2023-12-09 RX ADMIN — TRAMADOL HYDROCHLORIDE 100 MG: 50 TABLET ORAL at 08:27

## 2023-12-09 RX ADMIN — METRONIDAZOLE 500 MG: 500 INJECTION, SOLUTION INTRAVENOUS at 04:25

## 2023-12-09 RX ADMIN — CEFEPIME 2000 MG: 2 INJECTION, POWDER, FOR SOLUTION INTRAVENOUS at 08:26

## 2023-12-09 ASSESSMENT — PAIN DESCRIPTION - LOCATION
LOCATION: FOOT
LOCATION: FOOT

## 2023-12-09 ASSESSMENT — PAIN SCALES - GENERAL
PAINLEVEL_OUTOF10: 0
PAINLEVEL_OUTOF10: 6
PAINLEVEL_OUTOF10: 0
PAINLEVEL_OUTOF10: 3

## 2023-12-09 ASSESSMENT — PAIN - FUNCTIONAL ASSESSMENT: PAIN_FUNCTIONAL_ASSESSMENT: PREVENTS OR INTERFERES SOME ACTIVE ACTIVITIES AND ADLS

## 2023-12-09 ASSESSMENT — PAIN DESCRIPTION - DESCRIPTORS
DESCRIPTORS: THROBBING;SHARP
DESCRIPTORS: BURNING

## 2023-12-09 ASSESSMENT — PAIN DESCRIPTION - ORIENTATION
ORIENTATION: LEFT
ORIENTATION: LEFT

## 2023-12-10 ENCOUNTER — HOME HEALTH ADMISSION (OUTPATIENT)
Age: 63
End: 2023-12-10
Payer: MEDICARE

## 2023-12-10 VITALS
OXYGEN SATURATION: 97 % | HEART RATE: 89 BPM | SYSTOLIC BLOOD PRESSURE: 149 MMHG | WEIGHT: 207 LBS | RESPIRATION RATE: 20 BRPM | TEMPERATURE: 98.1 F | DIASTOLIC BLOOD PRESSURE: 82 MMHG | HEIGHT: 71 IN | BODY MASS INDEX: 28.98 KG/M2

## 2023-12-10 LAB
BACTERIA SPEC CULT: ABNORMAL
BACTERIA SPEC CULT: NORMAL
SERVICE CMNT-IMP: ABNORMAL
SERVICE CMNT-IMP: NORMAL

## 2023-12-10 PROCEDURE — 99239 HOSP IP/OBS DSCHRG MGMT >30: CPT | Performed by: INTERNAL MEDICINE

## 2023-12-10 PROCEDURE — 6370000000 HC RX 637 (ALT 250 FOR IP): Performed by: STUDENT IN AN ORGANIZED HEALTH CARE EDUCATION/TRAINING PROGRAM

## 2023-12-10 PROCEDURE — 2580000003 HC RX 258: Performed by: INTERNAL MEDICINE

## 2023-12-10 PROCEDURE — 6360000002 HC RX W HCPCS: Performed by: INTERNAL MEDICINE

## 2023-12-10 RX ORDER — HYDRALAZINE HYDROCHLORIDE 25 MG/1
25 TABLET, FILM COATED ORAL 3 TIMES DAILY
Qty: 90 TABLET | Refills: 0 | Status: SHIPPED | OUTPATIENT
Start: 2023-12-10

## 2023-12-10 RX ORDER — SENNA AND DOCUSATE SODIUM 50; 8.6 MG/1; MG/1
1 TABLET, FILM COATED ORAL DAILY PRN
Qty: 10 TABLET | Refills: 0 | Status: SHIPPED | OUTPATIENT
Start: 2023-12-10

## 2023-12-10 RX ORDER — AMOXICILLIN AND CLAVULANATE POTASSIUM 875; 125 MG/1; MG/1
1 TABLET, FILM COATED ORAL 2 TIMES DAILY
Qty: 20 TABLET | Refills: 0 | Status: SHIPPED | OUTPATIENT
Start: 2023-12-10 | End: 2023-12-20

## 2023-12-10 RX ORDER — ATORVASTATIN CALCIUM 20 MG/1
20 TABLET, FILM COATED ORAL DAILY
Qty: 30 TABLET | Refills: 0 | Status: SHIPPED | OUTPATIENT
Start: 2023-12-10

## 2023-12-10 RX ORDER — AMLODIPINE BESYLATE 10 MG/1
10 TABLET ORAL DAILY
Qty: 30 TABLET | Refills: 0 | Status: SHIPPED | OUTPATIENT
Start: 2023-12-10

## 2023-12-10 RX ORDER — LACTOBACILLUS RHAMNOSUS GG 10B CELL
1 CAPSULE ORAL
Qty: 14 CAPSULE | Refills: 0 | Status: SHIPPED | OUTPATIENT
Start: 2023-12-11

## 2023-12-10 RX ORDER — CIPROFLOXACIN 500 MG/1
500 TABLET, FILM COATED ORAL 2 TIMES DAILY
Qty: 20 TABLET | Refills: 0 | Status: SHIPPED | OUTPATIENT
Start: 2023-12-10 | End: 2023-12-20

## 2023-12-10 RX ADMIN — IBUPROFEN 600 MG: 600 TABLET, FILM COATED ORAL at 03:09

## 2023-12-10 RX ADMIN — IBUPROFEN 600 MG: 600 TABLET, FILM COATED ORAL at 10:37

## 2023-12-10 RX ADMIN — METRONIDAZOLE 500 MG: 500 INJECTION, SOLUTION INTRAVENOUS at 03:10

## 2023-12-10 RX ADMIN — ACETAMINOPHEN 1000 MG: 500 TABLET ORAL at 06:13

## 2023-12-10 RX ADMIN — HYDRALAZINE HYDROCHLORIDE 25 MG: 25 TABLET, FILM COATED ORAL at 06:13

## 2023-12-10 RX ADMIN — AMLODIPINE BESYLATE 10 MG: 10 TABLET ORAL at 10:39

## 2023-12-10 RX ADMIN — CEFEPIME 2000 MG: 2 INJECTION, POWDER, FOR SOLUTION INTRAVENOUS at 10:40

## 2023-12-10 RX ADMIN — Medication 1 CAPSULE: at 10:49

## 2023-12-10 ASSESSMENT — PAIN DESCRIPTION - DESCRIPTORS
DESCRIPTORS: BURNING
DESCRIPTORS: ACHING

## 2023-12-10 ASSESSMENT — PAIN DESCRIPTION - LOCATION
LOCATION: FOOT
LOCATION: FOOT;ANKLE
LOCATION: FOOT

## 2023-12-10 ASSESSMENT — PAIN SCALES - GENERAL
PAINLEVEL_OUTOF10: 0
PAINLEVEL_OUTOF10: 3
PAINLEVEL_OUTOF10: 3
PAINLEVEL_OUTOF10: 2
PAINLEVEL_OUTOF10: 0
PAINLEVEL_OUTOF10: 0

## 2023-12-10 ASSESSMENT — PAIN SCALES - WONG BAKER
WONGBAKER_NUMERICALRESPONSE: 0
WONGBAKER_NUMERICALRESPONSE: 0

## 2023-12-10 NOTE — CARE COORDINATION
Discharge order noted for today. Orders received. Home health already arranged with [facility]. No other needs identified at this time. Case management remains available as needed. Patient's wife to transport pt home at time of discharge.     [facility] RN CORWINES  Case Management

## 2023-12-10 NOTE — PLAN OF CARE
Analgesia assessment with each VS and PRN   Problem: Pain  Goal: Verbalizes/displays adequate comfort level or baseline comfort level  Outcome: Not Progressing
Problem: Pain  Goal: Verbalizes/displays adequate comfort level or baseline comfort level  12/10/2023 0303 by Sterling Leos RN  Outcome: Progressing  12/10/2023 0303 by Sterling Leos RN  Outcome: Progressing     Problem: ABCDS Injury Assessment  Goal: Absence of physical injury  12/10/2023 0303 by Sterling Leos RN  Outcome: Progressing  12/10/2023 0303 by Sterling Leos, RN  Outcome: Progressing     Problem: Safety - Adult  Goal: Free from fall injury  12/10/2023 0303 by Sterling Leos RN  Outcome: Progressing  12/10/2023 0303 by Sterling Leos RN  Outcome: Progressing
Problem: Pain  Goal: Verbalizes/displays adequate comfort level or baseline comfort level  12/10/2023 1442 by Kaycee Almazan RN  Outcome: Adequate for Discharge  12/10/2023 0303 by Sandrine Fernández RN  Outcome: Progressing  12/10/2023 0303 by Sandrine Fernández RN  Outcome: Progressing     Problem: ABCDS Injury Assessment  Goal: Absence of physical injury  12/10/2023 1442 by Kaycee Almazan RN  Outcome: Adequate for Discharge  12/10/2023 0303 by Sandrine Fernández RN  Outcome: Progressing  12/10/2023 0303 by Sandrine Fernández RN  Outcome: Progressing     Problem: Safety - Adult  Goal: Free from fall injury  12/10/2023 1442 by Kaycee Almazan RN  Outcome: Adequate for Discharge  12/10/2023 0303 by Sandrine Fernández RN  Outcome: Progressing  12/10/2023 0303 by Sandrine Fernández RN  Outcome: Progressing   Discharge instructions given to patient including printed prescription scripts, medication education and follow appointment. Pt verbalized understanding. PI removed, catheter intact and no s/s of infections noted. Pt was stable at the time of discharge. Transporter wheeled pt off unit to meet wife at the lobby.
Problem: Pain  Goal: Verbalizes/displays adequate comfort level or baseline comfort level  12/8/2023 1839 by Flor Contreras RN  Outcome: Progressing  12/8/2023 1837 by Flor Contreras RN  Outcome: Progressing     Problem: ABCDS Injury Assessment  Goal: Absence of physical injury  12/8/2023 1839 by Flor Contreras RN  Outcome: Progressing  12/8/2023 1837 by Flor Contreras RN  Outcome: Progressing     Problem: Safety - Adult  Goal: Free from fall injury  12/8/2023 1839 by Flor Contreras RN  Outcome: Progressing  12/8/2023 1837 by Flor Contreras RN  Outcome: Progressing     Problem: Physical Therapy - Adult  Goal: By Discharge: Performs mobility at highest level of function for planned discharge setting. See evaluation for individualized goals. Description: Physical Therapy Goals  Initiated 12/8/2023 and to be accomplished within 7 day(s)  1. Patient will move from supine to sit and sit to supine  in bed with modified independence. 2.  Patient will transfer from bed to chair and chair to bed with modified independence using the least restrictive device. 3.  Patient will perform sit to stand with modified independence. 4.  Patient will ambulate with modified independence for 100 feet with the least restrictive device. 5.  Patient will ascend/descend 3 stairs with handrail(s) with supervision/set-up. PLOF: Lives with wife. One story home with 3 steps with handrails to enter. Plans to discharge to mother's home with ramp entry.    12/8/2023 1020 by Safia Segovia PT  Outcome: Progressing
Problem: Pain  Goal: Verbalizes/displays adequate comfort level or baseline comfort level  Outcome: Progressing
Problem: Pain  Goal: Verbalizes/displays adequate comfort level or baseline comfort level  Outcome: Progressing     Problem: ABCDS Injury Assessment  Goal: Absence of physical injury  Outcome: Progressing     Problem: Safety - Adult  Goal: Free from fall injury  Outcome: Progressing     Problem: Physical Therapy - Adult  Goal: By Discharge: Performs mobility at highest level of function for planned discharge setting. See evaluation for individualized goals. Description: Physical Therapy Goals  Initiated 12/8/2023 and to be accomplished within 7 day(s)  1. Patient will move from supine to sit and sit to supine  in bed with modified independence. 2.  Patient will transfer from bed to chair and chair to bed with modified independence using the least restrictive device. 3.  Patient will perform sit to stand with modified independence. 4.  Patient will ambulate with modified independence for 100 feet with the least restrictive device. 5.  Patient will ascend/descend 3 stairs with handrail(s) with supervision/set-up. PLOF: Lives with wife. One story home with 3 steps with handrails to enter. Plans to discharge to mother's home with ramp entry.    12/8/2023 1020 by Venkata Denise PT  Outcome: Progressing
Problem: Pain  Goal: Verbalizes/displays adequate comfort level or baseline comfort level  Outcome: Progressing  Flowsheets (Taken 12/9/2023 0805)  Verbalizes/displays adequate comfort level or baseline comfort level:   Encourage patient to monitor pain and request assistance   Assess pain using appropriate pain scale   Administer analgesics based on type and severity of pain and evaluate response   Implement non-pharmacological measures as appropriate and evaluate response   Consider cultural and social influences on pain and pain management   Notify Licensed Independent Practitioner if interventions unsuccessful or patient reports new pain     Problem: ABCDS Injury Assessment  Goal: Absence of physical injury  Outcome: Progressing     Problem: Safety - Adult  Goal: Free from fall injury  Outcome: Progressing
mobility ; Decreased balance;Decreased endurance;Decreased safe awareness    Patient will benefit from skilled intervention to address the above impairments. Patient's rehabilitation potential: Therapy Prognosis: Good. Factors which may influence rehabilitation potential include:  []         None noted  []         Mental ability/status  [x]         Medical condition  []         Home/family situation and support systems  []         Safety awareness  []         Pain tolerance/management  []         Other:      PLAN :  Recommendations and Planned Interventions:   [x]           Bed Mobility Training             [x]    Neuromuscular Re-Education  [x]           Transfer Training                   []    Orthotic/Prosthetic Training  [x]           Gait Training                          []    Modalities  [x]           Therapeutic Exercises           []    Edema Management/Control  [x]           Therapeutic Activities            [x]    Family Training/Education  [x]           Patient Education  []           Other (comment):    Frequency/Duration: Patient will be followed by physical therapy 1-2 times per day/3-5 days per week to address goals. Further Equipment Recommendations for Discharge: rolling walker    AM-PAC Inpatient Mobility Raw Score : 20     This AMPA score should be considered in conjunction with interdisciplinary team recommendations to determine the most appropriate discharge setting. Patient's social support, diagnosis, medical stability, and prior level of function should also be taken into consideration. Current research shows that an AM-PAC score of 18 (14 without stairs) or greater is associated with a discharge to the patient's home setting. Based on above AM-PAC score and current functional mobility deficits, it is recommended that the patient have 2-3 sessions per week of Physical Therapy at d/c to increase the patient's independence. Plans to discharge to mother's home with ramp entry.

## 2023-12-10 NOTE — DISCHARGE SUMMARY
[facility]    Patient: Kim Clay               Sex: male          DOA: 12/5/2023         YOB: 1960      Age:  59 y.o.        LOS:  LOS: 5 days                Admit Date: 12/5/2023    Discharge Date: 12/10/2023    Admission Diagnoses: Foot infection [L08.9]  Toe infection [L08.9]    Discharge Diagnoses:      Patient Active Problem List   Diagnosis    Sepsis ([address])    Gangrene of left foot ([address])    Borderline diabetes mellitus    Peripheral arterial disease ([address])    Foot infection    Primary hypertension     Left second toe amputation stump with gangrene  Peripheral arterial disease  Hypertension  Constipation, resolved    Discharge Condition:  Improved    Hospital Course: Patient initially presented to hospital on December 5, 2023 with complaint of left foot infection causing pain and discomfort. Please refer to hospital admission H&P for further detail. His clinical presentation was consistent with left second toe amputation site infection as patient had foul-smelling drainage from that area. Patient was started on IV antibiotics. Patient's wound culture was negative for MRSA. Patient underwent left second toe amputation by podiatrist.  Intraoperative culture was negative for MRSA other than coag negative staph. Patient was seen by podiatrist on the day of discharge and cleared for discharge. Patient was continued on atorvastatin, amlodipine and hydralazine for his chronic comorbidities management. Patient was seen by ID who recommended Cipro and Augmentin until December 20, 2023. Patient will be discharged home on the following medications today.     Physical examination:    Please refer to my progress note from 12/10/23 for further detail    Consults:    Treatment Team: Attending Provider: Khadra Vásquez MD; Physician: Lauren Sneior MD; Physician: Seth Giles DPM; Surgeon: Seth Giles DPM; Registered Nurse: Elgin Govea

## 2023-12-11 LAB
BACTERIA SPEC CULT: NORMAL
SERVICE CMNT-IMP: NORMAL

## 2023-12-11 NOTE — HOME CARE
Discharge noted over the weekend; [facility] will follow patient for SN,wound care ,PT,OT; [facility] referral updated and processed over the weekend by [facility] central Intake. KRZYSZTOF ELIAS.

## 2023-12-12 ENCOUNTER — HOME CARE VISIT (OUTPATIENT)
Age: 63
End: 2023-12-12

## 2023-12-13 ENCOUNTER — HOME CARE VISIT (OUTPATIENT)
Age: 63
End: 2023-12-13

## 2023-12-13 VITALS
HEART RATE: 90 BPM | OXYGEN SATURATION: 99 % | DIASTOLIC BLOOD PRESSURE: 80 MMHG | TEMPERATURE: 98.2 F | SYSTOLIC BLOOD PRESSURE: 160 MMHG | RESPIRATION RATE: 18 BRPM

## 2023-12-13 PROCEDURE — 0221000100 HH NO PAY CLAIM PROCEDURE

## 2023-12-13 PROCEDURE — G0299 HHS/HOSPICE OF RN EA 15 MIN: HCPCS

## 2023-12-15 ENCOUNTER — HOME CARE VISIT (OUTPATIENT)
Age: 63
End: 2023-12-15

## 2023-12-15 VITALS
RESPIRATION RATE: 16 BRPM | TEMPERATURE: 97 F | OXYGEN SATURATION: 98 % | HEART RATE: 98 BPM | DIASTOLIC BLOOD PRESSURE: 72 MMHG | SYSTOLIC BLOOD PRESSURE: 104 MMHG

## 2023-12-15 PROCEDURE — G0300 HHS/HOSPICE OF LPN EA 15 MIN: HCPCS

## 2023-12-18 ENCOUNTER — HOME CARE VISIT (OUTPATIENT)
Age: 63
End: 2023-12-18
Payer: MEDICARE

## 2023-12-18 VITALS
DIASTOLIC BLOOD PRESSURE: 82 MMHG | RESPIRATION RATE: 16 BRPM | HEART RATE: 90 BPM | OXYGEN SATURATION: 98 % | TEMPERATURE: 98 F | SYSTOLIC BLOOD PRESSURE: 120 MMHG

## 2023-12-18 PROCEDURE — G0300 HHS/HOSPICE OF LPN EA 15 MIN: HCPCS

## 2023-12-29 NOTE — HOME HEALTH
Skilled reason for visit: Patient is a 59year old male living in a single family home with Mother, [address] in for wound care to left second toe removal.          Caregiver involvement: CG assists with adls and adl's, MD appointments. Medications reviewed and all medications are available in the home this visit. The following education was provided regarding medications: Instructed patient/caregiver to take exact amount of narcotics prescribed, signs and symptoms of oversedation, notify SN/PT if oversedated. May cause constipation, notify SN/PT if no BM x 3 days. .      MD notified of any discrepancies/look a-like medications/medication interactions: n/a    Medications are somewhat effective at this time. Home health supplies by type and quantity ordered/delivered this visit include: n/a         Patient education provided this visit: INSTRUCTED PATIENT AND CG THAT SHOULD ANY NEEDS OR CONCERNS ARISE TO FIRST CALL OUR OFFICE, OR THE DR'S OFFICE  OR GO TO AN URGENT CARE CENTER AND NOT TO THE ED FOR NON-LIFE THREATENING EVENTS. IF IT IS LIFE THREATENING THEN CALL 911 OR GO TO THE CLOSEST ER. Patient encouraged to apply ice packs to foot and elevated to assist with discomfort relief. Sharps education provided: n/a         Patient level of understanding of education provided: patient is able to verbalize 100% of education. Patient response to procedure performed:  tolerated well, no complaint of pain or discomfort. Agency Progress toward goals: see interventions         Patient's Progress towards personal goals: see interverntions         Home exercise program:  Take medications as prescribed, monitor for signs of infection, monitor tolerance of feedings and advance rate per directions, fall precautions, keep all MD appointments. Continued need for the following skills: Nursing and Physical Therapy.          Plan for next visit: continue with wound care

## 2024-03-25 ENCOUNTER — HOSPITAL ENCOUNTER (OUTPATIENT)
Facility: HOSPITAL | Age: 64
Discharge: HOME | End: 2024-03-28
Payer: MEDICARE

## 2024-03-25 ENCOUNTER — TRANSCRIBE ORDERS (OUTPATIENT)
Facility: HOSPITAL | Age: 64
End: 2024-03-25

## 2024-03-25 DIAGNOSIS — I49.8 NODAL RHYTHM DISORDER: Primary | ICD-10-CM

## 2024-03-25 DIAGNOSIS — I49.8 NODAL RHYTHM DISORDER: ICD-10-CM

## 2024-03-25 PROCEDURE — 93005 ELECTROCARDIOGRAM TRACING: CPT

## 2024-03-26 LAB
EKG ATRIAL RATE: 85 BPM
EKG DIAGNOSIS: NORMAL
EKG P AXIS: 74 DEGREES
EKG P-R INTERVAL: 178 MS
EKG Q-T INTERVAL: 344 MS
EKG QRS DURATION: 84 MS
EKG QTC CALCULATION (BAZETT): 409 MS
EKG R AXIS: 69 DEGREES
EKG T AXIS: 77 DEGREES
EKG VENTRICULAR RATE: 85 BPM

## 2024-04-02 ENCOUNTER — HOSPITAL ENCOUNTER (OUTPATIENT)
Facility: HOSPITAL | Age: 64
Discharge: HOME | End: 2024-04-05
Payer: MEDICARE

## 2024-04-02 DIAGNOSIS — I70.293 OTH ATHSCL NATIVE ARTERIES OF EXTREMITIES, BILATERAL LEGS (HCC): ICD-10-CM

## 2024-04-02 LAB — CREAT UR-MCNC: 1.2 MG/DL (ref 0.6–1.3)

## 2024-04-02 PROCEDURE — 82565 ASSAY OF CREATININE: CPT

## 2024-04-02 PROCEDURE — 74174 CTA ABD&PLVS W/CONTRAST: CPT

## 2024-04-02 PROCEDURE — 6360000004 HC RX CONTRAST MEDICATION

## 2024-04-02 RX ADMIN — IOPAMIDOL 100 ML: 612 INJECTION, SOLUTION INTRAVENOUS at 09:40

## 2024-05-15 ENCOUNTER — APPOINTMENT (OUTPATIENT)
Facility: HOSPITAL | Age: 64
DRG: 504 | End: 2024-05-15
Payer: MEDICARE

## 2024-05-15 ENCOUNTER — HOSPITAL ENCOUNTER (INPATIENT)
Facility: HOSPITAL | Age: 64
LOS: 6 days | Discharge: HOME | DRG: 504 | End: 2024-05-21
Attending: STUDENT IN AN ORGANIZED HEALTH CARE EDUCATION/TRAINING PROGRAM | Admitting: STUDENT IN AN ORGANIZED HEALTH CARE EDUCATION/TRAINING PROGRAM
Payer: MEDICARE

## 2024-05-15 DIAGNOSIS — I96 GANGRENE (HCC): Primary | ICD-10-CM

## 2024-05-15 DIAGNOSIS — A41.9 SEPTICEMIA (HCC): ICD-10-CM

## 2024-05-15 DIAGNOSIS — I96 GANGRENE OF RIGHT FOOT (HCC): ICD-10-CM

## 2024-05-15 DIAGNOSIS — M86.9 TOE OSTEOMYELITIS, RIGHT (HCC): ICD-10-CM

## 2024-05-15 DIAGNOSIS — I73.9 PERIPHERAL ARTERIAL DISEASE (HCC): ICD-10-CM

## 2024-05-15 LAB
ABO + RH BLD: NORMAL
ANION GAP SERPL CALC-SCNC: 4 MMOL/L (ref 3–18)
APPEARANCE UR: CLEAR
BACTERIA URNS QL MICRO: ABNORMAL /HPF
BASOPHILS # BLD: 0.1 K/UL (ref 0–0.1)
BASOPHILS NFR BLD: 1 % (ref 0–2)
BILIRUB UR QL: NEGATIVE
BLOOD GROUP ANTIBODIES SERPL: NORMAL
BUN SERPL-MCNC: 26 MG/DL (ref 7–18)
BUN/CREAT SERPL: 17 (ref 12–20)
CALCIUM SERPL-MCNC: 9.9 MG/DL (ref 8.5–10.1)
CHLORIDE SERPL-SCNC: 104 MMOL/L (ref 100–111)
CO2 SERPL-SCNC: 29 MMOL/L (ref 21–32)
COLOR UR: YELLOW
CREAT SERPL-MCNC: 1.5 MG/DL (ref 0.6–1.3)
DIFFERENTIAL METHOD BLD: ABNORMAL
EOSINOPHIL # BLD: 0.3 K/UL (ref 0–0.4)
EOSINOPHIL NFR BLD: 2 % (ref 0–5)
EPITH CASTS URNS QL MICRO: ABNORMAL /LPF (ref 0–5)
ERYTHROCYTE [DISTWIDTH] IN BLOOD BY AUTOMATED COUNT: 14.3 % (ref 11.6–14.5)
GLUCOSE SERPL-MCNC: 105 MG/DL (ref 74–99)
GLUCOSE UR STRIP.AUTO-MCNC: NEGATIVE MG/DL
HCT VFR BLD AUTO: 34.8 % (ref 36–48)
HGB BLD-MCNC: 11.4 G/DL (ref 13–16)
HGB UR QL STRIP: NEGATIVE
IMM GRANULOCYTES # BLD AUTO: 0.1 K/UL (ref 0–0.04)
IMM GRANULOCYTES NFR BLD AUTO: 1 % (ref 0–0.5)
KETONES UR QL STRIP.AUTO: NEGATIVE MG/DL
LACTATE SERPL-SCNC: 0.8 MMOL/L (ref 0.4–2)
LEUKOCYTE ESTERASE UR QL STRIP.AUTO: NEGATIVE
LYMPHOCYTES # BLD: 1.5 K/UL (ref 0.9–3.6)
LYMPHOCYTES NFR BLD: 12 % (ref 21–52)
MCH RBC QN AUTO: 29.6 PG (ref 24–34)
MCHC RBC AUTO-ENTMCNC: 32.8 G/DL (ref 31–37)
MCV RBC AUTO: 90.4 FL (ref 78–100)
MONOCYTES # BLD: 0.6 K/UL (ref 0.05–1.2)
MONOCYTES NFR BLD: 5 % (ref 3–10)
MUCOUS THREADS URNS QL MICRO: ABNORMAL /LPF
NEUTS SEG # BLD: 10.3 K/UL (ref 1.8–8)
NEUTS SEG NFR BLD: 80 % (ref 40–73)
NITRITE UR QL STRIP.AUTO: NEGATIVE
NRBC # BLD: 0 K/UL (ref 0–0.01)
NRBC BLD-RTO: 0 PER 100 WBC
PH UR STRIP: 5 (ref 5–8)
PLATELET # BLD AUTO: 563 K/UL (ref 135–420)
PMV BLD AUTO: 9.5 FL (ref 9.2–11.8)
POTASSIUM SERPL-SCNC: 4.1 MMOL/L (ref 3.5–5.5)
PROCALCITONIN SERPL-MCNC: <0.05 NG/ML
PROT UR STRIP-MCNC: ABNORMAL MG/DL
RBC # BLD AUTO: 3.85 M/UL (ref 4.35–5.65)
RBC #/AREA URNS HPF: ABNORMAL /HPF (ref 0–5)
SODIUM SERPL-SCNC: 137 MMOL/L (ref 136–145)
SP GR UR REFRACTOMETRY: >1.03 (ref 1–1.03)
SPECIMEN EXP DATE BLD: NORMAL
UROBILINOGEN UR QL STRIP.AUTO: 0.2 EU/DL (ref 0.2–1)
WBC # BLD AUTO: 12.9 K/UL (ref 4.6–13.2)
WBC URNS QL MICRO: ABNORMAL /HPF (ref 0–4)

## 2024-05-15 PROCEDURE — 2580000003 HC RX 258: Performed by: PHYSICIAN ASSISTANT

## 2024-05-15 PROCEDURE — 86900 BLOOD TYPING SEROLOGIC ABO: CPT

## 2024-05-15 PROCEDURE — 87077 CULTURE AEROBIC IDENTIFY: CPT

## 2024-05-15 PROCEDURE — 85025 COMPLETE CBC W/AUTO DIFF WBC: CPT

## 2024-05-15 PROCEDURE — 96365 THER/PROPH/DIAG IV INF INIT: CPT

## 2024-05-15 PROCEDURE — 96374 THER/PROPH/DIAG INJ IV PUSH: CPT

## 2024-05-15 PROCEDURE — 84145 PROCALCITONIN (PCT): CPT

## 2024-05-15 PROCEDURE — 99285 EMERGENCY DEPT VISIT HI MDM: CPT

## 2024-05-15 PROCEDURE — 86850 RBC ANTIBODY SCREEN: CPT

## 2024-05-15 PROCEDURE — 87185 SC STD ENZYME DETCJ PER NZM: CPT

## 2024-05-15 PROCEDURE — 87154 CUL TYP ID BLD PTHGN 6+ TRGT: CPT

## 2024-05-15 PROCEDURE — 83605 ASSAY OF LACTIC ACID: CPT

## 2024-05-15 PROCEDURE — 87040 BLOOD CULTURE FOR BACTERIA: CPT

## 2024-05-15 PROCEDURE — 86901 BLOOD TYPING SEROLOGIC RH(D): CPT

## 2024-05-15 PROCEDURE — 73620 X-RAY EXAM OF FOOT: CPT

## 2024-05-15 PROCEDURE — 93005 ELECTROCARDIOGRAM TRACING: CPT | Performed by: PHYSICIAN ASSISTANT

## 2024-05-15 PROCEDURE — 96375 TX/PRO/DX INJ NEW DRUG ADDON: CPT

## 2024-05-15 PROCEDURE — 6370000000 HC RX 637 (ALT 250 FOR IP): Performed by: PHYSICIAN ASSISTANT

## 2024-05-15 PROCEDURE — 71045 X-RAY EXAM CHEST 1 VIEW: CPT

## 2024-05-15 PROCEDURE — 87070 CULTURE OTHR SPECIMN AEROBIC: CPT

## 2024-05-15 PROCEDURE — 6360000002 HC RX W HCPCS: Performed by: INTERNAL MEDICINE

## 2024-05-15 PROCEDURE — 81001 URINALYSIS AUTO W/SCOPE: CPT

## 2024-05-15 PROCEDURE — 87205 SMEAR GRAM STAIN: CPT

## 2024-05-15 PROCEDURE — 2580000003 HC RX 258: Performed by: INTERNAL MEDICINE

## 2024-05-15 PROCEDURE — 1100000000 HC RM PRIVATE

## 2024-05-15 PROCEDURE — 6360000002 HC RX W HCPCS: Performed by: PHYSICIAN ASSISTANT

## 2024-05-15 PROCEDURE — 80048 BASIC METABOLIC PNL TOTAL CA: CPT

## 2024-05-15 PROCEDURE — 99223 1ST HOSP IP/OBS HIGH 75: CPT | Performed by: PHYSICIAN ASSISTANT

## 2024-05-15 RX ORDER — POTASSIUM CHLORIDE 7.45 MG/ML
10 INJECTION INTRAVENOUS PRN
Status: DISCONTINUED | OUTPATIENT
Start: 2024-05-15 | End: 2024-05-21 | Stop reason: HOSPADM

## 2024-05-15 RX ORDER — POTASSIUM CHLORIDE 20 MEQ/1
40 TABLET, EXTENDED RELEASE ORAL PRN
Status: DISCONTINUED | OUTPATIENT
Start: 2024-05-15 | End: 2024-05-21 | Stop reason: HOSPADM

## 2024-05-15 RX ORDER — RIVAROXABAN 2.5 MG/1
2.5 TABLET, FILM COATED ORAL 2 TIMES DAILY
COMMUNITY
Start: 2024-03-29

## 2024-05-15 RX ORDER — SODIUM CHLORIDE 0.9 % (FLUSH) 0.9 %
5-40 SYRINGE (ML) INJECTION EVERY 12 HOURS SCHEDULED
Status: DISCONTINUED | OUTPATIENT
Start: 2024-05-15 | End: 2024-05-21 | Stop reason: HOSPADM

## 2024-05-15 RX ORDER — TRAMADOL HYDROCHLORIDE 50 MG/1
TABLET ORAL
Status: ON HOLD | COMMUNITY
Start: 2024-04-10 | End: 2024-05-21

## 2024-05-15 RX ORDER — 0.9 % SODIUM CHLORIDE 0.9 %
30 INTRAVENOUS SOLUTION INTRAVENOUS ONCE
Status: DISCONTINUED | OUTPATIENT
Start: 2024-05-15 | End: 2024-05-15

## 2024-05-15 RX ORDER — ONDANSETRON 2 MG/ML
4 INJECTION INTRAMUSCULAR; INTRAVENOUS EVERY 6 HOURS PRN
Status: DISCONTINUED | OUTPATIENT
Start: 2024-05-15 | End: 2024-05-21 | Stop reason: HOSPADM

## 2024-05-15 RX ORDER — MAGNESIUM SULFATE IN WATER 40 MG/ML
2000 INJECTION, SOLUTION INTRAVENOUS PRN
Status: DISCONTINUED | OUTPATIENT
Start: 2024-05-15 | End: 2024-05-21 | Stop reason: HOSPADM

## 2024-05-15 RX ORDER — SODIUM CHLORIDE 9 MG/ML
INJECTION, SOLUTION INTRAVENOUS PRN
Status: DISCONTINUED | OUTPATIENT
Start: 2024-05-15 | End: 2024-05-21 | Stop reason: HOSPADM

## 2024-05-15 RX ORDER — POLYETHYLENE GLYCOL 3350 17 G/17G
17 POWDER, FOR SOLUTION ORAL DAILY PRN
Status: DISCONTINUED | OUTPATIENT
Start: 2024-05-15 | End: 2024-05-21 | Stop reason: HOSPADM

## 2024-05-15 RX ORDER — MORPHINE SULFATE 4 MG/ML
4 INJECTION, SOLUTION INTRAMUSCULAR; INTRAVENOUS
Status: COMPLETED | OUTPATIENT
Start: 2024-05-15 | End: 2024-05-15

## 2024-05-15 RX ORDER — ONDANSETRON 4 MG/1
4 TABLET, ORALLY DISINTEGRATING ORAL EVERY 8 HOURS PRN
Status: DISCONTINUED | OUTPATIENT
Start: 2024-05-15 | End: 2024-05-21 | Stop reason: HOSPADM

## 2024-05-15 RX ORDER — SODIUM CHLORIDE 9 MG/ML
INJECTION, SOLUTION INTRAVENOUS CONTINUOUS
Status: DISPENSED | OUTPATIENT
Start: 2024-05-15 | End: 2024-05-16

## 2024-05-15 RX ORDER — ACETAMINOPHEN 325 MG/1
650 TABLET ORAL EVERY 6 HOURS PRN
Status: DISCONTINUED | OUTPATIENT
Start: 2024-05-15 | End: 2024-05-21 | Stop reason: HOSPADM

## 2024-05-15 RX ORDER — BISACODYL 10 MG
10 SUPPOSITORY, RECTAL RECTAL DAILY PRN
Status: DISCONTINUED | OUTPATIENT
Start: 2024-05-15 | End: 2024-05-21 | Stop reason: HOSPADM

## 2024-05-15 RX ORDER — DIPHENHYDRAMINE HCL 25 MG
25 CAPSULE ORAL
Status: COMPLETED | OUTPATIENT
Start: 2024-05-15 | End: 2024-05-15

## 2024-05-15 RX ORDER — ATORVASTATIN CALCIUM 20 MG/1
20 TABLET, FILM COATED ORAL DAILY
Status: DISCONTINUED | OUTPATIENT
Start: 2024-05-16 | End: 2024-05-21 | Stop reason: HOSPADM

## 2024-05-15 RX ORDER — SODIUM CHLORIDE 0.9 % (FLUSH) 0.9 %
5-40 SYRINGE (ML) INJECTION PRN
Status: DISCONTINUED | OUTPATIENT
Start: 2024-05-15 | End: 2024-05-21 | Stop reason: HOSPADM

## 2024-05-15 RX ORDER — TRAMADOL HYDROCHLORIDE 50 MG/1
50 TABLET ORAL EVERY 6 HOURS PRN
Status: DISCONTINUED | OUTPATIENT
Start: 2024-05-15 | End: 2024-05-16

## 2024-05-15 RX ORDER — ACETAMINOPHEN 650 MG/1
650 SUPPOSITORY RECTAL EVERY 6 HOURS PRN
Status: DISCONTINUED | OUTPATIENT
Start: 2024-05-15 | End: 2024-05-21 | Stop reason: HOSPADM

## 2024-05-15 RX ADMIN — MORPHINE SULFATE 4 MG: 4 INJECTION, SOLUTION INTRAMUSCULAR; INTRAVENOUS at 16:34

## 2024-05-15 RX ADMIN — SODIUM CHLORIDE: 9 INJECTION, SOLUTION INTRAVENOUS at 19:48

## 2024-05-15 RX ADMIN — PIPERACILLIN AND TAZOBACTAM 3375 MG: 3; .375 INJECTION, POWDER, FOR SOLUTION INTRAVENOUS at 22:43

## 2024-05-15 RX ADMIN — DIPHENHYDRAMINE HYDROCHLORIDE 25 MG: 25 CAPSULE ORAL at 17:28

## 2024-05-15 RX ADMIN — SODIUM CHLORIDE, PRESERVATIVE FREE 10 ML: 5 INJECTION INTRAVENOUS at 22:44

## 2024-05-15 RX ADMIN — PIPERACILLIN AND TAZOBACTAM 4500 MG: 4; .5 INJECTION, POWDER, FOR SOLUTION INTRAVENOUS at 16:29

## 2024-05-15 RX ADMIN — RIVAROXABAN 2.5 MG: 2.5 TABLET, FILM COATED ORAL at 22:42

## 2024-05-15 RX ADMIN — VANCOMYCIN HYDROCHLORIDE 2000 MG: 10 INJECTION, POWDER, LYOPHILIZED, FOR SOLUTION INTRAVENOUS at 17:19

## 2024-05-15 RX ADMIN — TRAMADOL HYDROCHLORIDE 50 MG: 50 TABLET ORAL at 22:42

## 2024-05-15 ASSESSMENT — PAIN DESCRIPTION - ORIENTATION
ORIENTATION: RIGHT
ORIENTATION: RIGHT

## 2024-05-15 ASSESSMENT — PAIN - FUNCTIONAL ASSESSMENT
PAIN_FUNCTIONAL_ASSESSMENT: 0-10
PAIN_FUNCTIONAL_ASSESSMENT: ACTIVITIES ARE NOT PREVENTED

## 2024-05-15 ASSESSMENT — PAIN DESCRIPTION - DESCRIPTORS
DESCRIPTORS: BURNING
DESCRIPTORS: SHOOTING;BURNING

## 2024-05-15 ASSESSMENT — PAIN DESCRIPTION - LOCATION
LOCATION: TOE (COMMENT WHICH ONE)
LOCATION: FOOT
LOCATION: FOOT;TOE (COMMENT WHICH ONE)

## 2024-05-15 ASSESSMENT — PAIN DESCRIPTION - FREQUENCY: FREQUENCY: CONTINUOUS

## 2024-05-15 ASSESSMENT — PAIN DESCRIPTION - PAIN TYPE: TYPE: CHRONIC PAIN

## 2024-05-15 ASSESSMENT — PAIN SCALES - GENERAL
PAINLEVEL_OUTOF10: 10
PAINLEVEL_OUTOF10: 4
PAINLEVEL_OUTOF10: 10

## 2024-05-15 ASSESSMENT — PAIN DESCRIPTION - ONSET: ONSET: ON-GOING

## 2024-05-15 NOTE — H&P
Right foot gangrene  - podiatry and ID consulted  - IV abx: vanc, zosyn  - PRN pain control  - f/up cultures    PAD: left SFA and popliteal 7mm balloon angioplasty on 11/22/23 by Dr. Alex  - will check BLE arterial duplex with BERLIN  - consider vascular consult if above is abnormal  - continue xarelto and statin    MAXIMO: possibly 2/2 infection  - monitor BMP  - gentle fluids x24 hours    Thrombocytosis: likely 2/2 infection  - monitor plts      Anticipated Discharge: >2 days    DVT Prophylaxis:  []Lovenox  []Hep SQ  []SCDs  []Coumadin [x]DOAC  []On Heparin gtt     I have personally reviewed all pertinent labs, films and EKGs that have officially resulted. I reviewed available electronic documentation outlining the initial presentation as well as the emergency room physician's encounter. Time spent reviewing records, independently interpreting results, obtaining history from patient or caregiver, performing physical exam, ordering tests and medications, communicating with specialists, documenting in the chart, and coordinating overall care is 75 minutes    ANNIKA Ramirez

## 2024-05-15 NOTE — ED PROVIDER NOTES
2. Septicemia (HCC)    3. Peripheral arterial disease (HCC)        Disposition: admission     No follow-up provider specified.        Medication List        STOP taking these medications      amLODIPine 10 MG tablet  Commonly known as: NORVASC     hydrALAZINE 25 MG tablet  Commonly known as: APRESOLINE            ASK your doctor about these medications      acetaminophen 500 MG tablet  Commonly known as: TYLENOL     aspirin 81 MG EC tablet  Take 1 tablet by mouth daily     atorvastatin 20 MG tablet  Commonly known as: LIPITOR  Take 1 tablet by mouth daily     lactobacillus capsule  Take 1 capsule by mouth daily (with breakfast)     sennosides-docusate sodium 8.6-50 MG tablet  Commonly known as: SENOKOT-S  Take 1 tablet by mouth daily as needed for Constipation     traMADol 50 MG tablet  Commonly known as: ULTRAM     Xarelto 2.5 MG Tabs tablet  Generic drug: rivaroxaban               Dictation disclaimer:  Please note that this dictation was completed with [employer], the [employer] voice recognition software.  Quite often unanticipated grammatical, syntax, homophones, and other interpretive errors are inadvertently transcribed by the computer software.  Please disregard these errors.  Please excuse any errors that have escaped final proofreading.       Crystal Beatty PA  05/15/24 1914       Crystal Beatty PA  05/15/24 1921

## 2024-05-15 NOTE — ED NOTES
Bedside shift change report given to adi Stafford (oncoming nurse) by juany joshi (offgoing nurse). Report included the following information ED Encounter Summary, ED SBAR, MAR, Recent Results, and Neuro Assessment.

## 2024-05-15 NOTE — ED TRIAGE NOTES
Patient presented to the Emergency Dept with a complaint of necrosis to right toes same malodorous. Patient sates that he has been going to his provider from January and it has being worse.     Patient rates pain 10/10 on pain scale     Patient alert and oriented x 4, patient breathes freely on room air in nil cardiopulmonary distress

## 2024-05-15 NOTE — ED NOTES
Received pt to room 7 via triage. Pt awake on stretcher. Placed on cardiac monitor. C/o foot pain 9/10

## 2024-05-15 NOTE — ED NOTES
Initiated vancomycin infusion. Pt began sneezing and itching. Provider notified.     Pt given benadryl. Denies previous reactions or allergies to any medications/antibiotics that he knows of. Denies sob. No new rashes visualized

## 2024-05-16 ENCOUNTER — APPOINTMENT (OUTPATIENT)
Facility: HOSPITAL | Age: 64
DRG: 504 | End: 2024-05-16
Payer: MEDICARE

## 2024-05-16 ENCOUNTER — ANESTHESIA EVENT (OUTPATIENT)
Facility: HOSPITAL | Age: 64
End: 2024-05-16
Payer: MEDICARE

## 2024-05-16 PROBLEM — L03.115 CELLULITIS OF RIGHT LOWER EXTREMITY: Status: ACTIVE | Noted: 2024-05-16

## 2024-05-16 LAB
ACCESSION NUMBER, LLC1M: ABNORMAL
ACINETOBACTER CALCOAC BAUMANNII COMPLEX BY PCR: NOT DETECTED
ANION GAP SERPL CALC-SCNC: 5 MMOL/L (ref 3–18)
B FRAGILIS DNA BLD POS QL NAA+NON-PROBE: NOT DETECTED
BASOPHILS # BLD: 0.1 K/UL (ref 0–0.1)
BASOPHILS NFR BLD: 1 % (ref 0–2)
BIOFIRE TEST COMMENT: ABNORMAL
BUN SERPL-MCNC: 34 MG/DL (ref 7–18)
BUN/CREAT SERPL: 23 (ref 12–20)
C ALBICANS DNA BLD POS QL NAA+NON-PROBE: NOT DETECTED
C AURIS DNA BLD POS QL NAA+NON-PROBE: NOT DETECTED
C GATTII+NEOFOR DNA BLD POS QL NAA+N-PRB: NOT DETECTED
C GLABRATA DNA BLD POS QL NAA+NON-PROBE: NOT DETECTED
C KRUSEI DNA BLD POS QL NAA+NON-PROBE: NOT DETECTED
C PARAP DNA BLD POS QL NAA+NON-PROBE: NOT DETECTED
C TROPICLS DNA BLD POS QL NAA+NON-PROBE: NOT DETECTED
CALCIUM SERPL-MCNC: 8.6 MG/DL (ref 8.5–10.1)
CHLORIDE SERPL-SCNC: 107 MMOL/L (ref 100–111)
CO2 SERPL-SCNC: 26 MMOL/L (ref 21–32)
CREAT SERPL-MCNC: 1.45 MG/DL (ref 0.6–1.3)
DIFFERENTIAL METHOD BLD: ABNORMAL
E CLOAC COMP DNA BLD POS NAA+NON-PROBE: NOT DETECTED
E COLI DNA BLD POS QL NAA+NON-PROBE: NOT DETECTED
E FAECALIS DNA BLD POS QL NAA+NON-PROBE: NOT DETECTED
E FAECIUM DNA BLD POS QL NAA+NON-PROBE: NOT DETECTED
EKG ATRIAL RATE: 96 BPM
EKG DIAGNOSIS: NORMAL
EKG P AXIS: 103 DEGREES
EKG P-R INTERVAL: 148 MS
EKG Q-T INTERVAL: 328 MS
EKG QRS DURATION: 74 MS
EKG QTC CALCULATION (BAZETT): 414 MS
EKG R AXIS: 41 DEGREES
EKG T AXIS: 35 DEGREES
EKG VENTRICULAR RATE: 96 BPM
ENTEROBACTERALES DNA BLD POS NAA+N-PRB: NOT DETECTED
EOSINOPHIL # BLD: 0.4 K/UL (ref 0–0.4)
EOSINOPHIL NFR BLD: 4 % (ref 0–5)
ERYTHROCYTE [DISTWIDTH] IN BLOOD BY AUTOMATED COUNT: 14.4 % (ref 11.6–14.5)
GLUCOSE SERPL-MCNC: 101 MG/DL (ref 74–99)
GP B STREP DNA BLD POS QL NAA+NON-PROBE: NOT DETECTED
HAEM INFLU DNA BLD POS QL NAA+NON-PROBE: NOT DETECTED
HCT VFR BLD AUTO: 31.7 % (ref 36–48)
HGB BLD-MCNC: 10.5 G/DL (ref 13–16)
IMM GRANULOCYTES # BLD AUTO: 0.2 K/UL (ref 0–0.04)
IMM GRANULOCYTES NFR BLD AUTO: 2 % (ref 0–0.5)
K OXYTOCA DNA BLD POS QL NAA+NON-PROBE: NOT DETECTED
KLEBSIELLA SP DNA BLD POS QL NAA+NON-PRB: NOT DETECTED
KLEBSIELLA SP DNA BLD POS QL NAA+NON-PRB: NOT DETECTED
L MONOCYTOG DNA BLD POS QL NAA+NON-PROBE: NOT DETECTED
LYMPHOCYTES # BLD: 1.5 K/UL (ref 0.9–3.6)
LYMPHOCYTES NFR BLD: 15 % (ref 21–52)
MCH RBC QN AUTO: 30.4 PG (ref 24–34)
MCHC RBC AUTO-ENTMCNC: 33.1 G/DL (ref 31–37)
MCV RBC AUTO: 91.9 FL (ref 78–100)
MECA+MECC ISLT/SPM QL: DETECTED
MONOCYTES # BLD: 0.6 K/UL (ref 0.05–1.2)
MONOCYTES NFR BLD: 6 % (ref 3–10)
N MEN DNA BLD POS QL NAA+NON-PROBE: NOT DETECTED
NEUTS SEG # BLD: 7.7 K/UL (ref 1.8–8)
NEUTS SEG NFR BLD: 74 % (ref 40–73)
NRBC # BLD: 0 K/UL (ref 0–0.01)
NRBC BLD-RTO: 0 PER 100 WBC
P AERUGINOSA DNA BLD POS NAA+NON-PROBE: NOT DETECTED
PLATELET # BLD AUTO: 462 K/UL (ref 135–420)
PMV BLD AUTO: 9.6 FL (ref 9.2–11.8)
POTASSIUM SERPL-SCNC: 4 MMOL/L (ref 3.5–5.5)
PROTEUS SP DNA BLD POS QL NAA+NON-PROBE: NOT DETECTED
RBC # BLD AUTO: 3.45 M/UL (ref 4.35–5.65)
RESISTANT GENE TARGETS: ABNORMAL
S AUREUS DNA BLD POS QL NAA+NON-PROBE: NOT DETECTED
S AUREUS+CONS DNA BLD POS NAA+NON-PROBE: DETECTED
S EPIDERMIDIS DNA BLD POS QL NAA+NON-PRB: DETECTED
S LUGDUNENSIS DNA BLD POS QL NAA+NON-PRB: NOT DETECTED
S MALTOPHILIA DNA BLD POS QL NAA+NON-PRB: NOT DETECTED
S MARCESCENS DNA BLD POS NAA+NON-PROBE: NOT DETECTED
S PNEUM DNA BLD POS QL NAA+NON-PROBE: NOT DETECTED
S PYO DNA BLD POS QL NAA+NON-PROBE: NOT DETECTED
SALMONELLA DNA BLD POS QL NAA+NON-PROBE: NOT DETECTED
SODIUM SERPL-SCNC: 138 MMOL/L (ref 136–145)
STREPTOCOCCUS DNA BLD POS NAA+NON-PROBE: NOT DETECTED
VANCOMYCIN SERPL-MCNC: 16.6 UG/ML (ref 5–40)
WBC # BLD AUTO: 10.4 K/UL (ref 4.6–13.2)

## 2024-05-16 PROCEDURE — 85025 COMPLETE CBC W/AUTO DIFF WBC: CPT

## 2024-05-16 PROCEDURE — 97165 OT EVAL LOW COMPLEX 30 MIN: CPT

## 2024-05-16 PROCEDURE — 93010 ELECTROCARDIOGRAM REPORT: CPT | Performed by: INTERNAL MEDICINE

## 2024-05-16 PROCEDURE — 99232 SBSQ HOSP IP/OBS MODERATE 35: CPT | Performed by: STUDENT IN AN ORGANIZED HEALTH CARE EDUCATION/TRAINING PROGRAM

## 2024-05-16 PROCEDURE — 80048 BASIC METABOLIC PNL TOTAL CA: CPT

## 2024-05-16 PROCEDURE — 6360000002 HC RX W HCPCS: Performed by: PHYSICIAN ASSISTANT

## 2024-05-16 PROCEDURE — 2580000003 HC RX 258: Performed by: PHYSICIAN ASSISTANT

## 2024-05-16 PROCEDURE — 2580000003 HC RX 258: Performed by: INTERNAL MEDICINE

## 2024-05-16 PROCEDURE — 93922 UPR/L XTREMITY ART 2 LEVELS: CPT

## 2024-05-16 PROCEDURE — 6360000002 HC RX W HCPCS: Performed by: INTERNAL MEDICINE

## 2024-05-16 PROCEDURE — 80202 ASSAY OF VANCOMYCIN: CPT

## 2024-05-16 PROCEDURE — 97161 PT EVAL LOW COMPLEX 20 MIN: CPT

## 2024-05-16 PROCEDURE — 94761 N-INVAS EAR/PLS OXIMETRY MLT: CPT

## 2024-05-16 PROCEDURE — 36415 COLL VENOUS BLD VENIPUNCTURE: CPT

## 2024-05-16 PROCEDURE — 6370000000 HC RX 637 (ALT 250 FOR IP): Performed by: INTERNAL MEDICINE

## 2024-05-16 PROCEDURE — 1100000000 HC RM PRIVATE

## 2024-05-16 RX ORDER — OXYCODONE HYDROCHLORIDE AND ACETAMINOPHEN 5; 325 MG/1; MG/1
1 TABLET ORAL EVERY 6 HOURS PRN
Status: DISCONTINUED | OUTPATIENT
Start: 2024-05-16 | End: 2024-05-18

## 2024-05-16 RX ADMIN — SODIUM CHLORIDE, PRESERVATIVE FREE 10 ML: 5 INJECTION INTRAVENOUS at 21:32

## 2024-05-16 RX ADMIN — OXYCODONE HYDROCHLORIDE AND ACETAMINOPHEN 1 TABLET: 5; 325 TABLET ORAL at 03:31

## 2024-05-16 RX ADMIN — VANCOMYCIN HYDROCHLORIDE 750 MG: 750 INJECTION, POWDER, LYOPHILIZED, FOR SOLUTION INTRAVENOUS at 08:22

## 2024-05-16 RX ADMIN — PIPERACILLIN AND TAZOBACTAM 3375 MG: 3; .375 INJECTION, POWDER, FOR SOLUTION INTRAVENOUS at 09:52

## 2024-05-16 RX ADMIN — PIPERACILLIN AND TAZOBACTAM 3375 MG: 3; .375 INJECTION, POWDER, FOR SOLUTION INTRAVENOUS at 18:37

## 2024-05-16 RX ADMIN — OXYCODONE HYDROCHLORIDE AND ACETAMINOPHEN 1 TABLET: 5; 325 TABLET ORAL at 09:53

## 2024-05-16 RX ADMIN — SODIUM CHLORIDE, PRESERVATIVE FREE 10 ML: 5 INJECTION INTRAVENOUS at 08:27

## 2024-05-16 RX ADMIN — OXYCODONE HYDROCHLORIDE AND ACETAMINOPHEN 1 TABLET: 5; 325 TABLET ORAL at 18:37

## 2024-05-16 ASSESSMENT — PAIN DESCRIPTION - ORIENTATION
ORIENTATION: RIGHT

## 2024-05-16 ASSESSMENT — PAIN - FUNCTIONAL ASSESSMENT
PAIN_FUNCTIONAL_ASSESSMENT: ACTIVITIES ARE NOT PREVENTED

## 2024-05-16 ASSESSMENT — PAIN DESCRIPTION - FREQUENCY: FREQUENCY: CONTINUOUS

## 2024-05-16 ASSESSMENT — PAIN DESCRIPTION - DESCRIPTORS
DESCRIPTORS: ACHING;DISCOMFORT
DESCRIPTORS: ACHING;DISCOMFORT
DESCRIPTORS: BURNING

## 2024-05-16 ASSESSMENT — PAIN DESCRIPTION - LOCATION
LOCATION: FOOT
LOCATION: FOOT
LOCATION: TOE (COMMENT WHICH ONE);FOOT

## 2024-05-16 ASSESSMENT — PAIN DESCRIPTION - PAIN TYPE: TYPE: CHRONIC PAIN

## 2024-05-16 ASSESSMENT — PAIN SCALES - GENERAL
PAINLEVEL_OUTOF10: 6
PAINLEVEL_OUTOF10: 4
PAINLEVEL_OUTOF10: 7
PAINLEVEL_OUTOF10: 0

## 2024-05-16 ASSESSMENT — PAIN DESCRIPTION - ONSET: ONSET: ON-GOING

## 2024-05-16 NOTE — PLAN OF CARE
Problem: Discharge Planning  Goal: Discharge to home or other facility with appropriate resources  Outcome: Progressing  Flowsheets (Taken 5/15/2024 2241 by Sydnie Macias RN)  Discharge to home or other facility with appropriate resources: Identify barriers to discharge with patient and caregiver     Problem: Pain  Goal: Verbalizes/displays adequate comfort level or baseline comfort level  5/16/2024 1154 by Danette Rivas RN  Outcome: Progressing  Flowsheets (Taken 5/16/2024 0523 by Sydnie Macias RN)  Verbalizes/displays adequate comfort level or baseline comfort level:   Encourage patient to monitor pain and request assistance   Assess pain using appropriate pain scale   Administer analgesics based on type and severity of pain and evaluate response   Implement non-pharmacological measures as appropriate and evaluate response  5/16/2024 0523 by Sydnie Macias RN  Flowsheets (Taken 5/16/2024 0523)  Verbalizes/displays adequate comfort level or baseline comfort level:   Encourage patient to monitor pain and request assistance   Assess pain using appropriate pain scale   Administer analgesics based on type and severity of pain and evaluate response   Implement non-pharmacological measures as appropriate and evaluate response  5/16/2024 0521 by Sydnie Macias RN  Outcome: Progressing     Problem: Safety - Adult  Goal: Free from fall injury  5/16/2024 1154 by Danette Rivas RN  Outcome: Progressing  Flowsheets (Taken 5/16/2024 0523 by Sydnie Macias RN)  Free From Fall Injury: Instruct family/caregiver on patient safety  5/16/2024 0523 by Sydnie Macias RN  Flowsheets (Taken 5/16/2024 0523)  Free From Fall Injury: Instruct family/caregiver on patient safety  5/16/2024 0521 by Sydnie Macias RN  Outcome: Progressing     Problem: Chronic Conditions and Co-morbidities  Goal: Patient's chronic conditions and co-morbidity symptoms are monitored and maintained or

## 2024-05-16 NOTE — CARE COORDINATION
05/16/24 0938   Service Assessment   Patient Orientation Alert and Oriented   Cognition Alert   History Provided By Patient   Primary Caregiver Self   Accompanied By/Relationship none   Support Systems Spouse/Significant Other;Parent;Family Members;Friends/Neighbors   Patient's Healthcare Decision Maker is: Named in Scanned ACP Document   PCP Verified by CM Yes   Last Visit to PCP Within last 3 months   Prior Functional Level Independent in ADLs/IADLs   Current Functional Level Independent in ADLs/IADLs   Can patient return to prior living arrangement Yes  (pt states though that he will stay with his mom for a few days after discharge.)   Ability to make needs known: Good   Family able to assist with home care needs: Yes   Would you like for me to discuss the discharge plan with any other family members/significant others, and if so, who? No   Financial Resources Medicaid;Medicare   Community Resources None   Social/Functional History   Lives With Spouse   Type of Home House   Home Layout One level   Home Access Stairs to enter with rails   Entrance Stairs - Number of Steps \"a few steps\"   Bathroom Shower/Tub Tub/Shower unit   Bathroom Toilet Standard   Bathroom Equipment Shower chair   Bathroom Accessibility Accessible   Home Equipment Cane;Walker - Standard  (\"I have them, but I don't use them:\")   Receives Help From Family   ADL Assistance Independent   Homemaking Assistance Independent   Homemaking Responsibilities Yes   Ambulation Assistance Independent   Transfer Assistance Independent   Active  No  (\"I don't have a drivers license.\")   Patient's  Info \"I try not to go anywhere.\"   Occupation On disability   Discharge Planning   Type of Residence House   Living Arrangements Spouse/Significant Other;Family Members   Current Services Prior To Admission None   Potential Assistance Needed N/A   DME Ordered? No   Potential Assistance Purchasing Medications No   Type of Home Care Services None

## 2024-05-16 NOTE — CONSULTS
[unfilled]   Consult    Patient: Hermes Narayan MRN: [id number]  SSN: [id number]    YOB: 1960  Age: 63 y.o.  Sex: male      Subjective:      Hermes Narayan is a 63 y.o. male who is being seen for gangrene and infection right foot..    Past Medical History:   Diagnosis Date    Arthritis     Cellulitis 2021    legs    Hypercholesterolemia     Hypertension      Past Surgical History:   Procedure Laterality Date    INVASIVE VASCULAR N/A 2023    Angiography lower ext left performed by Gaetano Alex MD at [facility] CARDIAC CATH LAB    INVASIVE VASCULAR N/A 2023    Pta femoral popliteal artery performed by Gaetano Alex MD at [facility] CARDIAC CATH LAB    INVASIVE VASCULAR N/A 2023    Aortagram abdominal performed by Gaetano Alex MD at [facility] CARDIAC CATH LAB    ORTHOPEDIC SURGERY      TOE AMPUTATION Left 2023    LEFT SECOND TOE AMPUTATION performed by Marta Banerjee DPM at [facility] MAIN OR    TOE AMPUTATION Left 2023    LEFT 2ND TOE AMPUTATION performed by Teja Stewart DPM at [facility] MAIN OR      Family History   Problem Relation Age of Onset    Diabetes Maternal Grandmother     No Known Problems Father     Diabetes Mother      Social History     Tobacco Use    Smoking status: Former     Current packs/day: 0.00     Types: Cigarettes     Quit date: 3/26/1998     Years since quittin.1    Smokeless tobacco: Never    Tobacco comments:     Quit smoking: AROUND WIFE WHO SMOKES/2ND HAND SMOKE   Substance Use Topics    Alcohol use: Yes     Alcohol/week: 18.0 standard drinks of alcohol      Current Facility-Administered Medications   Medication Dose Route Frequency Provider Last Rate Last Admin    oxyCODONE-acetaminophen (PERCOCET) 5-325 MG per tablet 1 tablet  1 tablet Oral Q6H PRN Eliseo Brown MD   1 tablet at 24 0953    piperacillin-tazobactam (ZOSYN) 3,375 mg in sodium chloride 0.9 % 50 mL IVPB (mini-bag)  3,375 mg IntraVENous Q8H Mee Barahona 
Consult received.   D/w dr. Stewart. Wet necrosis of right foot toes with malodorous drainage. Rapidly progressing infection. Recent BERLIN 1. Evaluated by vascular surgery as outpatient.   Recommend pip/tazo, vancomycin (? Allergic reaction to vancomycin in ed. S/p benadryl. Has tolerated vancomycin while in hospital 11/2023.   Will d/c vancomycin if no beta lactam resistant gram positives noted in wound culture  Follow up wound cx. Modify antibiotics accordingly  Follow up podiatry, vascular surgery recommendations  Full consult to follow. thanks    
70   Temp 97.8 °F (36.6 °C) (Oral)   Resp 16   Ht 1.803 m (5' 11\")   Wt 96 kg (211 lb 11.2 oz)   SpO2 99%   BMI 29.53 kg/m²     ROS: 12 point ROS obtained in details. Pertinent positives as mentioned in HPI,   otherwise negative    Physical Exam:    General: NAD, appears stated age, alert    Eyes:sclera is non-icteric  HENT: normocephalic/atraumatic, moist mucus membranes  Respiratory: CTA with no signs of respiratory distress  Cardiovascular: RRR,  no cyanosis or peripheral edema of extremities  GI: soft, non-tender, normal bowel sounds  Neuro: moves all extremities, no focal deficits, normal speech  Psych: appropriate mood and affect, no visual or auditory hallucinations  Skin: right foot with malodorous necrotic toes (first through fourth toes), erythema right foot, no significant warmth    Labs: Results:   Chemistry Recent Labs     05/15/24  1616 05/16/24  0428   GLUCOSE 105* 101*    138   K 4.1 4.0    107   CO2 29 26   BUN 26* 34*   CREATININE 1.50* 1.45*      CBC w/Diff Recent Labs     05/15/24  1616 05/16/24  0428   WBC 12.9 10.4   RBC 3.85* 3.45*   HGB 11.4* 10.5*   HCT 34.8* 31.7*   * 462*      Microbiology Results       Procedure Component Value Units Date/Time    Culture, Blood 2 [[id number]] Collected: 05/15/24 1626    Order Status: Completed Specimen: Blood Updated: 05/16/24 0648     Special Requests NO SPECIAL REQUESTS        Culture NO GROWTH AFTER 14 HOURS       Culture, Blood 1 [[id number]] Collected: 05/15/24 1616    Order Status: Completed Specimen: Blood Updated: 05/16/24 0648     Special Requests NO SPECIAL REQUESTS        Culture NO GROWTH AFTER 14 HOURS       Culture, Wound [[id number]] Collected: 05/15/24 1616    Order Status: Completed Specimen: Toe Updated: 05/16/24 1341     Special Requests NO SPECIAL REQUESTS        Gram Stain FEW WBCS SEEN               4+ GRAM POSITIVE COCCI IN PAIRS CHAINS AND CLUSTERS            3+ Gram negative rods         3+ GRAM POSITIVE

## 2024-05-16 NOTE — PLAN OF CARE
Problem: Discharge Planning  Goal: Discharge to home or other facility with appropriate resources  5/16/2024 1959 by Mirna Hamilton RN  Outcome: Progressing  5/16/2024 1154 by Danette Rivas RN  Outcome: Progressing  Flowsheets (Taken 5/15/2024 2241 by Sydnie Macias RN)  Discharge to home or other facility with appropriate resources: Identify barriers to discharge with patient and caregiver     Problem: Pain  Goal: Verbalizes/displays adequate comfort level or baseline comfort level  5/16/2024 1959 by Mirna Hamilton RN  Outcome: Progressing  5/16/2024 1154 by Danette Rivas RN  Outcome: Progressing  Flowsheets (Taken 5/16/2024 0523 by Syndie Macias RN)  Verbalizes/displays adequate comfort level or baseline comfort level:   Encourage patient to monitor pain and request assistance   Assess pain using appropriate pain scale   Administer analgesics based on type and severity of pain and evaluate response   Implement non-pharmacological measures as appropriate and evaluate response     Problem: Safety - Adult  Goal: Free from fall injury  5/16/2024 1959 by Mirna Hamilton RN  Outcome: Progressing  5/16/2024 1154 by Danette Rivas RN  Outcome: Progressing  Flowsheets (Taken 5/16/2024 0523 by Sydnie Macias RN)  Free From Fall Injury: Instruct family/caregiver on patient safety     Problem: Chronic Conditions and Co-morbidities  Goal: Patient's chronic conditions and co-morbidity symptoms are monitored and maintained or improved  5/16/2024 1959 by Mirna Hamilton RN  Outcome: Progressing  5/16/2024 1154 by Danette Rivas RN  Outcome: Progressing  Flowsheets (Taken 5/16/2024 0523 by Sydnie Macias RN)  Care Plan - Patient's Chronic Conditions and Co-Morbidity Symptoms are Monitored and Maintained or Improved:   Monitor and assess patient's chronic conditions and comorbid symptoms for stability, deterioration, or improvement   Collaborate with multidisciplinary

## 2024-05-16 NOTE — PLAN OF CARE
Problem: Pain  Goal: Verbalizes/displays adequate comfort level or baseline comfort level  5/16/2024 0523 by Sydnie Macias RN  Flowsheets (Taken 5/16/2024 0523)  Verbalizes/displays adequate comfort level or baseline comfort level:   Encourage patient to monitor pain and request assistance   Assess pain using appropriate pain scale   Administer analgesics based on type and severity of pain and evaluate response   Implement non-pharmacological measures as appropriate and evaluate response  5/16/2024 0521 by Sydnie Macias RN  Outcome: Progressing     Problem: Safety - Adult  Goal: Free from fall injury  5/16/2024 0523 by Sydnie Macias RN  Flowsheets (Taken 5/16/2024 0523)  Free From Fall Injury: Instruct family/caregiver on patient safety  5/16/2024 0521 by Sydnie Macias RN  Outcome: Progressing     Problem: Chronic Conditions and Co-morbidities  Goal: Patient's chronic conditions and co-morbidity symptoms are monitored and maintained or improved  5/16/2024 0523 by Sydnie Macias RN  Flowsheets (Taken 5/16/2024 0523)  Care Plan - Patient's Chronic Conditions and Co-Morbidity Symptoms are Monitored and Maintained or Improved:   Monitor and assess patient's chronic conditions and comorbid symptoms for stability, deterioration, or improvement   Collaborate with multidisciplinary team to address chronic and comorbid conditions and prevent exacerbation or deterioration   Update acute care plan with appropriate goals if chronic or comorbid symptoms are exacerbated and prevent overall improvement and discharge  5/16/2024 0521 by Sydnie Macias RN  Outcome: Progressing

## 2024-05-16 NOTE — H&P (VIEW-ONLY)
[unfilled]   Consult    Patient: Hermes Narayan MRN: [id number]  SSN: [id number]    YOB: 1960  Age: 63 y.o.  Sex: male      Subjective:      Hermes Narayan is a 63 y.o. male who is being seen for gangrene and infection right foot..    Past Medical History:   Diagnosis Date    Arthritis     Cellulitis 2021    legs    Hypercholesterolemia     Hypertension      Past Surgical History:   Procedure Laterality Date    INVASIVE VASCULAR N/A 2023    Angiography lower ext left performed by Gaetano Alex MD at [facility]    INVASIVE VASCULAR N/A 2023    Pta femoral popliteal artery performed by Gaetano Alex MD at [facility]    INVASIVE VASCULAR N/A 2023    Aortagram abdominal performed by Gaetano Alex MD at [facility]    ORTHOPEDIC SURGERY      TOE AMPUTATION Left 2023    LEFT SECOND TOE AMPUTATION performed by Marta Banerjee DPM at [facility]    TOE AMPUTATION Left 2023    LEFT 2ND TOE AMPUTATION performed by Teja Stewart DPM at [facility]      Family History   Problem Relation Age of Onset    Diabetes Maternal Grandmother     No Known Problems Father     Diabetes Mother      Social History     Tobacco Use    Smoking status: Former     Current packs/day: 0.00     Types: Cigarettes     Quit date: 3/26/1998     Years since quittin.1    Smokeless tobacco: Never    Tobacco comments:     Quit smoking: AROUND WIFE WHO SMOKES/2ND HAND SMOKE   Substance Use Topics    Alcohol use: Yes     Alcohol/week: 18.0 standard drinks of alcohol      Current Facility-Administered Medications   Medication Dose Route Frequency Provider Last Rate Last Admin    oxyCODONE-acetaminophen (PERCOCET) 5-325 MG per tablet 1 tablet  1 tablet Oral Q6H PRN Eliseo Brown MD   1 tablet at 24 0953    piperacillin-tazobactam (ZOSYN) 3,375 mg in sodium chloride 0.9 % 50 mL IVPB (mini-bag)  3,375 mg IntraVENous Q8H Mee Barahona

## 2024-05-17 ENCOUNTER — ANESTHESIA (OUTPATIENT)
Facility: HOSPITAL | Age: 64
End: 2024-05-17
Payer: MEDICARE

## 2024-05-17 LAB
ANION GAP SERPL CALC-SCNC: 7 MMOL/L (ref 3–18)
BACTERIA SPEC CULT: ABNORMAL
BASOPHILS # BLD: 0.1 K/UL (ref 0–0.1)
BASOPHILS NFR BLD: 1 % (ref 0–2)
BUN SERPL-MCNC: 19 MG/DL (ref 7–18)
BUN/CREAT SERPL: 19 (ref 12–20)
CALCIUM SERPL-MCNC: 8.7 MG/DL (ref 8.5–10.1)
CHLORIDE SERPL-SCNC: 106 MMOL/L (ref 100–111)
CO2 SERPL-SCNC: 23 MMOL/L (ref 21–32)
CREAT SERPL-MCNC: 1 MG/DL (ref 0.6–1.3)
DIFFERENTIAL METHOD BLD: ABNORMAL
ECHO BSA: 2.19 M2
EOSINOPHIL # BLD: 0.3 K/UL (ref 0–0.4)
EOSINOPHIL NFR BLD: 3 % (ref 0–5)
ERYTHROCYTE [DISTWIDTH] IN BLOOD BY AUTOMATED COUNT: 14.1 % (ref 11.6–14.5)
GLUCOSE SERPL-MCNC: 99 MG/DL (ref 74–99)
GRAM STN SPEC: ABNORMAL
HCT VFR BLD AUTO: 31.8 % (ref 36–48)
HGB BLD-MCNC: 10.3 G/DL (ref 13–16)
IMM GRANULOCYTES # BLD AUTO: 0.1 K/UL (ref 0–0.04)
IMM GRANULOCYTES NFR BLD AUTO: 1 % (ref 0–0.5)
LYMPHOCYTES # BLD: 1.2 K/UL (ref 0.9–3.6)
LYMPHOCYTES NFR BLD: 11 % (ref 21–52)
MCH RBC QN AUTO: 29.8 PG (ref 24–34)
MCHC RBC AUTO-ENTMCNC: 32.4 G/DL (ref 31–37)
MCV RBC AUTO: 91.9 FL (ref 78–100)
MONOCYTES # BLD: 0.6 K/UL (ref 0.05–1.2)
MONOCYTES NFR BLD: 6 % (ref 3–10)
NEUTS SEG # BLD: 8.6 K/UL (ref 1.8–8)
NEUTS SEG NFR BLD: 79 % (ref 40–73)
NRBC # BLD: 0 K/UL (ref 0–0.01)
NRBC BLD-RTO: 0 PER 100 WBC
PLATELET # BLD AUTO: 459 K/UL (ref 135–420)
PMV BLD AUTO: 9.7 FL (ref 9.2–11.8)
POTASSIUM SERPL-SCNC: 4.1 MMOL/L (ref 3.5–5.5)
RBC # BLD AUTO: 3.46 M/UL (ref 4.35–5.65)
SERVICE CMNT-IMP: ABNORMAL
SERVICE CMNT-IMP: ABNORMAL
SODIUM SERPL-SCNC: 136 MMOL/L (ref 136–145)
VAS LEFT ABI: 1.03
VAS LEFT ARM BP: 119 MMHG
VAS LEFT CFA DIST PSV: 90.8 CM/S
VAS LEFT CFA MID PSV: 81.1 CM/S
VAS LEFT DORSALIS PEDIS BP: 123 MMHG
VAS LEFT PERONEAL MID PSV: 54.1 CM/S
VAS LEFT PFA PROX PSV: 114.7 CM/S
VAS LEFT POP A DIST PSV: 167.8 CM/S
VAS LEFT POP A DIST VEL RATIO: 0.42
VAS LEFT POP A MID PSV: 395 CM/S
VAS LEFT PTA BP: 123 MMHG
VAS LEFT PTA DIST PSV: 63.7 CM/S
VAS LEFT PTA MID PSV: 70.1 CM/S
VAS LEFT SFA DIST PSV: 96.9 CM/S
VAS LEFT SFA DIST VEL RATIO: 0.84
VAS LEFT SFA MID PSV: 114.7 CM/S
VAS LEFT SFA MID VEL RATIO: 1.36
VAS LEFT SFA PROX PSV: 84.3 CM/S
VAS LEFT SFA PROX VEL RATIO: 0.93
VAS LEFT TBI: 0.55
VAS LEFT TOE PRESSURE: 65 MMHG
VAS RIGHT ABI: 1.24
VAS RIGHT ARM BP: 111 MMHG
VAS RIGHT ATA PROX PSV: 76.2 CM/S
VAS RIGHT CFA DIST PSV: 89.1 CM/S
VAS RIGHT CFA MID PSV: 108.2 CM/S
VAS RIGHT DORSALIS PEDIS BP: 136 MMHG
VAS RIGHT PFA PROX PSV: 108.8 CM/S
VAS RIGHT POP A DIST PSV: 97.2 CM/S
VAS RIGHT POP A DIST VEL RATIO: 1.13
VAS RIGHT POP A MID PSV: 85.9 CM/S
VAS RIGHT PTA BP: 148 MMHG
VAS RIGHT PTA DIST PSV: 76.3 CM/S
VAS RIGHT PTA MID PSV: 98.9 CM/S
VAS RIGHT SFA DIST PSV: 82.7 CM/S
VAS RIGHT SFA DIST VEL RATIO: 0.9
VAS RIGHT SFA MID PSV: 92.4 CM/S
VAS RIGHT SFA MID VEL RATIO: 0.8
VAS RIGHT SFA PROX PSV: 112.7 CM/S
VAS RIGHT SFA PROX VEL RATIO: 1
VAS RIGHT TBI: 0
VAS RIGHT TOE PRESSURE: 0 MMHG
WBC # BLD AUTO: 10.9 K/UL (ref 4.6–13.2)

## 2024-05-17 PROCEDURE — 2709999900 HC NON-CHARGEABLE SUPPLY: Performed by: PODIATRIST

## 2024-05-17 PROCEDURE — 93925 LOWER EXTREMITY STUDY: CPT | Performed by: SURGERY

## 2024-05-17 PROCEDURE — 2580000003 HC RX 258: Performed by: INTERNAL MEDICINE

## 2024-05-17 PROCEDURE — 3700000000 HC ANESTHESIA ATTENDED CARE: Performed by: PODIATRIST

## 2024-05-17 PROCEDURE — 6360000002 HC RX W HCPCS: Performed by: NURSE ANESTHETIST, CERTIFIED REGISTERED

## 2024-05-17 PROCEDURE — 6370000000 HC RX 637 (ALT 250 FOR IP): Performed by: NURSE ANESTHETIST, CERTIFIED REGISTERED

## 2024-05-17 PROCEDURE — 6360000002 HC RX W HCPCS: Performed by: INTERNAL MEDICINE

## 2024-05-17 PROCEDURE — 0Y6P0Z0 DETACHMENT AT RIGHT 1ST TOE, COMPLETE, OPEN APPROACH: ICD-10-PCS | Performed by: PODIATRIST

## 2024-05-17 PROCEDURE — 7100000000 HC PACU RECOVERY - FIRST 15 MIN: Performed by: PODIATRIST

## 2024-05-17 PROCEDURE — 0Y6R0Z0 DETACHMENT AT RIGHT 2ND TOE, COMPLETE, OPEN APPROACH: ICD-10-PCS | Performed by: PODIATRIST

## 2024-05-17 PROCEDURE — 0Y6V0Z0 DETACHMENT AT RIGHT 4TH TOE, COMPLETE, OPEN APPROACH: ICD-10-PCS | Performed by: PODIATRIST

## 2024-05-17 PROCEDURE — 3600000002 HC SURGERY LEVEL 2 BASE: Performed by: PODIATRIST

## 2024-05-17 PROCEDURE — 2500000003 HC RX 250 WO HCPCS: Performed by: PODIATRIST

## 2024-05-17 PROCEDURE — C9290 INJ, BUPIVACAINE LIPOSOME: HCPCS | Performed by: PODIATRIST

## 2024-05-17 PROCEDURE — A4217 STERILE WATER/SALINE, 500 ML: HCPCS | Performed by: PODIATRIST

## 2024-05-17 PROCEDURE — 6370000000 HC RX 637 (ALT 250 FOR IP): Performed by: INTERNAL MEDICINE

## 2024-05-17 PROCEDURE — 2580000003 HC RX 258: Performed by: PHYSICIAN ASSISTANT

## 2024-05-17 PROCEDURE — 80048 BASIC METABOLIC PNL TOTAL CA: CPT

## 2024-05-17 PROCEDURE — 7100000001 HC PACU RECOVERY - ADDTL 15 MIN: Performed by: PODIATRIST

## 2024-05-17 PROCEDURE — 87185 SC STD ENZYME DETCJ PER NZM: CPT

## 2024-05-17 PROCEDURE — 6360000002 HC RX W HCPCS: Performed by: PODIATRIST

## 2024-05-17 PROCEDURE — 87076 CULTURE ANAEROBE IDENT EACH: CPT

## 2024-05-17 PROCEDURE — 6370000000 HC RX 637 (ALT 250 FOR IP): Performed by: PODIATRIST

## 2024-05-17 PROCEDURE — 1100000000 HC RM PRIVATE

## 2024-05-17 PROCEDURE — 2500000003 HC RX 250 WO HCPCS: Performed by: NURSE ANESTHETIST, CERTIFIED REGISTERED

## 2024-05-17 PROCEDURE — 6370000000 HC RX 637 (ALT 250 FOR IP): Performed by: PHYSICIAN ASSISTANT

## 2024-05-17 PROCEDURE — 94761 N-INVAS EAR/PLS OXIMETRY MLT: CPT

## 2024-05-17 PROCEDURE — 2580000003 HC RX 258: Performed by: PODIATRIST

## 2024-05-17 PROCEDURE — 87070 CULTURE OTHR SPECIMN AEROBIC: CPT

## 2024-05-17 PROCEDURE — 99232 SBSQ HOSP IP/OBS MODERATE 35: CPT | Performed by: STUDENT IN AN ORGANIZED HEALTH CARE EDUCATION/TRAINING PROGRAM

## 2024-05-17 PROCEDURE — 0QBN0Z2 EXCISION OF RIGHT METATARSAL, SESAMOID BONE(S) 1ST TOE, OPEN APPROACH: ICD-10-PCS | Performed by: PODIATRIST

## 2024-05-17 PROCEDURE — 0Y6T0Z0 DETACHMENT AT RIGHT 3RD TOE, COMPLETE, OPEN APPROACH: ICD-10-PCS | Performed by: PODIATRIST

## 2024-05-17 PROCEDURE — 36415 COLL VENOUS BLD VENIPUNCTURE: CPT

## 2024-05-17 PROCEDURE — 85025 COMPLETE CBC W/AUTO DIFF WBC: CPT

## 2024-05-17 PROCEDURE — 87075 CULTR BACTERIA EXCEPT BLOOD: CPT

## 2024-05-17 PROCEDURE — 88305 TISSUE EXAM BY PATHOLOGIST: CPT

## 2024-05-17 PROCEDURE — 3700000001 HC ADD 15 MINUTES (ANESTHESIA): Performed by: PODIATRIST

## 2024-05-17 PROCEDURE — 87205 SMEAR GRAM STAIN: CPT

## 2024-05-17 PROCEDURE — 3600000012 HC SURGERY LEVEL 2 ADDTL 15MIN: Performed by: PODIATRIST

## 2024-05-17 RX ORDER — SODIUM CHLORIDE 9 MG/ML
INJECTION, SOLUTION INTRAVENOUS CONTINUOUS
Status: DISCONTINUED | OUTPATIENT
Start: 2024-05-17 | End: 2024-05-17 | Stop reason: HOSPADM

## 2024-05-17 RX ORDER — LIDOCAINE HYDROCHLORIDE 10 MG/ML
1 INJECTION, SOLUTION EPIDURAL; INFILTRATION; INTRACAUDAL; PERINEURAL
Status: DISCONTINUED | OUTPATIENT
Start: 2024-05-17 | End: 2024-05-17 | Stop reason: HOSPADM

## 2024-05-17 RX ORDER — MIDAZOLAM HYDROCHLORIDE 1 MG/ML
INJECTION INTRAMUSCULAR; INTRAVENOUS PRN
Status: DISCONTINUED | OUTPATIENT
Start: 2024-05-17 | End: 2024-05-17 | Stop reason: SDUPTHER

## 2024-05-17 RX ORDER — SODIUM CHLORIDE, SODIUM LACTATE, POTASSIUM CHLORIDE, CALCIUM CHLORIDE 600; 310; 30; 20 MG/100ML; MG/100ML; MG/100ML; MG/100ML
INJECTION, SOLUTION INTRAVENOUS CONTINUOUS
Status: DISCONTINUED | OUTPATIENT
Start: 2024-05-17 | End: 2024-05-17 | Stop reason: HOSPADM

## 2024-05-17 RX ORDER — ONDANSETRON 2 MG/ML
4 INJECTION INTRAMUSCULAR; INTRAVENOUS
Status: DISCONTINUED | OUTPATIENT
Start: 2024-05-17 | End: 2024-05-17 | Stop reason: HOSPADM

## 2024-05-17 RX ORDER — DEXMEDETOMIDINE HYDROCHLORIDE 100 UG/ML
INJECTION, SOLUTION INTRAVENOUS PRN
Status: DISCONTINUED | OUTPATIENT
Start: 2024-05-17 | End: 2024-05-17 | Stop reason: SDUPTHER

## 2024-05-17 RX ORDER — PROPOFOL 10 MG/ML
INJECTION, EMULSION INTRAVENOUS PRN
Status: DISCONTINUED | OUTPATIENT
Start: 2024-05-17 | End: 2024-05-17 | Stop reason: SDUPTHER

## 2024-05-17 RX ORDER — LIDOCAINE HYDROCHLORIDE 20 MG/ML
INJECTION, SOLUTION EPIDURAL; INFILTRATION; INTRACAUDAL; PERINEURAL PRN
Status: DISCONTINUED | OUTPATIENT
Start: 2024-05-17 | End: 2024-05-17 | Stop reason: SDUPTHER

## 2024-05-17 RX ORDER — EPHEDRINE SULFATE/0.9% NACL/PF 25 MG/5 ML
SYRINGE (ML) INTRAVENOUS PRN
Status: DISCONTINUED | OUTPATIENT
Start: 2024-05-17 | End: 2024-05-17 | Stop reason: SDUPTHER

## 2024-05-17 RX ORDER — FENTANYL CITRATE 50 UG/ML
50 INJECTION, SOLUTION INTRAMUSCULAR; INTRAVENOUS EVERY 5 MIN PRN
Status: DISCONTINUED | OUTPATIENT
Start: 2024-05-17 | End: 2024-05-17 | Stop reason: HOSPADM

## 2024-05-17 RX ORDER — FENTANYL CITRATE 50 UG/ML
INJECTION, SOLUTION INTRAMUSCULAR; INTRAVENOUS PRN
Status: DISCONTINUED | OUTPATIENT
Start: 2024-05-17 | End: 2024-05-17 | Stop reason: SDUPTHER

## 2024-05-17 RX ORDER — LIDOCAINE HYDROCHLORIDE 20 MG/ML
INJECTION, SOLUTION EPIDURAL; INFILTRATION; INTRACAUDAL; PERINEURAL PRN
Status: DISCONTINUED | OUTPATIENT
Start: 2024-05-17 | End: 2024-05-17 | Stop reason: ALTCHOICE

## 2024-05-17 RX ORDER — FAMOTIDINE 20 MG/1
20 TABLET, FILM COATED ORAL ONCE
Status: COMPLETED | OUTPATIENT
Start: 2024-05-17 | End: 2024-05-17

## 2024-05-17 RX ADMIN — PROPOFOL 30 MG: 10 INJECTION, EMULSION INTRAVENOUS at 11:56

## 2024-05-17 RX ADMIN — PROPOFOL 20 MG: 10 INJECTION, EMULSION INTRAVENOUS at 11:50

## 2024-05-17 RX ADMIN — DEXMEDETOMIDINE HYDROCHLORIDE 2 MCG: 100 INJECTION, SOLUTION INTRAVENOUS at 12:06

## 2024-05-17 RX ADMIN — MIDAZOLAM 2 MG: 1 INJECTION, SOLUTION INTRAMUSCULAR; INTRAVENOUS at 11:38

## 2024-05-17 RX ADMIN — PROPOFOL 100 MCG/KG/MIN: 10 INJECTION, EMULSION INTRAVENOUS at 11:53

## 2024-05-17 RX ADMIN — OXYCODONE HYDROCHLORIDE AND ACETAMINOPHEN 1 TABLET: 5; 325 TABLET ORAL at 21:29

## 2024-05-17 RX ADMIN — PROPOFOL 20 MG: 10 INJECTION, EMULSION INTRAVENOUS at 12:50

## 2024-05-17 RX ADMIN — DEXMEDETOMIDINE HYDROCHLORIDE 2 MCG: 100 INJECTION, SOLUTION INTRAVENOUS at 12:26

## 2024-05-17 RX ADMIN — FENTANYL CITRATE 25 MCG: 50 INJECTION INTRAMUSCULAR; INTRAVENOUS at 12:04

## 2024-05-17 RX ADMIN — PIPERACILLIN AND TAZOBACTAM 3375 MG: 3; .375 INJECTION, POWDER, FOR SOLUTION INTRAVENOUS at 08:10

## 2024-05-17 RX ADMIN — SODIUM CHLORIDE, PRESERVATIVE FREE 10 ML: 5 INJECTION INTRAVENOUS at 10:54

## 2024-05-17 RX ADMIN — SODIUM CHLORIDE: 9 INJECTION, SOLUTION INTRAVENOUS at 11:38

## 2024-05-17 RX ADMIN — VANCOMYCIN HYDROCHLORIDE 750 MG: 750 INJECTION, POWDER, LYOPHILIZED, FOR SOLUTION INTRAVENOUS at 11:36

## 2024-05-17 RX ADMIN — OXYCODONE HYDROCHLORIDE AND ACETAMINOPHEN 1 TABLET: 5; 325 TABLET ORAL at 08:10

## 2024-05-17 RX ADMIN — ATORVASTATIN CALCIUM 20 MG: 20 TABLET, FILM COATED ORAL at 21:24

## 2024-05-17 RX ADMIN — VANCOMYCIN HYDROCHLORIDE 750 MG: 750 INJECTION, POWDER, LYOPHILIZED, FOR SOLUTION INTRAVENOUS at 00:09

## 2024-05-17 RX ADMIN — RIVAROXABAN 2.5 MG: 2.5 TABLET, FILM COATED ORAL at 21:24

## 2024-05-17 RX ADMIN — EPHEDRINE SULFATE 5 MG: 5 INJECTION INTRAVENOUS at 12:12

## 2024-05-17 RX ADMIN — PIPERACILLIN AND TAZOBACTAM 3375 MG: 3; .375 INJECTION, POWDER, FOR SOLUTION INTRAVENOUS at 00:08

## 2024-05-17 RX ADMIN — DEXMEDETOMIDINE HYDROCHLORIDE 2 MCG: 100 INJECTION, SOLUTION INTRAVENOUS at 12:08

## 2024-05-17 RX ADMIN — EPHEDRINE SULFATE 5 MG: 5 INJECTION INTRAVENOUS at 12:23

## 2024-05-17 RX ADMIN — SODIUM CHLORIDE, PRESERVATIVE FREE 10 ML: 5 INJECTION INTRAVENOUS at 08:11

## 2024-05-17 RX ADMIN — FENTANYL CITRATE 25 MCG: 50 INJECTION INTRAMUSCULAR; INTRAVENOUS at 11:46

## 2024-05-17 RX ADMIN — PIPERACILLIN AND TAZOBACTAM 3375 MG: 3; .375 INJECTION, POWDER, FOR SOLUTION INTRAVENOUS at 17:10

## 2024-05-17 RX ADMIN — RIVAROXABAN 2.5 MG: 2.5 TABLET, FILM COATED ORAL at 08:11

## 2024-05-17 RX ADMIN — FAMOTIDINE 20 MG: 20 TABLET ORAL at 10:54

## 2024-05-17 RX ADMIN — DEXMEDETOMIDINE HYDROCHLORIDE 2 MCG: 100 INJECTION, SOLUTION INTRAVENOUS at 12:38

## 2024-05-17 RX ADMIN — OXYCODONE HYDROCHLORIDE AND ACETAMINOPHEN 1 TABLET: 5; 325 TABLET ORAL at 01:46

## 2024-05-17 RX ADMIN — PROPOFOL 20 MG: 10 INJECTION, EMULSION INTRAVENOUS at 12:00

## 2024-05-17 RX ADMIN — LIDOCAINE HYDROCHLORIDE 40 MG: 20 INJECTION, SOLUTION EPIDURAL; INFILTRATION; INTRACAUDAL; PERINEURAL at 11:50

## 2024-05-17 ASSESSMENT — PAIN DESCRIPTION - ONSET
ONSET: ON-GOING
ONSET: ON-GOING

## 2024-05-17 ASSESSMENT — PAIN SCALES - GENERAL
PAINLEVEL_OUTOF10: 0
PAINLEVEL_OUTOF10: 9
PAINLEVEL_OUTOF10: 0
PAINLEVEL_OUTOF10: 7
PAINLEVEL_OUTOF10: 4
PAINLEVEL_OUTOF10: 0
PAINLEVEL_OUTOF10: 10

## 2024-05-17 ASSESSMENT — PAIN DESCRIPTION - PAIN TYPE
TYPE: CHRONIC PAIN
TYPE: CHRONIC PAIN

## 2024-05-17 ASSESSMENT — PAIN DESCRIPTION - ORIENTATION
ORIENTATION: RIGHT

## 2024-05-17 ASSESSMENT — PAIN DESCRIPTION - DESCRIPTORS
DESCRIPTORS: ACHING
DESCRIPTORS: THROBBING;TENDER

## 2024-05-17 ASSESSMENT — PAIN - FUNCTIONAL ASSESSMENT
PAIN_FUNCTIONAL_ASSESSMENT: ACTIVITIES ARE NOT PREVENTED
PAIN_FUNCTIONAL_ASSESSMENT: ACTIVITIES ARE NOT PREVENTED
PAIN_FUNCTIONAL_ASSESSMENT: 0-10
PAIN_FUNCTIONAL_ASSESSMENT: PREVENTS OR INTERFERES SOME ACTIVE ACTIVITIES AND ADLS

## 2024-05-17 ASSESSMENT — PAIN DESCRIPTION - LOCATION
LOCATION: LEG
LOCATION: FOOT
LOCATION: LEG
LOCATION: FOOT

## 2024-05-17 ASSESSMENT — PAIN DESCRIPTION - FREQUENCY
FREQUENCY: CONTINUOUS
FREQUENCY: CONTINUOUS

## 2024-05-17 NOTE — ANESTHESIA POSTPROCEDURE EVALUATION
Department of Anesthesiology  Postprocedure Note    Patient: Hermes Narayan  MRN: [id number]  YOB: 1960  Date of evaluation: 5/17/2024    Procedure Summary       Date: 05/17/24 Room / Location: [facility] MAIN 04 / [facility] MAIN OR    Anesthesia Start: 1138 Anesthesia Stop: 1317    Procedure: AMPUTATION FIRST, SECOND, THIRD AND FOURTH TOES RIGHT FOOT (Toes) Diagnosis:       Gangrene of right foot (HCC)      Toe osteomyelitis, right (HCC)      (Gangrene of right foot (HCC) [I96])      (Toe osteomyelitis, right (HCC) [M86.9])    Surgeons: Teja Stewart DPM Responsible Provider: Ladonna Urias MD    Anesthesia Type: MAC ASA Status: 3            Anesthesia Type: MAC    Freda Phase I: Freda Score: 10    Freda Phase II:      Anesthesia Post Evaluation    Patient location during evaluation: PACU  Patient participation: complete - patient participated  Level of consciousness: awake and alert  Pain score: 0  Airway patency: patent  Nausea & Vomiting: no nausea and no vomiting  Cardiovascular status: hemodynamically stable  Respiratory status: acceptable  Hydration status: euvolemic  Multimodal analgesia pain management approach  Pain management: adequate    No notable events documented.

## 2024-05-17 NOTE — INTERVAL H&P NOTE
Update History & Physical    The patient's History and Physical of May 17, surgery was reviewed with the patient and I examined the patient. There was no change. The surgical site was confirmed by the patient and me.     Plan: The risks, benefits, expected outcome, and alternative to the recommended procedure have been discussed with the patient. Patient understands and wants to proceed with the procedure.     Electronically signed by Teja Stewart DPM on 5/17/2024 at 11:30 AM

## 2024-05-17 NOTE — OP NOTE
[address]  [address]                            OPERATIVE REPORT      PATIENT NAME: AMNA MOLINA               : 1960  MED REC NO: [id number]                       ROOM:   ACCOUNT NO: [id number]                       ADMIT DATE: 2024  PROVIDER: Teja Stewart DPM    DATE OF SERVICE:  2024    PREOPERATIVE DIAGNOSES:  Gangrene and infection of 1st, 2nd, 3rd, and 4th toes, right foot.    POSTOPERATIVE DIAGNOSES:       1. Gangrene and infection of 1st, 2nd, 3rd, and 4th toes, right foot.     2. 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    PROCEDURES PERFORMED:  Amputation of the great toe, 2nd, 3rd, and 4th toes with debridement of 1st metatarsal head.    SURGEON:  Teja Stewart DPM    ASSISTANT:  Lida.    ANESTHESIA:  MAC.    ESTIMATED BLOOD LOSS:  None.    SPECIMENS REMOVED:  Resected toes and bone.    INTRAOPERATIVE FINDINGS:  gangrene     COMPLICATIONS:  None.    IMPLANTS:  non    INDICATIONS:  Gangrene with infection all the toes.    DESCRIPTION OF PROCEDURE:  On 2024, the patient was placed on operating table in the supine position.  After adequate induction of MAC anesthesia, the right lower extremity was prepped and draped in usual sterile fashion.  Attention was directed to the great toe, 2nd, 3rd, and 4th toe.  There was necrosis on all the toes with a wet discharge from the base of the toes.  I inspected these areas and debrided some of the necrosis.  There was full thickness back to the base of all the toes including to the plantar and medial aspect of the great toe.  The wound edges appeared to be good perfusion and drainage was also noted.  No portions of the toes were salvageable.  Incisions extending from the medial aspect of the 1st metatarsal head around to the base of the 4th toe were accomplished and carefully sequentially using sharp blade on bone and joint

## 2024-05-17 NOTE — ANESTHESIA PRE PROCEDURE
Department of Anesthesiology  Preprocedure Note       Name:  Hermes Narayan   Age:  63 y.o.  :  1960                                          MRN:  [id number]         Date:  2024      Surgeon: Surgeon(s):  Teja Setwart DPM    Procedure: Procedure(s):  AMPUTATION FIRST, SECOND, THIRD AND FOURTH TOES RIGHT FOOT    Medications prior to admission:   Prior to Admission medications    Medication Sig Start Date End Date Taking? Authorizing Provider   traMADol (ULTRAM) 50 MG tablet take 1 tablet by mouth every 6 hours if needed 4/10/24  Yes ProviderAndree MD   XARELTO 2.5 MG TABS tablet Take 1 tablet by mouth 2 times daily 3/29/24  Yes ProviderAndree MD   acetaminophen (TYLENOL) 500 MG tablet Take 1,000 mg by mouth every 4 hours as needed for Pain.    Provider, MD Andree   lactobacillus (CULTURELLE) capsule Take 1 capsule by mouth daily (with breakfast) 23   Miguel Barahona MD   sennosides-docusate sodium (SENOKOT-S) 8.6-50 MG tablet Take 1 tablet by mouth daily as needed for Constipation 12/10/23   Miguel Barahona MD   atorvastatin (LIPITOR) 20 MG tablet Take 1 tablet by mouth daily 12/10/23   Miguel Barahona MD   aspirin 81 MG EC tablet Take 1 tablet by mouth daily 23   Edson Link MD       Current medications:    Current Facility-Administered Medications   Medication Dose Route Frequency Provider Last Rate Last Admin   • lidocaine PF 1 % injection 1 mL  1 mL IntraDERmal Once PRN Tootie Bautista APRN - CRNA       • famotidine (PEPCID) tablet 20 mg  20 mg Oral Once Tootie Bautista APRN - CRNA       • lactated ringers IV soln infusion   IntraVENous Continuous Tootie Bautista APRN - CRNA       • oxyCODONE-acetaminophen (PERCOCET) 5-325 MG per tablet 1 tablet  1 tablet Oral Q6H PRN Eliseo Brown MD   1 tablet at 24 0810   • vancomycin (VANCOCIN) 750 mg in sodium chloride 0.9 % 250 mL IVPB ([id number])  750 mg IntraVENous Q12H Kevin

## 2024-05-17 NOTE — PERIOP NOTE
TRANSFER - IN REPORT:    Verbal report received from Gregoria SHIN on Hermes Narayan  being received from [address] [place] [address] for ordered procedure      Report consisted of patient's Situation, Background, Assessment and   Recommendations(SBAR).     Information from the following report(s) Nurse Handoff Report was reviewed with the receiving nurse.    Opportunity for questions and clarification was provided.      Assessment completed upon patient's arrival to unit and care assumed.

## 2024-05-17 NOTE — PERIOP NOTE
TRANSFER - IN REPORT:    Verbal report received from KIP Curiel  on Hermes Narayan  being received from Room 506 for ordered procedure      Report consisted of patient's Situation, Background, Assessment and   Recommendations(SBAR).     Information from the following report(s) Nurse Handoff Report was reviewed with the receiving nurse.    Opportunity for questions and clarification was provided.      Assessment completed upon patient's arrival to unit and care assumed.

## 2024-05-17 NOTE — BRIEF OP NOTE
Brief Postoperative Note      Patient: Hermes Narayan  YOB: 1960  MRN: [id number]    Date of Procedure: 5/17/2024    Pre-Op Diagnosis Codes:     * Gangrene of right foot (HCC) [I96]     * Toe osteomyelitis, right (HCC) [M86.9]    Post-Op Diagnosis: Same       Procedure(s):  AMPUTATION FIRST, SECOND, THIRD AND FOURTH TOES RIGHT FOOT    Surgeon(s):  Teja Stewart DPM    Assistant:  * No surgical staff found *    Anesthesia: Monitor Anesthesia Care    Estimated Blood Loss (mL): Minimal    Complications: None    Specimens:   ID Type Source Tests Collected by Time Destination   1 : CLEAN MARGIN FIRST METATARSAL Tissue Tissue CULTURE, ANAEROBIC, CULTURE, TISSUE Teja Stewart DPM 5/17/2024 1221    A : AMPUTATION FIRST, SECOND, THIRD, AND FOURTH TOE RIGHT Tissue Tissue SURGICAL PATHOLOGY Teja Stewart DPM 5/17/2024 1219    B : CLEAN MARGIN FIRST METATARSAL Tissue Tissue SURGICAL PATHOLOGY Teja Stewart DPM 5/17/2024 1226        Implants:  * No implants in log *      Drains: * No LDAs found *    Findings:  Infection Present At Time Of Surgery (PATOS) (choose all levels that have infection present):  - Organ Space infection (below fascia) present as evidenced by osteomyelitis  Other Findings: gangrene,osteitis,gout    Electronically signed by Teja Stewart DPM on 5/17/2024 at 12:41 PM

## 2024-05-17 NOTE — SIGNIFICANT EVENT
Informed by nurse that patient is taking his home medications from a bag he brought with him. Recently dispensed medications include tramadol and percocet. When informed he wouldn't be receiving any more pain medications unless he gave us his bag of home medications, he became irate. I went to speak with him to explain why we don't allow patients to take their own home medications unless dispensed by our pharmacy. He was very displeased, but I explained he already took some of his home medications today, so we have to keep his safety a priority and that includes having control over what medications he takes while in the hospital. He accused me of \"working for the system\", but he did give me his medications. As I went to leave, he told me \"curse your bloodline\".

## 2024-05-18 LAB
ANION GAP SERPL CALC-SCNC: 4 MMOL/L (ref 3–18)
BASOPHILS # BLD: 0.1 K/UL (ref 0–0.1)
BASOPHILS NFR BLD: 1 % (ref 0–2)
BUN SERPL-MCNC: 20 MG/DL (ref 7–18)
BUN/CREAT SERPL: 16 (ref 12–20)
CALCIUM SERPL-MCNC: 9 MG/DL (ref 8.5–10.1)
CHLORIDE SERPL-SCNC: 108 MMOL/L (ref 100–111)
CO2 SERPL-SCNC: 25 MMOL/L (ref 21–32)
CREAT SERPL-MCNC: 1.27 MG/DL (ref 0.6–1.3)
DIFFERENTIAL METHOD BLD: ABNORMAL
EOSINOPHIL # BLD: 0.3 K/UL (ref 0–0.4)
EOSINOPHIL NFR BLD: 4 % (ref 0–5)
ERYTHROCYTE [DISTWIDTH] IN BLOOD BY AUTOMATED COUNT: 14.3 % (ref 11.6–14.5)
GLUCOSE SERPL-MCNC: 91 MG/DL (ref 74–99)
HCT VFR BLD AUTO: 32.7 % (ref 36–48)
HGB BLD-MCNC: 10.5 G/DL (ref 13–16)
IMM GRANULOCYTES # BLD AUTO: 0.1 K/UL (ref 0–0.04)
IMM GRANULOCYTES NFR BLD AUTO: 1 % (ref 0–0.5)
LYMPHOCYTES # BLD: 1.9 K/UL (ref 0.9–3.6)
LYMPHOCYTES NFR BLD: 23 % (ref 21–52)
MCH RBC QN AUTO: 29.7 PG (ref 24–34)
MCHC RBC AUTO-ENTMCNC: 32.1 G/DL (ref 31–37)
MCV RBC AUTO: 92.6 FL (ref 78–100)
MONOCYTES # BLD: 0.5 K/UL (ref 0.05–1.2)
MONOCYTES NFR BLD: 7 % (ref 3–10)
NEUTS SEG # BLD: 5.1 K/UL (ref 1.8–8)
NEUTS SEG NFR BLD: 64 % (ref 40–73)
NRBC # BLD: 0 K/UL (ref 0–0.01)
NRBC BLD-RTO: 0 PER 100 WBC
PLATELET # BLD AUTO: 453 K/UL (ref 135–420)
PMV BLD AUTO: 9.8 FL (ref 9.2–11.8)
POTASSIUM SERPL-SCNC: 4 MMOL/L (ref 3.5–5.5)
RBC # BLD AUTO: 3.53 M/UL (ref 4.35–5.65)
SODIUM SERPL-SCNC: 137 MMOL/L (ref 136–145)
WBC # BLD AUTO: 8 K/UL (ref 4.6–13.2)

## 2024-05-18 PROCEDURE — 36415 COLL VENOUS BLD VENIPUNCTURE: CPT

## 2024-05-18 PROCEDURE — 6370000000 HC RX 637 (ALT 250 FOR IP): Performed by: STUDENT IN AN ORGANIZED HEALTH CARE EDUCATION/TRAINING PROGRAM

## 2024-05-18 PROCEDURE — 6370000000 HC RX 637 (ALT 250 FOR IP): Performed by: PODIATRIST

## 2024-05-18 PROCEDURE — 6360000002 HC RX W HCPCS: Performed by: STUDENT IN AN ORGANIZED HEALTH CARE EDUCATION/TRAINING PROGRAM

## 2024-05-18 PROCEDURE — 99232 SBSQ HOSP IP/OBS MODERATE 35: CPT | Performed by: STUDENT IN AN ORGANIZED HEALTH CARE EDUCATION/TRAINING PROGRAM

## 2024-05-18 PROCEDURE — 80048 BASIC METABOLIC PNL TOTAL CA: CPT

## 2024-05-18 PROCEDURE — 6360000002 HC RX W HCPCS: Performed by: PODIATRIST

## 2024-05-18 PROCEDURE — 1100000000 HC RM PRIVATE

## 2024-05-18 PROCEDURE — 2580000003 HC RX 258: Performed by: PODIATRIST

## 2024-05-18 PROCEDURE — 85025 COMPLETE CBC W/AUTO DIFF WBC: CPT

## 2024-05-18 PROCEDURE — 6360000002 HC RX W HCPCS: Performed by: EMERGENCY MEDICINE

## 2024-05-18 RX ORDER — TRAMADOL HYDROCHLORIDE 50 MG/1
50 TABLET ORAL EVERY 6 HOURS PRN
Status: DISCONTINUED | OUTPATIENT
Start: 2024-05-18 | End: 2024-05-21 | Stop reason: HOSPADM

## 2024-05-18 RX ORDER — TRAMADOL HYDROCHLORIDE 50 MG/1
100 TABLET ORAL EVERY 6 HOURS PRN
Status: DISCONTINUED | OUTPATIENT
Start: 2024-05-18 | End: 2024-05-21 | Stop reason: HOSPADM

## 2024-05-18 RX ORDER — MORPHINE SULFATE 2 MG/ML
2 INJECTION, SOLUTION INTRAMUSCULAR; INTRAVENOUS ONCE
Status: COMPLETED | OUTPATIENT
Start: 2024-05-18 | End: 2024-05-18

## 2024-05-18 RX ORDER — NALOXONE HYDROCHLORIDE 0.4 MG/ML
0.4 INJECTION, SOLUTION INTRAMUSCULAR; INTRAVENOUS; SUBCUTANEOUS PRN
Status: DISCONTINUED | OUTPATIENT
Start: 2024-05-18 | End: 2024-05-21 | Stop reason: HOSPADM

## 2024-05-18 RX ADMIN — PIPERACILLIN AND TAZOBACTAM 3375 MG: 3; .375 INJECTION, POWDER, FOR SOLUTION INTRAVENOUS at 16:53

## 2024-05-18 RX ADMIN — TRAMADOL HYDROCHLORIDE 100 MG: 50 TABLET, COATED ORAL at 20:35

## 2024-05-18 RX ADMIN — SODIUM CHLORIDE, PRESERVATIVE FREE 10 ML: 5 INJECTION INTRAVENOUS at 09:12

## 2024-05-18 RX ADMIN — PIPERACILLIN AND TAZOBACTAM 3375 MG: 3; .375 INJECTION, POWDER, FOR SOLUTION INTRAVENOUS at 09:12

## 2024-05-18 RX ADMIN — PIPERACILLIN AND TAZOBACTAM 3375 MG: 3; .375 INJECTION, POWDER, FOR SOLUTION INTRAVENOUS at 00:38

## 2024-05-18 RX ADMIN — ATORVASTATIN CALCIUM 20 MG: 20 TABLET, FILM COATED ORAL at 20:36

## 2024-05-18 RX ADMIN — OXYCODONE HYDROCHLORIDE AND ACETAMINOPHEN 1 TABLET: 5; 325 TABLET ORAL at 09:05

## 2024-05-18 RX ADMIN — RIVAROXABAN 2.5 MG: 2.5 TABLET, FILM COATED ORAL at 20:36

## 2024-05-18 RX ADMIN — MORPHINE SULFATE 2 MG: 2 INJECTION, SOLUTION INTRAMUSCULAR; INTRAVENOUS at 00:50

## 2024-05-18 RX ADMIN — TRAMADOL HYDROCHLORIDE 100 MG: 50 TABLET, COATED ORAL at 13:44

## 2024-05-18 RX ADMIN — HYDROMORPHONE HYDROCHLORIDE 0.5 MG: 1 INJECTION, SOLUTION INTRAMUSCULAR; INTRAVENOUS; SUBCUTANEOUS at 23:41

## 2024-05-18 RX ADMIN — RIVAROXABAN 2.5 MG: 2.5 TABLET, FILM COATED ORAL at 09:07

## 2024-05-18 RX ADMIN — MORPHINE SULFATE 2 MG: 2 INJECTION, SOLUTION INTRAMUSCULAR; INTRAVENOUS at 04:45

## 2024-05-18 RX ADMIN — OXYCODONE HYDROCHLORIDE AND ACETAMINOPHEN 1 TABLET: 5; 325 TABLET ORAL at 03:12

## 2024-05-18 ASSESSMENT — PAIN DESCRIPTION - DESCRIPTORS
DESCRIPTORS: BURNING;ACHING
DESCRIPTORS: ACHING;JABBING
DESCRIPTORS: ACHING
DESCRIPTORS: ACHING;HEAVINESS
DESCRIPTORS: ACHING;BURNING
DESCRIPTORS: ACHING
DESCRIPTORS: ACHING;HEAVINESS
DESCRIPTORS: ACHING
DESCRIPTORS: ACHING;HEAVINESS

## 2024-05-18 ASSESSMENT — PAIN SCALES - GENERAL
PAINLEVEL_OUTOF10: 8
PAINLEVEL_OUTOF10: 9
PAINLEVEL_OUTOF10: 8
PAINLEVEL_OUTOF10: 3
PAINLEVEL_OUTOF10: 7
PAINLEVEL_OUTOF10: 4
PAINLEVEL_OUTOF10: 8
PAINLEVEL_OUTOF10: 8
PAINLEVEL_OUTOF10: 5
PAINLEVEL_OUTOF10: 10
PAINLEVEL_OUTOF10: 9
PAINLEVEL_OUTOF10: 4
PAINLEVEL_OUTOF10: 8
PAINLEVEL_OUTOF10: 7

## 2024-05-18 ASSESSMENT — PAIN - FUNCTIONAL ASSESSMENT

## 2024-05-18 ASSESSMENT — PAIN DESCRIPTION - FREQUENCY
FREQUENCY: CONTINUOUS

## 2024-05-18 ASSESSMENT — PAIN DESCRIPTION - PAIN TYPE
TYPE: SURGICAL PAIN
TYPE: CHRONIC PAIN;SURGICAL PAIN
TYPE: SURGICAL PAIN
TYPE: CHRONIC PAIN;SURGICAL PAIN
TYPE: SURGICAL PAIN

## 2024-05-18 ASSESSMENT — PAIN DESCRIPTION - ONSET
ONSET: ON-GOING

## 2024-05-18 ASSESSMENT — PAIN DESCRIPTION - LOCATION
LOCATION: FOOT
LOCATION: FOOT
LOCATION: LEG
LOCATION: LEG
LOCATION: FOOT
LOCATION: LEG
LOCATION: FOOT
LOCATION: FOOT
LOCATION: LEG
LOCATION: LEG
LOCATION: FOOT
LOCATION: LEG

## 2024-05-18 ASSESSMENT — PAIN DESCRIPTION - ORIENTATION
ORIENTATION: RIGHT

## 2024-05-18 NOTE — PLAN OF CARE
Problem: Discharge Planning  Goal: Discharge to home or other facility with appropriate resources  5/17/2024 2242 by Crystal Sky RN  Outcome: Progressing  5/17/2024 2242 by Crystal Sky RN  Outcome: Progressing  5/17/2024 2241 by Crystal Sky RN  Outcome: Progressing  Flowsheets (Taken 5/17/2024 1943)  Discharge to home or other facility with appropriate resources: Identify barriers to discharge with patient and caregiver     Problem: Pain  Goal: Verbalizes/displays adequate comfort level or baseline comfort level  5/17/2024 2242 by Crystal Sky RN  Outcome: Progressing  5/17/2024 2242 by Crystal Sky RN  Outcome: Progressing  5/17/2024 2241 by Crystal Sky RN  Outcome: Progressing     Problem: Safety - Adult  Goal: Free from fall injury  5/17/2024 2242 by Crystal Sky RN  Outcome: Progressing  5/17/2024 2242 by Crystal Sky RN  Outcome: Progressing  5/17/2024 2241 by Crystal Sky RN  Outcome: Progressing     Problem: Chronic Conditions and Co-morbidities  Goal: Patient's chronic conditions and co-morbidity symptoms are monitored and maintained or improved  5/17/2024 2242 by Crystal Sky RN  Outcome: Progressing  5/17/2024 2242 by Crystal Sky RN  Outcome: Progressing  5/17/2024 2241 by Crystal Sky RN  Outcome: Progressing  Flowsheets (Taken 5/17/2024 1943)  Care Plan - Patient's Chronic Conditions and Co-Morbidity Symptoms are Monitored and Maintained or Improved: Monitor and assess patient's chronic conditions and comorbid symptoms for stability, deterioration, or improvement     Problem: Skin/Tissue Integrity  Goal: Absence of new skin breakdown  Description: 1.  Monitor for areas of redness and/or skin breakdown  2.  Assess vascular access sites hourly  3.  Every 4-6 hours minimum:  Change oxygen saturation probe site  4.  Every 4-6 hours:  If on nasal continuous positive airway pressure, respiratory therapy assess nares and

## 2024-05-19 LAB
BACTERIA SPEC CULT: ABNORMAL
BASOPHILS # BLD: 0.1 K/UL (ref 0–0.1)
BASOPHILS NFR BLD: 1 % (ref 0–2)
DIFFERENTIAL METHOD BLD: ABNORMAL
EOSINOPHIL # BLD: 0.3 K/UL (ref 0–0.4)
EOSINOPHIL NFR BLD: 3 % (ref 0–5)
ERYTHROCYTE [DISTWIDTH] IN BLOOD BY AUTOMATED COUNT: 14 % (ref 11.6–14.5)
HCT VFR BLD AUTO: 30.6 % (ref 36–48)
HGB BLD-MCNC: 10 G/DL (ref 13–16)
IMM GRANULOCYTES # BLD AUTO: 0.1 K/UL (ref 0–0.04)
IMM GRANULOCYTES NFR BLD AUTO: 1 % (ref 0–0.5)
LYMPHOCYTES # BLD: 1.4 K/UL (ref 0.9–3.6)
LYMPHOCYTES NFR BLD: 14 % (ref 21–52)
MCH RBC QN AUTO: 29.9 PG (ref 24–34)
MCHC RBC AUTO-ENTMCNC: 32.7 G/DL (ref 31–37)
MCV RBC AUTO: 91.3 FL (ref 78–100)
MONOCYTES # BLD: 0.9 K/UL (ref 0.05–1.2)
MONOCYTES NFR BLD: 9 % (ref 3–10)
NEUTS SEG # BLD: 7.3 K/UL (ref 1.8–8)
NEUTS SEG NFR BLD: 73 % (ref 40–73)
NRBC # BLD: 0 K/UL (ref 0–0.01)
NRBC BLD-RTO: 0 PER 100 WBC
PLATELET # BLD AUTO: 385 K/UL (ref 135–420)
PMV BLD AUTO: 9.9 FL (ref 9.2–11.8)
RBC # BLD AUTO: 3.35 M/UL (ref 4.35–5.65)
SERVICE CMNT-IMP: ABNORMAL
WBC # BLD AUTO: 10.1 K/UL (ref 4.6–13.2)

## 2024-05-19 PROCEDURE — 6360000002 HC RX W HCPCS: Performed by: PODIATRIST

## 2024-05-19 PROCEDURE — 97164 PT RE-EVAL EST PLAN CARE: CPT

## 2024-05-19 PROCEDURE — 99232 SBSQ HOSP IP/OBS MODERATE 35: CPT | Performed by: STUDENT IN AN ORGANIZED HEALTH CARE EDUCATION/TRAINING PROGRAM

## 2024-05-19 PROCEDURE — 6370000000 HC RX 637 (ALT 250 FOR IP): Performed by: PODIATRIST

## 2024-05-19 PROCEDURE — 6360000002 HC RX W HCPCS: Performed by: STUDENT IN AN ORGANIZED HEALTH CARE EDUCATION/TRAINING PROGRAM

## 2024-05-19 PROCEDURE — 2580000003 HC RX 258: Performed by: PODIATRIST

## 2024-05-19 PROCEDURE — 1100000000 HC RM PRIVATE

## 2024-05-19 PROCEDURE — 36415 COLL VENOUS BLD VENIPUNCTURE: CPT

## 2024-05-19 PROCEDURE — 6370000000 HC RX 637 (ALT 250 FOR IP): Performed by: STUDENT IN AN ORGANIZED HEALTH CARE EDUCATION/TRAINING PROGRAM

## 2024-05-19 PROCEDURE — 85025 COMPLETE CBC W/AUTO DIFF WBC: CPT

## 2024-05-19 PROCEDURE — 94761 N-INVAS EAR/PLS OXIMETRY MLT: CPT

## 2024-05-19 RX ORDER — HYDROMORPHONE HYDROCHLORIDE 1 MG/ML
1 INJECTION, SOLUTION INTRAMUSCULAR; INTRAVENOUS; SUBCUTANEOUS ONCE
Status: COMPLETED | OUTPATIENT
Start: 2024-05-19 | End: 2024-05-19

## 2024-05-19 RX ADMIN — RIVAROXABAN 2.5 MG: 2.5 TABLET, FILM COATED ORAL at 08:09

## 2024-05-19 RX ADMIN — TRAMADOL HYDROCHLORIDE 100 MG: 50 TABLET, COATED ORAL at 14:56

## 2024-05-19 RX ADMIN — ATORVASTATIN CALCIUM 20 MG: 20 TABLET, FILM COATED ORAL at 20:54

## 2024-05-19 RX ADMIN — HYDROMORPHONE HYDROCHLORIDE 1 MG: 1 INJECTION, SOLUTION INTRAMUSCULAR; INTRAVENOUS; SUBCUTANEOUS at 13:13

## 2024-05-19 RX ADMIN — PIPERACILLIN AND TAZOBACTAM 3375 MG: 3; .375 INJECTION, POWDER, FOR SOLUTION INTRAVENOUS at 01:11

## 2024-05-19 RX ADMIN — PIPERACILLIN AND TAZOBACTAM 3375 MG: 3; .375 INJECTION, POWDER, FOR SOLUTION INTRAVENOUS at 08:16

## 2024-05-19 RX ADMIN — SODIUM CHLORIDE, PRESERVATIVE FREE 10 ML: 5 INJECTION INTRAVENOUS at 11:40

## 2024-05-19 RX ADMIN — TRAMADOL HYDROCHLORIDE 100 MG: 50 TABLET, COATED ORAL at 02:16

## 2024-05-19 RX ADMIN — HYDROMORPHONE HYDROCHLORIDE 0.5 MG: 1 INJECTION, SOLUTION INTRAMUSCULAR; INTRAVENOUS; SUBCUTANEOUS at 11:40

## 2024-05-19 RX ADMIN — PIPERACILLIN AND TAZOBACTAM 3375 MG: 3; .375 INJECTION, POWDER, FOR SOLUTION INTRAVENOUS at 17:16

## 2024-05-19 RX ADMIN — SODIUM CHLORIDE, PRESERVATIVE FREE 10 ML: 5 INJECTION INTRAVENOUS at 22:00

## 2024-05-19 RX ADMIN — TRAMADOL HYDROCHLORIDE 100 MG: 50 TABLET, COATED ORAL at 08:08

## 2024-05-19 RX ADMIN — RIVAROXABAN 2.5 MG: 2.5 TABLET, FILM COATED ORAL at 20:54

## 2024-05-19 RX ADMIN — TRAMADOL HYDROCHLORIDE 100 MG: 50 TABLET, COATED ORAL at 20:54

## 2024-05-19 ASSESSMENT — PAIN DESCRIPTION - LOCATION
LOCATION: FOOT
LOCATION: LEG
LOCATION: FOOT
LOCATION: LEG
LOCATION: FOOT

## 2024-05-19 ASSESSMENT — PAIN DESCRIPTION - FREQUENCY
FREQUENCY: CONTINUOUS
FREQUENCY: INTERMITTENT
FREQUENCY: CONTINUOUS
FREQUENCY: INTERMITTENT
FREQUENCY: CONTINUOUS
FREQUENCY: CONTINUOUS
FREQUENCY: INTERMITTENT
FREQUENCY: CONTINUOUS
FREQUENCY: CONTINUOUS
FREQUENCY: INTERMITTENT

## 2024-05-19 ASSESSMENT — PAIN SCALES - GENERAL
PAINLEVEL_OUTOF10: 7
PAINLEVEL_OUTOF10: 10
PAINLEVEL_OUTOF10: 4
PAINLEVEL_OUTOF10: 4
PAINLEVEL_OUTOF10: 7
PAINLEVEL_OUTOF10: 8
PAINLEVEL_OUTOF10: 10
PAINLEVEL_OUTOF10: 5
PAINLEVEL_OUTOF10: 10
PAINLEVEL_OUTOF10: 4
PAINLEVEL_OUTOF10: 4
PAINLEVEL_OUTOF10: 10
PAINLEVEL_OUTOF10: 10

## 2024-05-19 ASSESSMENT — PAIN DESCRIPTION - PAIN TYPE
TYPE: SURGICAL PAIN

## 2024-05-19 ASSESSMENT — PAIN DESCRIPTION - ORIENTATION
ORIENTATION: RIGHT

## 2024-05-19 ASSESSMENT — PAIN DESCRIPTION - ONSET
ONSET: GRADUAL
ONSET: ON-GOING
ONSET: GRADUAL
ONSET: ON-GOING
ONSET: GRADUAL
ONSET: ON-GOING
ONSET: ON-GOING
ONSET: GRADUAL
ONSET: GRADUAL
ONSET: ON-GOING
ONSET: ON-GOING

## 2024-05-19 ASSESSMENT — PAIN DESCRIPTION - DESCRIPTORS
DESCRIPTORS: ACHING;BURNING
DESCRIPTORS: ACHING
DESCRIPTORS: ACHING;BURNING
DESCRIPTORS: BURNING
DESCRIPTORS: ACHING
DESCRIPTORS: ACHING;BURNING
DESCRIPTORS: ACHING
DESCRIPTORS: ACHING;BURNING
DESCRIPTORS: ACHING

## 2024-05-19 ASSESSMENT — PAIN - FUNCTIONAL ASSESSMENT

## 2024-05-19 NOTE — PLAN OF CARE
Problem: Discharge Planning  Goal: Discharge to home or other facility with appropriate resources  5/18/2024 2255 by Crystal Sky RN  Outcome: Progressing  Flowsheets (Taken 5/18/2024 2044)  Discharge to home or other facility with appropriate resources: Identify barriers to discharge with patient and caregiver  5/18/2024 1147 by Glenna Menendez RN  Outcome: Progressing  Flowsheets (Taken 5/17/2024 1943 by Crystal Sky RN)  Discharge to home or other facility with appropriate resources: Identify barriers to discharge with patient and caregiver     Problem: Pain  Goal: Verbalizes/displays adequate comfort level or baseline comfort level  5/18/2024 2255 by Crystal Sky RN  Outcome: Progressing  5/18/2024 1147 by Glenna Menendez RN  Outcome: Progressing  Flowsheets (Taken 5/18/2024 1147)  Verbalizes/displays adequate comfort level or baseline comfort level:   Assess pain using appropriate pain scale   Encourage patient to monitor pain and request assistance     Problem: Safety - Adult  Goal: Free from fall injury  5/18/2024 2255 by Crystal Sky RN  Outcome: Progressing  5/18/2024 1147 by Glenna Menendez RN  Outcome: Progressing  Flowsheets (Taken 5/18/2024 1147)  Free From Fall Injury: Instruct family/caregiver on patient safety     Problem: Chronic Conditions and Co-morbidities  Goal: Patient's chronic conditions and co-morbidity symptoms are monitored and maintained or improved  5/18/2024 2255 by Crystal Sky RN  Outcome: Progressing  Flowsheets (Taken 5/18/2024 2044)  Care Plan - Patient's Chronic Conditions and Co-Morbidity Symptoms are Monitored and Maintained or Improved: Monitor and assess patient's chronic conditions and comorbid symptoms for stability, deterioration, or improvement  5/18/2024 1147 by Glenna Menendez RN  Outcome: Progressing  Flowsheets (Taken 5/17/2024 1943 by Crystal Sky RN)  Care Plan - Patient's Chronic Conditions and Co-Morbidity Symptoms

## 2024-05-20 LAB
BACTERIA SPEC CULT: NORMAL
BASOPHILS # BLD: 0 K/UL (ref 0–0.1)
BASOPHILS NFR BLD: 0 % (ref 0–2)
DIFFERENTIAL METHOD BLD: ABNORMAL
EOSINOPHIL # BLD: 0.3 K/UL (ref 0–0.4)
EOSINOPHIL NFR BLD: 2 % (ref 0–5)
ERYTHROCYTE [DISTWIDTH] IN BLOOD BY AUTOMATED COUNT: 13.9 % (ref 11.6–14.5)
GRAM STN SPEC: NORMAL
HCT VFR BLD AUTO: 29.8 % (ref 36–48)
HGB BLD-MCNC: 9.7 G/DL (ref 13–16)
IMM GRANULOCYTES # BLD AUTO: 0.1 K/UL (ref 0–0.04)
IMM GRANULOCYTES NFR BLD AUTO: 1 % (ref 0–0.5)
LYMPHOCYTES # BLD: 1.1 K/UL (ref 0.9–3.6)
LYMPHOCYTES NFR BLD: 11 % (ref 21–52)
MCH RBC QN AUTO: 29.5 PG (ref 24–34)
MCHC RBC AUTO-ENTMCNC: 32.6 G/DL (ref 31–37)
MCV RBC AUTO: 90.6 FL (ref 78–100)
MONOCYTES # BLD: 0.9 K/UL (ref 0.05–1.2)
MONOCYTES NFR BLD: 8 % (ref 3–10)
NEUTS SEG # BLD: 8.5 K/UL (ref 1.8–8)
NEUTS SEG NFR BLD: 78 % (ref 40–73)
NRBC # BLD: 0 K/UL (ref 0–0.01)
NRBC BLD-RTO: 0 PER 100 WBC
PLATELET # BLD AUTO: 340 K/UL (ref 135–420)
PMV BLD AUTO: 10.2 FL (ref 9.2–11.8)
RBC # BLD AUTO: 3.29 M/UL (ref 4.35–5.65)
SERVICE CMNT-IMP: NORMAL
WBC # BLD AUTO: 10.9 K/UL (ref 4.6–13.2)

## 2024-05-20 PROCEDURE — 6360000002 HC RX W HCPCS: Performed by: PODIATRIST

## 2024-05-20 PROCEDURE — 6370000000 HC RX 637 (ALT 250 FOR IP): Performed by: PODIATRIST

## 2024-05-20 PROCEDURE — 6360000002 HC RX W HCPCS: Performed by: STUDENT IN AN ORGANIZED HEALTH CARE EDUCATION/TRAINING PROGRAM

## 2024-05-20 PROCEDURE — 36415 COLL VENOUS BLD VENIPUNCTURE: CPT

## 2024-05-20 PROCEDURE — 99232 SBSQ HOSP IP/OBS MODERATE 35: CPT | Performed by: STUDENT IN AN ORGANIZED HEALTH CARE EDUCATION/TRAINING PROGRAM

## 2024-05-20 PROCEDURE — 1100000000 HC RM PRIVATE

## 2024-05-20 PROCEDURE — 2580000003 HC RX 258: Performed by: PODIATRIST

## 2024-05-20 PROCEDURE — 85025 COMPLETE CBC W/AUTO DIFF WBC: CPT

## 2024-05-20 PROCEDURE — 6370000000 HC RX 637 (ALT 250 FOR IP): Performed by: STUDENT IN AN ORGANIZED HEALTH CARE EDUCATION/TRAINING PROGRAM

## 2024-05-20 RX ADMIN — ATORVASTATIN CALCIUM 20 MG: 20 TABLET, FILM COATED ORAL at 21:16

## 2024-05-20 RX ADMIN — HYDROMORPHONE HYDROCHLORIDE 0.5 MG: 1 INJECTION, SOLUTION INTRAMUSCULAR; INTRAVENOUS; SUBCUTANEOUS at 00:00

## 2024-05-20 RX ADMIN — PIPERACILLIN AND TAZOBACTAM 3375 MG: 3; .375 INJECTION, POWDER, FOR SOLUTION INTRAVENOUS at 00:56

## 2024-05-20 RX ADMIN — TRAMADOL HYDROCHLORIDE 100 MG: 50 TABLET, COATED ORAL at 17:50

## 2024-05-20 RX ADMIN — TRAMADOL HYDROCHLORIDE 100 MG: 50 TABLET, COATED ORAL at 05:35

## 2024-05-20 RX ADMIN — TRAMADOL HYDROCHLORIDE 100 MG: 50 TABLET, COATED ORAL at 11:38

## 2024-05-20 RX ADMIN — PIPERACILLIN AND TAZOBACTAM 3375 MG: 3; .375 INJECTION, POWDER, FOR SOLUTION INTRAVENOUS at 22:49

## 2024-05-20 RX ADMIN — PIPERACILLIN AND TAZOBACTAM 3375 MG: 3; .375 INJECTION, POWDER, FOR SOLUTION INTRAVENOUS at 08:04

## 2024-05-20 RX ADMIN — RIVAROXABAN 2.5 MG: 2.5 TABLET, FILM COATED ORAL at 08:04

## 2024-05-20 RX ADMIN — RIVAROXABAN 2.5 MG: 2.5 TABLET, FILM COATED ORAL at 21:16

## 2024-05-20 RX ADMIN — SODIUM CHLORIDE, PRESERVATIVE FREE 10 ML: 5 INJECTION INTRAVENOUS at 08:04

## 2024-05-20 ASSESSMENT — PAIN - FUNCTIONAL ASSESSMENT
PAIN_FUNCTIONAL_ASSESSMENT: ACTIVITIES ARE NOT PREVENTED
PAIN_FUNCTIONAL_ASSESSMENT: PREVENTS OR INTERFERES SOME ACTIVE ACTIVITIES AND ADLS
PAIN_FUNCTIONAL_ASSESSMENT: ACTIVITIES ARE NOT PREVENTED
PAIN_FUNCTIONAL_ASSESSMENT: PREVENTS OR INTERFERES SOME ACTIVE ACTIVITIES AND ADLS
PAIN_FUNCTIONAL_ASSESSMENT: ACTIVITIES ARE NOT PREVENTED

## 2024-05-20 ASSESSMENT — PAIN DESCRIPTION - LOCATION
LOCATION: LEG
LOCATION: FOOT
LOCATION: LEG
LOCATION: FOOT
LOCATION: LEG

## 2024-05-20 ASSESSMENT — ENCOUNTER SYMPTOMS
VOMITING: 0
NAUSEA: 0
ABDOMINAL PAIN: 0
COLOR CHANGE: 0
SORE THROAT: 0
COUGH: 0
WHEEZING: 0
EYE PAIN: 0
DIARRHEA: 0
CONSTIPATION: 0
PHOTOPHOBIA: 0
HEMATOCHEZIA: 0
SHORTNESS OF BREATH: 0

## 2024-05-20 ASSESSMENT — PAIN DESCRIPTION - FREQUENCY
FREQUENCY: INTERMITTENT
FREQUENCY: CONTINUOUS
FREQUENCY: INTERMITTENT

## 2024-05-20 ASSESSMENT — PAIN DESCRIPTION - PAIN TYPE
TYPE: SURGICAL PAIN

## 2024-05-20 ASSESSMENT — PAIN DESCRIPTION - ORIENTATION
ORIENTATION: RIGHT

## 2024-05-20 ASSESSMENT — PAIN SCALES - GENERAL
PAINLEVEL_OUTOF10: 4
PAINLEVEL_OUTOF10: 3
PAINLEVEL_OUTOF10: 8
PAINLEVEL_OUTOF10: 3
PAINLEVEL_OUTOF10: 7
PAINLEVEL_OUTOF10: 4
PAINLEVEL_OUTOF10: 3
PAINLEVEL_OUTOF10: 10
PAINLEVEL_OUTOF10: 10
PAINLEVEL_OUTOF10: 5
PAINLEVEL_OUTOF10: 4

## 2024-05-20 ASSESSMENT — PAIN DESCRIPTION - DESCRIPTORS
DESCRIPTORS: SHOOTING
DESCRIPTORS: ACHING
DESCRIPTORS: ACHING;SHOOTING
DESCRIPTORS: ACHING;SHOOTING
DESCRIPTORS: SHOOTING
DESCRIPTORS: ACHING
DESCRIPTORS: ACHING;SHOOTING
DESCRIPTORS: SHOOTING
DESCRIPTORS: ACHING;SHOOTING

## 2024-05-20 ASSESSMENT — PAIN DESCRIPTION - ONSET
ONSET: GRADUAL
ONSET: ON-GOING
ONSET: GRADUAL
ONSET: ON-GOING
ONSET: GRADUAL

## 2024-05-20 NOTE — PLAN OF CARE
Problem: Discharge Planning  Goal: Discharge to home or other facility with appropriate resources  Outcome: Progressing  Flowsheets (Taken 5/19/2024 2059 by Crystal Sky, RN)  Discharge to home or other facility with appropriate resources: Identify barriers to discharge with patient and caregiver     Problem: Pain  Goal: Verbalizes/displays adequate comfort level or baseline comfort level  Outcome: Progressing     Problem: Safety - Adult  Goal: Free from fall injury  Outcome: Progressing     Problem: Chronic Conditions and Co-morbidities  Goal: Patient's chronic conditions and co-morbidity symptoms are monitored and maintained or improved  Outcome: Progressing  Flowsheets (Taken 5/19/2024 2059 by Crystal Sky RN)  Care Plan - Patient's Chronic Conditions and Co-Morbidity Symptoms are Monitored and Maintained or Improved: Monitor and assess patient's chronic conditions and comorbid symptoms for stability, deterioration, or improvement     Problem: Skin/Tissue Integrity  Goal: Absence of new skin breakdown  Description: 1.  Monitor for areas of redness and/or skin breakdown  2.  Assess vascular access sites hourly  3.  Every 4-6 hours minimum:  Change oxygen saturation probe site  4.  Every 4-6 hours:  If on nasal continuous positive airway pressure, respiratory therapy assess nares and determine need for appliance change or resting period.  Outcome: Progressing

## 2024-05-21 VITALS
BODY MASS INDEX: 30.52 KG/M2 | RESPIRATION RATE: 16 BRPM | HEIGHT: 71 IN | OXYGEN SATURATION: 97 % | SYSTOLIC BLOOD PRESSURE: 137 MMHG | TEMPERATURE: 99.4 F | DIASTOLIC BLOOD PRESSURE: 75 MMHG | WEIGHT: 218 LBS | HEART RATE: 76 BPM

## 2024-05-21 LAB
ALBUMIN SERPL-MCNC: 2.7 G/DL (ref 3.4–5)
ALBUMIN/GLOB SERPL: 0.7 (ref 0.8–1.7)
ALP SERPL-CCNC: 53 U/L (ref 45–117)
ALT SERPL-CCNC: 19 U/L (ref 16–61)
ANION GAP SERPL CALC-SCNC: 6 MMOL/L (ref 3–18)
AST SERPL-CCNC: 15 U/L (ref 10–38)
BACTERIA SPEC CULT: NORMAL
BILIRUB SERPL-MCNC: 0.7 MG/DL (ref 0.2–1)
BUN SERPL-MCNC: 17 MG/DL (ref 7–18)
BUN/CREAT SERPL: 17 (ref 12–20)
CALCIUM SERPL-MCNC: 8.5 MG/DL (ref 8.5–10.1)
CHLORIDE SERPL-SCNC: 105 MMOL/L (ref 100–111)
CO2 SERPL-SCNC: 22 MMOL/L (ref 21–32)
CREAT SERPL-MCNC: 1.03 MG/DL (ref 0.6–1.3)
GLOBULIN SER CALC-MCNC: 4 G/DL (ref 2–4)
GLUCOSE SERPL-MCNC: 97 MG/DL (ref 74–99)
POTASSIUM SERPL-SCNC: 3.6 MMOL/L (ref 3.5–5.5)
PROT SERPL-MCNC: 6.7 G/DL (ref 6.4–8.2)
SERVICE CMNT-IMP: NORMAL
SODIUM SERPL-SCNC: 133 MMOL/L (ref 136–145)

## 2024-05-21 PROCEDURE — 6370000000 HC RX 637 (ALT 250 FOR IP): Performed by: PODIATRIST

## 2024-05-21 PROCEDURE — 36415 COLL VENOUS BLD VENIPUNCTURE: CPT

## 2024-05-21 PROCEDURE — 2580000003 HC RX 258: Performed by: PODIATRIST

## 2024-05-21 PROCEDURE — 6360000002 HC RX W HCPCS: Performed by: STUDENT IN AN ORGANIZED HEALTH CARE EDUCATION/TRAINING PROGRAM

## 2024-05-21 PROCEDURE — 6360000002 HC RX W HCPCS: Performed by: PODIATRIST

## 2024-05-21 PROCEDURE — 99239 HOSP IP/OBS DSCHRG MGMT >30: CPT | Performed by: STUDENT IN AN ORGANIZED HEALTH CARE EDUCATION/TRAINING PROGRAM

## 2024-05-21 PROCEDURE — 94761 N-INVAS EAR/PLS OXIMETRY MLT: CPT

## 2024-05-21 PROCEDURE — 6370000000 HC RX 637 (ALT 250 FOR IP): Performed by: STUDENT IN AN ORGANIZED HEALTH CARE EDUCATION/TRAINING PROGRAM

## 2024-05-21 PROCEDURE — 80053 COMPREHEN METABOLIC PANEL: CPT

## 2024-05-21 RX ORDER — AMOXICILLIN AND CLAVULANATE POTASSIUM 875; 125 MG/1; MG/1
1 TABLET, FILM COATED ORAL 2 TIMES DAILY
Qty: 20 TABLET | Refills: 0 | Status: SHIPPED | OUTPATIENT
Start: 2024-05-21 | End: 2024-05-31

## 2024-05-21 RX ORDER — ACETAMINOPHEN 500 MG
1000 TABLET ORAL EVERY 6 HOURS PRN
Qty: 120 TABLET | Refills: 3 | Status: SHIPPED | OUTPATIENT
Start: 2024-05-21

## 2024-05-21 RX ORDER — TRAMADOL HYDROCHLORIDE 50 MG/1
50 TABLET ORAL EVERY 6 HOURS PRN
Qty: 20 TABLET | Refills: 0 | Status: SHIPPED | OUTPATIENT
Start: 2024-05-21 | End: 2024-05-28

## 2024-05-21 RX ORDER — LEVOFLOXACIN 500 MG/1
500 TABLET, FILM COATED ORAL DAILY
Qty: 10 TABLET | Refills: 0 | Status: SHIPPED | OUTPATIENT
Start: 2024-05-21 | End: 2024-05-31

## 2024-05-21 RX ADMIN — RIVAROXABAN 2.5 MG: 2.5 TABLET, FILM COATED ORAL at 08:59

## 2024-05-21 RX ADMIN — HYDROMORPHONE HYDROCHLORIDE 0.5 MG: 1 INJECTION, SOLUTION INTRAMUSCULAR; INTRAVENOUS; SUBCUTANEOUS at 00:03

## 2024-05-21 RX ADMIN — TRAMADOL HYDROCHLORIDE 50 MG: 50 TABLET, COATED ORAL at 04:05

## 2024-05-21 RX ADMIN — SODIUM CHLORIDE, PRESERVATIVE FREE 10 ML: 5 INJECTION INTRAVENOUS at 09:03

## 2024-05-21 RX ADMIN — HYDROMORPHONE HYDROCHLORIDE 0.5 MG: 1 INJECTION, SOLUTION INTRAMUSCULAR; INTRAVENOUS; SUBCUTANEOUS at 08:59

## 2024-05-21 RX ADMIN — PIPERACILLIN AND TAZOBACTAM 3375 MG: 3; .375 INJECTION, POWDER, FOR SOLUTION INTRAVENOUS at 05:40

## 2024-05-21 RX ADMIN — TRAMADOL HYDROCHLORIDE 100 MG: 50 TABLET, COATED ORAL at 10:06

## 2024-05-21 ASSESSMENT — PAIN DESCRIPTION - LOCATION
LOCATION: FOOT

## 2024-05-21 ASSESSMENT — PAIN DESCRIPTION - ORIENTATION
ORIENTATION: RIGHT

## 2024-05-21 ASSESSMENT — PAIN - FUNCTIONAL ASSESSMENT
PAIN_FUNCTIONAL_ASSESSMENT: ACTIVITIES ARE NOT PREVENTED

## 2024-05-21 ASSESSMENT — PAIN DESCRIPTION - DESCRIPTORS
DESCRIPTORS: ACHING
DESCRIPTORS: DISCOMFORT;ACHING
DESCRIPTORS: ACHING;DISCOMFORT
DESCRIPTORS: ACHING

## 2024-05-21 ASSESSMENT — PAIN DESCRIPTION - ONSET: ONSET: ON-GOING

## 2024-05-21 ASSESSMENT — PAIN SCALES - GENERAL
PAINLEVEL_OUTOF10: 10
PAINLEVEL_OUTOF10: 3
PAINLEVEL_OUTOF10: 3
PAINLEVEL_OUTOF10: 10
PAINLEVEL_OUTOF10: 7
PAINLEVEL_OUTOF10: 10
PAINLEVEL_OUTOF10: 9
PAINLEVEL_OUTOF10: 3

## 2024-05-21 ASSESSMENT — PAIN DESCRIPTION - PAIN TYPE
TYPE: SURGICAL PAIN
TYPE: SURGICAL PAIN

## 2024-05-21 ASSESSMENT — PAIN DESCRIPTION - FREQUENCY
FREQUENCY: INTERMITTENT
FREQUENCY: INTERMITTENT

## 2024-05-21 NOTE — DISCHARGE SUMMARY
Discharge Summary    Patient: Hermes Narayan MRN: [id number]  CSN: [id number]    YOB: 1960  Age: 63 y.o.  Sex: male    DOA: 5/15/2024 LOS:  LOS: 6 days   Discharge Date: 5/21/2024      Admission Diagnosis: Gangrene (HCC) [I96]  Septicemia (HCC) [A41.9]  Peripheral arterial disease (HCC) [I73.9]    Discharge Diagnosis: Gangrene  Peripheral arterial disease  Cellulitis  MAXIMO    Discharge Condition: Stable    Discharge Disposition: Home    PHYSICAL EXAM    Visit Vitals  /75   Pulse 76   Temp 99.4 °F (37.4 °C) (Oral)   Resp 16   Ht 1.803 m (5' 11\")   Wt 98.9 kg (218 lb)   SpO2 97%   BMI 30.40 kg/m²       General: Alert, cooperative, no acute distress, mild to moderate pain on foot  HEENT: NC, Atraumatic.  PERRLA, EOMI. Anicteric sclerae.  Lungs:  CTA Bilaterally. No Wheezing/Rhonchi/Rales.  Heart:  Regular  rhythm,  No murmur, No Rubs, No Gallops  Abdomen: Soft, Non distended, Non tender.  +Bowel sounds, no HSM  Extremities: No c/c/e, foot bandaged with minimal serosanguineous discharge noted, otherwise CDI  Psych:   Good insight. Not anxious or agitated.  Neurologic:  CN 2-12 grossly intact, oriented X 4.  No acute neurological deficits,     Hospital Course By Problem:   Patient presented to the emergency room due to worsening right foot infection.  Due to peripheral arterial disease, patient had gangrene since January that the podiatrist was allowing to demarcate so that he did not over amputate.  Patient however began developing associated pain redness edema and as such came to the emergency room.  In the emergency room patient was worked up in usual manner found to have gangrene of the right foot along with mild MAXIMO.  MAXIMO was monitored and treated with gentle hydration.  Gangrene was treated with IV antibiotics with podiatry and infectious disease consulted.  Patient was taken to the OR and had amputation of affected digits.  Surgical cultures were taken.  Antibiotics were

## 2024-05-21 NOTE — CARE COORDINATION
05/21/24 1311   IMM Letter   IMM Letter given to Patient/Family/Significant other/Guardian/POA/by: Naz Santiago   IMM Letter date given: 05/21/24   IMM Letter time given: 1200     SW dicussed the IMM letter with the patient. He acknowledge understanding, Pt received a copy, the original was placed in the chart.       SINCERE Quinonez

## 2024-05-21 NOTE — CARE COORDINATION
Discharge order noted for today. Orders received. No needs identified at this time. Case management remains available as needed.     Pt. Has transportation arranged.       SINCERE Quinonez

## 2024-05-21 NOTE — PLAN OF CARE
Problem: Discharge Planning  Goal: Discharge to home or other facility with appropriate resources  Outcome: Progressing  Flowsheets (Taken 5/19/2024 2059 by Crystal Sky RN)  Discharge to home or other facility with appropriate resources: Identify barriers to discharge with patient and caregiver     Problem: Pain  Goal: Verbalizes/displays adequate comfort level or baseline comfort level  Outcome: Progressing  Flowsheets (Taken 5/19/2024 1044 by Glenna Menendez RN)  Verbalizes/displays adequate comfort level or baseline comfort level: Encourage patient to monitor pain and request assistance     Problem: Safety - Adult  Goal: Free from fall injury  Outcome: Progressing  Flowsheets (Taken 5/19/2024 1044 by Glenna Menendez RN)  Free From Fall Injury: Based on caregiver fall risk screen, instruct family/caregiver to ask for assistance with transferring infant if caregiver noted to have fall risk factors     Problem: Chronic Conditions and Co-morbidities  Goal: Patient's chronic conditions and co-morbidity symptoms are monitored and maintained or improved  Outcome: Progressing  Flowsheets (Taken 5/19/2024 2059 by Crystal Sky RN)  Care Plan - Patient's Chronic Conditions and Co-Morbidity Symptoms are Monitored and Maintained or Improved: Monitor and assess patient's chronic conditions and comorbid symptoms for stability, deterioration, or improvement     Problem: Skin/Tissue Integrity  Goal: Absence of new skin breakdown  Description: 1.  Monitor for areas of redness and/or skin breakdown  2.  Assess vascular access sites hourly  3.  Every 4-6 hours minimum:  Change oxygen saturation probe site  4.  Every 4-6 hours:  If on nasal continuous positive airway pressure, respiratory therapy assess nares and determine need for appliance change or resting period.  Outcome: Progressing

## 2024-05-21 NOTE — PROGRESS NOTES
1940 Bedside and Verbal shift change  Received from KIP Samaniego (outgoing nurse), to MITCH Hamilton (incoming)  Pt. Is AOX 4. IV intermittent infusion, Pt. In  pain at this time (Not due for pain meds). Report included the following information SBAR, Procedure Summary, Intake/Output, MAR, Recent Results, Med Rec Status, and Cardiac Rhythm @ SR.  Will Resume care and monitor Pt. Condition.     Pt. Head to Toe Assessment Done and documented.  Pt. Educated on call bell when in need of help and assistance.  Pt. verbalized   understanding.  Bed alarm on.    Pt verbalizing that food is not of his preference, offered Pt. Food before midnight for NPO.  Pt. Verbalized understanding.    2115  Pt offered ginger ale and Pt. Verbalized relief from stomach upset.               Pt. Given tv dinner.    Pt educated on NPO status @[place].  Pt verbalized understanding.    2130  RN starting to educate Pt. Of home meds to be given, Mag, Pt. Stated that he has already take his meds because he was not given today.  RN asked where he gets his meds and he showed RN his meds in a ziplock.  RN educate Pt that home meds are normally kept in pharmacy and we return home meds prior pt discharge.  Pt. Refused to give bag of home meds.  Notified Dr Aldrich  and YARED ROBLERO of Pt home meds.,  [facility] Care supervisor.    RN explained further that Pt. Family when come he could send home meds home. Pt. Not sure if family members are coming.    2215 ANNIKA Bustamante in pt room to assess Pt and talk with Pt.    2230  Pt gave his home meds in ziplock, brought to pharmacy and will be ready to  when Pt. Is ready to be dc'd.             Copy of home meds list in chart and 1 copy given to Pt.    2300  Pt ate 50% of the food served.    0000  NPO.    0146  Pt given percocet per MAR for pain management.    0230  Pt resting in bed comfortably.     0400  Pt made no complaints.    0600  pt able to rest and sleep in between care.          
2108 Transfer in report received from Nydia Duran RN in the ED.     2130 Patient arrived to unit via stretcher alert and oriented. Patient acclimated to room. Personal belonging and call bell within reach. Left foot assessed. First four toes are necrotic on the right foot. Pulses are palpable on the right foot. IV fluids verified. Patient updated on plan of care    0200 Patient complaining of pain. Pain is continuous to the right foot. Pain is a 5/10. This writer requesting more pain medication for breakthrough pain.     0240 Eliseo Brown MD aware of the above. Ordered percocet 5-325 mg PO every 6 hours Prn. Tramadol 50 mg discontinued   
Advance Care Planning   Healthcare Decision Maker:    Primary Decision Maker: Elba Narayan - Spouse - [phone]    Secondary Decision Maker: Abigail Narayan - Parent - [phone]      Today we documented Decision Maker(s) consistent with ACP documents on file.        conducted an initial consultation and Spiritual Assessment for Hermes Narayan, who is a 63 y.o.,male. Patient's Primary Language is: English.   According to the patient's EMR [employer] Affiliation is: Worship.     The reason the Patient came to the hospital is:   Patient Active Problem List    Diagnosis Date Noted    Cellulitis of right lower extremity 05/16/2024    Wet gangrene (HCC) 05/15/2024    Foot infection 12/05/2023    Primary hypertension 12/05/2023    Peripheral arterial disease (HCC) 11/22/2023    Borderline diabetes mellitus 11/20/2023    Gangrene of left foot (HCC) 11/19/2023    Sepsis (HCC) 05/05/2021        The  provided the following Interventions:  Initiated a relationship of care and support.   Listened empathically.  Provided information about Spiritual Care Services.  Chart reviewed.    The following outcomes where achieved:  Patient expressed gratitude for 's visit.    Assessment:  Patient does not have any Scientology/cultural needs that will affect patient's preferences in health care.  There are no spiritual or Scientology issues which require intervention at this time.     Plan:  Chaplains will continue to follow and will provide pastoral care on an as needed/requested basis.   recommends bedside caregivers page  on duty if patient shows signs of acute spiritual or emotional distress.    Chaplain Sharon Doyle  Spiritual Care   [phone]   
Culute from [facility]'s positive for gm positive cocci  Will add Vancomycin 
[facility]  Progress Note    Patient: Hermes Narayan Age: 63 y.o. : 1960 MR#: [id number] SSN: [id number]  Date/Time: 2024    Subjective:   Subjective:  Symptoms:  Stable.  No shortness of breath, cough, chest pain, weakness, headache, chest pressure, diarrhea or anxiety.    Diet:  Adequate intake.  No nausea or vomiting.    Activity level: Normal.    Pain:  He reports no pain.       No acute events, no changes in vital signs.  Patient had increased pain after having cramping redone by podiatrist.    Review of Systems   Constitutional: Negative for chills, fever and malaise/fatigue.   HENT:  Negative for sore throat.    Eyes:  Negative for pain, photophobia and visual disturbance.   Cardiovascular:  Negative for chest pain, irregular heartbeat, palpitations and syncope.   Respiratory:  Negative for cough, shortness of breath and wheezing.    Skin:  Negative for color change, dry skin and rash.   Gastrointestinal:  Negative for abdominal pain, constipation, diarrhea, hematochezia, melena, nausea and vomiting.   Neurological:  Negative for disturbances in coordination, dizziness, focal weakness, headaches and weakness.   Psychiatric/Behavioral:  Negative for altered mental status, depression, hallucinations and suicidal ideas.       Assessment/Plan:   Right foot gangrene  PAD  MAXIOM  Thrombocytosis    Plan  Pain is better controlled however patient did have increased pain with the change in bandages.  Otherwise continue plan as prior.    Disposition: Expected location: To be determined    Expected timeframe: one or more days before discharge     Case discussed with:  [x]Patient  []Family  [x]Nursing  [x]Case Management  DVT Prophylaxis:  []Lovenox  []Hep SQ  []SCDs  []Coumadin   []On Heparin gtt   [x] DOAC    Objective:   Objective:  General Appearance:  Well-appearing, in no acute distress, in pain and uncomfortable.    Vital signs: (most recent): Blood pressure 
[facility] Hospitalist Group  Progress Note    Patient: Hermes Narayan Age: 63 y.o. : 1960 MR#: [id number] SSN: [id number]  Date/Time: 2024    Subjective:   Subjective:  Symptoms:  Stable.  No shortness of breath, cough, chest pain, weakness, headache, chest pressure, diarrhea or anxiety.    Diet:  Adequate intake.  No nausea or vomiting.    Activity level: Normal.    Pain:  He reports no pain.       No acute events, no changes in vital signs.     Review of Systems   Constitutional: Negative for chills, fever and malaise/fatigue.   HENT:  Negative for sore throat.    Eyes:  Negative for pain, photophobia and visual disturbance.   Cardiovascular:  Negative for chest pain, irregular heartbeat, palpitations and syncope.   Respiratory:  Negative for cough, shortness of breath and wheezing.    Skin:  Negative for color change, dry skin and rash.   Gastrointestinal:  Negative for abdominal pain, constipation, diarrhea, hematochezia, melena, nausea and vomiting.   Neurological:  Negative for disturbances in coordination, dizziness, focal weakness, headaches and weakness.   Psychiatric/Behavioral:  Negative for altered mental status, depression, hallucinations and suicidal ideas.       Assessment/Plan:   Right foot gangrene  PAD  MAXIMO  Thrombocytosis    Plan  Pain is better controlled however patient did have increased pain with the change in bandages.  Pain is better controlled today though not entirely controlled.  Pending intraoperative cultures    Disposition: Expected location: To be determined    Expected timeframe: one or more days before discharge     Case discussed with:  [x]Patient  []Family  [x]Nursing  [x]Case Management  DVT Prophylaxis:  []Lovenox  []Hep SQ  []SCDs  []Coumadin   []On Heparin gtt   [x] DOAC    Objective:   Objective:  General Appearance:  Well-appearing, in no acute distress, in pain and uncomfortable.    Vital signs: (most recent): Blood pressure 124/70, 
[facility]   Pharmacy Pharmacokinetic Monitoring Service - Vancomycin     Hermes Narayan is a 63 y.o. male starting on vancomycin therapy for Bone and Joint Infection. Pharmacy was consulted for monitoring and adjustment.    Target Concentration: Goal AUC/MICHAEL 400-600 mg*hr/L    Additional Antimicrobials: Piperacillin/Tazobactam    Pertinent Laboratory Values:   Temp: 98.2 °F (36.8 °C), Weight - Scale: 90.7 kg (200 lb)  Recent Labs     05/15/24  1616   CREATININE 1.50*   BUN 26*   WBC 12.9   PROCAL <0.05     Estimated Creatinine Clearance: 59 mL/min (A) (based on SCr of 1.5 mg/dL (H)).    Pertinent Cultures:  Culture Date Source Results   05/15 Wound IP   05/15 Blood x 2 IP   MRSA Nasal Swab: N/A. Non-respiratory infection    Plan:  Dosing recommendations based on Bayesian software  Start vancomycin 2g x 1, then 750 mg q12h  Anticipated AUC of 518 and trough concentration of 16.9 at steady state  Renal labs as indicated   Vancomycin concentration ordered for AM labs tomorrow  Pharmacy will continue to monitor patient and adjust therapy as indicated    Thank you for the consult,  LEE REY RP  5/15/2024    
[facility] Hospitalist Group  Progress Note    Patient: Hermes Narayan Age: 63 y.o. : 1960 MR#: [id number] SSN: [id number]  Date/Time: 2024    Subjective:   Subjective:  Symptoms:  Stable.  No shortness of breath, cough, chest pain, weakness, headache, chest pressure, diarrhea or anxiety.    Diet:  Adequate intake.  No nausea or vomiting.    Activity level: Normal.    Pain:  He reports no pain.       No acute events, no changes in vital signs or plan at this time    Review of Systems   Constitutional: Negative for chills, fever and malaise/fatigue.   HENT:  Negative for sore throat.    Eyes:  Negative for pain, photophobia and visual disturbance.   Cardiovascular:  Negative for chest pain, irregular heartbeat, palpitations and syncope.   Respiratory:  Negative for cough, shortness of breath and wheezing.    Skin:  Negative for color change, dry skin and rash.   Gastrointestinal:  Negative for abdominal pain, constipation, diarrhea, hematochezia, melena, nausea and vomiting.   Neurological:  Negative for disturbances in coordination, dizziness, focal weakness, headaches and weakness.   Psychiatric/Behavioral:  Negative for altered mental status, depression, hallucinations and suicidal ideas.       Assessment/Plan:   Right foot gangrene  PAD  MAXIMO  Thrombocytosis    Plan  Podiatry and infectious disease consulted and have seen.  Plan likely for some amount of amputation of toes pending podiatry  Pending vascular study results      Disposition: Expected location: To be determined    Expected timeframe: Two or more days before discharge     Case discussed with:  [x]Patient  []Family  [x]Nursing  [x]Case Management  DVT Prophylaxis:  []Lovenox  []Hep SQ  []SCDs  []Coumadin   []On Heparin gtt   [x] DOAC    Objective:   Objective:  General Appearance:  Comfortable, well-appearing, in no acute distress and not in pain.    Vital signs: (most recent): Blood pressure 118/71, pulse 59, 
[facility] Hospitalist Group  Progress Note    Patient: Hermes Narayan Age: 63 y.o. : 1960 MR#: [id number] SSN: [id number]  Date/Time: 2024    Subjective:   Subjective:  Symptoms:  Stable.  No shortness of breath, cough, chest pain, weakness, headache, chest pressure, diarrhea or anxiety.    Diet:  Adequate intake.  No nausea or vomiting.    Activity level: Normal.    Pain:  He reports no pain.       No acute events, no changes in vital signs or plan at this time  Patient with fairly significant foot pain and seemingly quite tired having not had good sleep.    Review of Systems   Constitutional: Negative for chills, fever and malaise/fatigue.   HENT:  Negative for sore throat.    Eyes:  Negative for pain, photophobia and visual disturbance.   Cardiovascular:  Negative for chest pain, irregular heartbeat, palpitations and syncope.   Respiratory:  Negative for cough, shortness of breath and wheezing.    Skin:  Negative for color change, dry skin and rash.   Gastrointestinal:  Negative for abdominal pain, constipation, diarrhea, hematochezia, melena, nausea and vomiting.   Neurological:  Negative for disturbances in coordination, dizziness, focal weakness, headaches and weakness.   Psychiatric/Behavioral:  Negative for altered mental status, depression, hallucinations and suicidal ideas.       Assessment/Plan:   Right foot gangrene  PAD  MAXIMO  Thrombocytosis    Plan  Patient with significant pain after local anesthetic wore off, states Percocet is not sufficient and morphine did not seem to touch it.  Will change patient's pain medication, he states that on previous surgical interventions tramadol has helped significantly.  Will add this as first-line and then add Dilaudid as breakthrough pain medication.  Otherwise overall doing well, small spot of blood on bandages but otherwise not particularly soiled or disturbed.    Disposition: Expected location: To be determined    Expected 
Infectious Disease progress Note        Reason: Right foot infection    Current abx Prior abx   Piperacillin/tazobactam since 5/15   vancomycin      Lines:       Assessment :    63-year-old man with past medical history significant for obesity, upper extremity cellulitis 6 years ago, arthritis admitted to [facility] on 12/5/2023 for surgical intervention left foot.     Hospitalization at [facility] May 2021 for acute gangrenous cellulitis of both legs- infection superimposed on underlying vasculitis.   Wound culture 5/7- pseudomonas putida, enterococcus, group B streptococcus, Staphylococcus intermedius-susceptibilities reviewed   Status post skin biopsy of lower extremity ulcer on 5/7/2021- afb stain negative. Fungal cultures, histology suggestive of neutrophilic vasculitis.   Negative fungal/AFB stain, cultures 5/7/2021     Hospitalization at [facility] 11/2023 for left second toe distal tip gangrene with cellulitis status post left second toe partial amputation at proximal interphalangeal joint.  IntraOp cultures 11/20-Morganella, methicillin susceptible Staphylococcus aureus, coagulase-negative Staphylococcus       Hospitalization [facility] December 2023 for left second toe amputation stump cellulitis, gangrene  Wound cx 12/5- morganella, staph aureus (negative for MRSA antigen detection)  S/p left second toe amputation.   Intra op cx 12/6-gram-positive cocci, gram-positive rods    Clinical presentation consistent with wet gangrene right foot, right foot cellulitis  Status post amputation right first second third and fourth toe on 5/17/2024.  Intraoperative findings noted.      Wound culture 5/15-heavy mixed braulio including gram-negative's, Bacteroides      Recurrent infection likely secondary to microvascular disease    D/w pathologist. Biopsy of clean margins negative for osteomyelitis     Single positive blood culture for multiple colony types of Staphylococcus species on 5/15-likely contaminant     Peripheral arterial disease: 
PHYSICAL THERAPY EVALUATION/DISCHARGE    Patient: Hermes Narayan (63 y.o. male)  Date: 5/16/2024  Primary Diagnosis: Gangrene (HCC) [I96]  Septicemia (HCC) [A41.9]  Peripheral arterial disease (HCC) [I73.9]  Procedure(s) (LRB):  AMPUTATION FIRST, SECOND, THIRD AND FOURTH TOES RIGHT FOOT (N/A)     Precautions: Fall Risk,  ,  ,  ,  ,  ,  ,    PLOF: Independent.      ASSESSMENT AND RECOMMENDATIONS:  Patient resting in bed upon entry and agreeable to PT evaluation. He has necrotic right toes. Patient performs all functional transfers and mobility independently. When ambulating, he remains NWB to right forefoot. Patient does not require further skilled physical therapy intervention at this level of care.    Further Equipment Recommendations for Discharge: none    AMPAC:    24    At this time and based on an AM-PAC score, no further PT is recommended upon discharge due to (i.e. patient at baseline functional status).  Recommend patient returns to prior setting with prior services.    This AMPAC score should be considered in conjunction with interdisciplinary team recommendations to determine the most appropriate discharge setting. Patient's social support, diagnosis, medical stability, and prior level of function should also be taken into consideration.     SUBJECTIVE:   Patient stated “I hope they only have to take my toes.”    OBJECTIVE DATA SUMMARY:     Past Medical History:   Diagnosis Date    Arthritis     Cellulitis 04/13/2021    legs    Hypercholesterolemia     Hypertension      Past Surgical History:   Procedure Laterality Date    INVASIVE VASCULAR N/A 11/22/2023    Angiography lower ext left performed by Gaetano Alex MD at [facility] CARDIAC CATH LAB    INVASIVE VASCULAR N/A 11/22/2023    Pta femoral popliteal artery performed by Gaetano Alex MD at [facility] CARDIAC CATH LAB    INVASIVE VASCULAR N/A 11/22/2023    Aortagram abdominal performed by Gaetano Alex MD at [facility] CARDIAC CATH LAB    ORTHOPEDIC 
Patient received all due medication, vital signs was checked and documented. Pain was controlled, no fresh complaint.  
Patient was duely cared, still in pain at intervals. Vital signs was checked and documented.patient is fair.   
Patient was in a lot of pain during the shift, got one time dose of morphine twice and percocet, Other nursing care was rendered  
Physical Therapy    PHYSICAL THERAPY RE-EVALUATION/DISCHARGE    Patient: Hermes Narayan (63 y.o. male)  Date: 5/19/2024  Primary Diagnosis: Gangrene (HCC) [I96]  Septicemia (HCC) [A41.9]  Peripheral arterial disease (HCC) [I73.9]  Procedure(s) (LRB):  AMPUTATION FIRST, SECOND, THIRD AND FOURTH TOES RIGHT FOOT (N/A) 2 Days Post-Op   Precautions: Fall Risk, Weight Bearing, Right Lower Extremity Weight Bearing: Partial Weight Bearing      ASSESSMENT AND RECOMMENDATIONS:  Pt seen POD 2 s/p amputation R 1-4 toes, educated on heel weight baring, verbalized understanding and able to adhere with functional mobility this date, dons post op shoe with no difficulty. Pt able to maintain with no AD within room, has RW and cane at home for use as needed. Pt remains with no mobility deficits warranting further acute PT. Will sign off. Left with all needs met and call bell within reach.     Patient does not require further skilled physical therapy intervention at this level of care.    Further Equipment Recommendations for Discharge: n/a has all DME     [facility]: AM-PAC Inpatient Mobility Raw Score : 24      At this time and based on an AM-PAC score, no further PT is recommended upon discharge due to (i.e. patient at baseline functional status…etc…).  Recommend patient returns to prior setting with prior services.    This [facility] score should be considered in conjunction with interdisciplinary team recommendations to determine the most appropriate discharge setting. Patient's social support, diagnosis, medical stability, and prior level of function should also be taken into consideration.     SUBJECTIVE:   Patient stated “I think this will make my pain worse but I'll do it.”    OBJECTIVE DATA SUMMARY:     Past Medical History:   Diagnosis Date    Arthritis     Cellulitis 04/13/2021    legs    Hypercholesterolemia     Hypertension      Past Surgical History:   Procedure Laterality Date    INVASIVE VASCULAR N/A 11/22/2023    Angiography 
Pt read and understood discharge instructions, IV discontinued and wheeled down to exit.  
RN called stating that patient continues to have uncontrolled pain.  RN states that patient is awake and alert and not somnolent.  Patient had surgery on the lower extremity last evening.  Likely anesthesia is wearing off.  Continue as needed Percocet.  Will add one-time dose of morphine again.  As needed Narcan has been ordered.  
Seen at bedside awake and alert.  Dressing clean and intact.  Inspected wound,well approximated.  Drain removed.  No signs infection.  Stable.  
Seen at bedside awake and alert.  Stable wound.  Cleared for discharge.  Reviewed and agree with treatment plan.  Follow up in  one to two weeks.  Home health wound nurse.  
and not in pain.    Vital signs: (most recent): Blood pressure 130/71, pulse 73, temperature 97.9 °F (36.6 °C), temperature source Oral, resp. rate 16, height 1.803 m (5' 11\"), weight 96 kg (211 lb 11.2 oz), SpO2 98 %.  Vital signs are normal.  No fever.    HEENT: Normal HEENT exam.    Lungs:  Normal effort and normal respiratory rate.  Breath sounds clear to auscultation.    Heart: Normal rate.  Regular rhythm.  S1 normal and S2 normal.  No murmur, gallop or friction rub.   Chest: Symmetric chest wall expansion.   Abdomen: Abdomen is soft and non-distended.  Bowel sounds are normal.   There is no abdominal tenderness.     Extremities: Normal range of motion.  (Right lower extremity with malodorous clearly gangrenous toes.)  Pulses: Distal pulses are intact.  There are decreased pulses.    Neurological: Patient is alert and oriented to person, place and time.  Normal strength.    Pupils:  Pupils are equal, round, and reactive to light.    Skin:  Warm and dry.         Labs:    Recent Results (from the past 24 hour(s))   Vascular duplex lower extremity arteries bilateral with BERLIN    Collection Time: 05/16/24 11:16 AM   Result Value Ref Range    Right CFA dist PSV 89.1 cm/s    Right CFA mid .2 cm/s    Right SFA dist PSV 82.7 cm/s    Right SFA mid PSV 92.4 cm/s    Right SFA prox .7 cm/s    Right CFA prox .8 cm/s    Right Pop A dist PSV 97.2 cm/s    Right Pop A mid PSV 85.9 cm/s    Right PTA dist PSV 76.3 cm/s    Right PTA mid PSV 98.9 cm/s    Right MICHAEL prox PSV 76.2 cm/s    Left CFA dist PSV 90.8 cm/s    Left CFA mid PSV 81.1 cm/s    Left SFA dist PSV 96.9 cm/s    Left SFA mid .7 cm/s    Left SFA prox PSV 84.3 cm/s    Left PFA prox .7 cm/s    Left Pop A dist .8 cm/s    Left Pop A mid .0 cm/s    Left PTA dist PSV 63.7 cm/s    Left PTA mid PSV 70.1 cm/s    Left peroneal mid PSV 54.1 cm/s    Body Surface Area 2.19 m2    Right SFA prox mulugeta ratio 1.0     Right SFA mid mulugeta 
Independent  Stand to Sit: Independent    ADL Assessment:   Feeding: Independent  Grooming: Independent  UE Bathing: Independent  LE Bathing: Independent  UE Dressing: Independent  LE Dressing: Independent  Toileting: Independent    Pain:  Intensity Pre-treatment: 0/10   Intensity Post-treatment: 0/10  Scale: Numeric Rating Scale      Activity Tolerance:   Activity Tolerance: Patient Tolerated treatment well  Please refer to the flowsheet for vital signs taken during this treatment.    After treatment:   [] Patient left in no apparent distress sitting up in chair  [x] Patient left in no apparent distress in bed  [x] Call bell left within reach  [x] Nursing notified  [] Caregiver present  [] Bed alarm activated    COMMUNICATION/EDUCATION:   Patient Education  Education Given To: Patient  Education Provided: Role of Therapy;Plan of Care  Education Method: Demonstration;Verbal;Teach Back  Barriers to Learning: None  Education Outcome: Verbalized understanding;Demonstrated understanding    Thank you for this referral.  Lencho Cunningham OTR/L  Minutes: 10    Eval Complexity: Decision Making: Low Complexity  
RRR,  no cyanosis or peripheral edema of extremities  GI: soft, non-tender, normal bowel sounds  Neuro: moves all extremities, no focal deficits, normal speech  Psych: appropriate mood and affect, no visual or auditory hallucinations  Skin: right foot surgical dressing not removed    Labs: Results:   Chemistry Recent Labs     05/18/24 0223   GLUCOSE 91      K 4.0      CO2 25   BUN 20*   CREATININE 1.27        CBC w/Diff Recent Labs     05/18/24 0223 05/19/24 0229 05/20/24  0442   WBC 8.0 10.1 10.9   RBC 3.53* 3.35* 3.29*   HGB 10.5* 10.0* 9.7*   HCT 32.7* 30.6* 29.8*   * 385 340        Microbiology Results       Procedure Component Value Units Date/Time    Culture, Anaerobic [[id number]]  (Abnormal) Collected: 05/17/24 1233    Order Status: Completed Specimen: Tissue Updated: 05/19/24 1419     Special Requests RT FOOT GANGRENE     Culture       SCANT BACTEROIDES THETAIOTAOMICRON BETA LACTAMASE POSITIVE          Culture, Tissue [[id number]]  (Abnormal) Collected: 05/17/24 1233    Order Status: Completed Specimen: Tissue Updated: 05/19/24 1129     Special Requests RT FOOT GANGRENE     Gram Stain 1+ WBCS SEEN               1+ GRAM POSITIVE COCCI IN PAIRS            1+ GRAM POSITIVE RODS         FEW Gram negative rods        Culture       MODERATE Gram negative rods IDENTIFICATION AND SUSCEPTIBILITY TO FOLLOW                  LIGHT Mixed skin braulio including gram negative rods          Culture, Blood 2 [[id number]] Collected: 05/15/24 1626    Order Status: Completed Specimen: Blood Updated: 05/20/24 0718     Special Requests NO SPECIAL REQUESTS        Culture NO GROWTH 5 DAYS       Culture, Blood 1 [[id number]]  (Abnormal) Collected: 05/15/24 1616    Order Status: Completed Specimen: Blood Updated: 05/17/24 1438     Special Requests NO SPECIAL REQUESTS        Gram Stain       AEROBIC BOTTLE Gram positive cocci IN GROUPS                  SMEAR CALLED TO AND CORRECTLY REPEATED BY: KIP MELTON 
moist mucus membranes  Respiratory: CTA with no signs of respiratory distress  Cardiovascular: RRR,  no cyanosis or peripheral edema of extremities  GI: soft, non-tender, normal bowel sounds  Neuro: moves all extremities, no focal deficits, normal speech  Psych: appropriate mood and affect, no visual or auditory hallucinations  Skin: right foot surgical dressing not removed    Labs: Results:   Chemistry Recent Labs     05/15/24  1616 05/16/24  0428 05/17/24  0332   GLUCOSE 105* 101* 99    138 136   K 4.1 4.0 4.1    107 106   CO2 29 26 23   BUN 26* 34* 19*   CREATININE 1.50* 1.45* 1.00        CBC w/Diff Recent Labs     05/15/24  1616 05/16/24  0428 05/17/24  0332   WBC 12.9 10.4 10.9   RBC 3.85* 3.45* 3.46*   HGB 11.4* 10.5* 10.3*   HCT 34.8* 31.7* 31.8*   * 462* 459*        Microbiology Results       Procedure Component Value Units Date/Time    Culture, Anaerobic [[id number]] Collected: 05/17/24 1233    Order Status: Sent Specimen: Tissue Updated: 05/17/24 1244    Culture, Tissue [[id number]] Collected: 05/17/24 1233    Order Status: Sent Specimen: Tissue Updated: 05/17/24 1245    Culture, Blood 2 [[id number]] Collected: 05/15/24 1626    Order Status: Completed Specimen: Blood Updated: 05/17/24 0642     Special Requests NO SPECIAL REQUESTS        Culture NO GROWTH 2 DAYS       Culture, Blood 1 [[id number]]  (Abnormal) Collected: 05/15/24 1616    Order Status: Completed Specimen: Blood Updated: 05/17/24 1438     Special Requests NO SPECIAL REQUESTS        Gram Stain       AEROBIC BOTTLE Gram positive cocci IN GROUPS                  SMEAR CALLED TO AND CORRECTLY REPEATED BY: KIP MELTON 5SOUTH ON [id number] AT 1426 HRS TO 1396.           Culture       STAPHYLOCOCCUS SPECIES, COAGULASE NEGATIVE Multiple Alpha Types GROWING IN 1 OF 2 BOTTLES DRAWN NO SITE          Culture, Wound [[id number]]  (Abnormal) Collected: 05/15/24 1616    Order Status: Completed Specimen: Toe Updated: 05/17/24 1339

## (undated) DEVICE — ELECTRODE PT RET AD L9FT HI MOIST COND ADH HYDRGEL CORDED

## (undated) DEVICE — SUTURE VICRYL + SZ 3-0 L27IN ABSRB UD L26MM SH 1/2 CIR VCP416H

## (undated) DEVICE — CURITY NON-ADHERENT STRIPS: Brand: CURITY

## (undated) DEVICE — STERILE POLYISOPRENE POWDER-FREE SURGICAL GLOVES: Brand: PROTEXIS

## (undated) DEVICE — APPLICATOR MEDICATED 26 CC SOLUTION HI LT ORNG CHLORAPREP

## (undated) DEVICE — DRAPE,UNDERBUTTOCKS,PCH,STERILE: Brand: MEDLINE

## (undated) DEVICE — KIT OR TURNOVER

## (undated) DEVICE — NEEDLE 25GA X 1.5 IN ECLIPSE

## (undated) DEVICE — SOLUTION IV 1000ML 0.9% SOD CHL

## (undated) DEVICE — HOOK LOCK LATEX FREE ELASTIC BANDAGE 4INX5YD

## (undated) DEVICE — INTENDED FOR TISSUE SEPARATION, AND OTHER PROCEDURES THAT REQUIRE A SHARP SURGICAL BLADE TO PUNCTURE OR CUT.: Brand: BARD-PARKER SAFETY BLADES SIZE 10, STERILE

## (undated) DEVICE — SUTURE VICRYL COAT  PC 5 PRECIS COSM CONVENTIONAL CUT PRIM 3 8 J823H

## (undated) DEVICE — FLEX ADVANTAGE 3000CC: Brand: FLEX ADVANTAGE

## (undated) DEVICE — TRAY PREP DRY W/ PREM GLV 2 APPL 6 SPNG 2 UNDPD 1 OVERWRAP

## (undated) DEVICE — SUTURE VCRL SZ 2-0 L27IN ABSRB UD L26MM SH 1/2 CIR J417H

## (undated) DEVICE — SUTURE VCRL SZ 3-0 L27IN ABSRB UD L26MM SH 1/2 CIR J416H

## (undated) DEVICE — THREE-QUARTER SHEET: Brand: CONVERTORS

## (undated) DEVICE — PACK PROCEDURE SURG MAJ W/ BASIN LF

## (undated) DEVICE — 3 ML SYRINGE WITH HYPODERMIC SAFETY NEEDLE: Brand: MAGELLAN

## (undated) DEVICE — REM POLYHESIVE ADULT PATIENT RETURN ELECTRODE: Brand: VALLEYLAB

## (undated) DEVICE — BANDAGE COMPR W4INXL5YD WHT BGE POLY COT M E WRP WV HK AND

## (undated) DEVICE — DRAPE,EXTREMITY,89X128,STERILE: Brand: MEDLINE

## (undated) DEVICE — BLADE SAW W9XL25MM THK0.38MM CUT THK0.43MM REPL SAG OSC THN

## (undated) DEVICE — BANDAGE COBAN 4 IN COMPR W4INXL5YD FOAM COHESIVE QUIK STK SELF ADH SFT

## (undated) DEVICE — SOLUTION IRRIG 1000ML 0.9% SOD CHL USP POUR PLAS BTL

## (undated) DEVICE — SUTURE PROL SZ 2-0 L30IN NONABSORBABLE BLU L26MM SH 1/2 CIR 8833H

## (undated) DEVICE — GAUZE,SPONGE,4"X4",16PLY,STRL,LF,10/TRAY: Brand: MEDLINE

## (undated) DEVICE — SWAB CULT DBL W/O CHAR RAYON TIP AMIES GEL CLMN FOR COLL

## (undated) DEVICE — 450 ML BOTTLE OF 0.05% CHLORHEXIDINE GLUCONATE IN 99.95% STERILE WATER FOR IRRIGATION, USP AND APPLICATOR.: Brand: IRRISEPT ANTIMICROBIAL WOUND LAVAGE

## (undated) DEVICE — DRAPE TWL SURG 16X26IN BLU ORB04] ALLCARE INC]

## (undated) DEVICE — PADDING CAST W4INXL4YD NONSTERILE COT COHESIVE HND TEARABLE

## (undated) DEVICE — SUTURE MCRYL SZ 4-0 L27IN ABSRB UD L24MM PS-1 3/8 CIR PRIM Y935H

## (undated) DEVICE — SUTURE VICRYL SZ 2-0 L27IN ABSRB UD L26MM SH 1/2 CIR J417H

## (undated) DEVICE — CULTURETTE SGL EVAC TUBE PALL -- 100/CA

## (undated) DEVICE — CASTING PLSTR DYNACAST 4X15FT

## (undated) DEVICE — ATTACHMENT SMK EVAC FOR ES PNCL ACCUVAC

## (undated) DEVICE — KIT CLN UP BON SECOURS MARYV

## (undated) DEVICE — SHEET, DRAPE, SPLIT, STERILE: Brand: MEDLINE

## (undated) DEVICE — BANDAGE,GAUZE,BULKEE LITE,3"X4.1YD,STRL: Brand: MEDLINE

## (undated) DEVICE — PREP SKN CHLRAPRP APPL 10.5ML --